# Patient Record
Sex: FEMALE | Race: WHITE | NOT HISPANIC OR LATINO | Employment: OTHER | URBAN - NONMETROPOLITAN AREA
[De-identification: names, ages, dates, MRNs, and addresses within clinical notes are randomized per-mention and may not be internally consistent; named-entity substitution may affect disease eponyms.]

---

## 2018-01-03 ENCOUNTER — OFFICE VISIT CONVERTED (OUTPATIENT)
Dept: FAMILY MEDICINE CLINIC | Age: 78
End: 2018-01-03
Attending: NURSE PRACTITIONER

## 2018-03-15 ENCOUNTER — OFFICE VISIT CONVERTED (OUTPATIENT)
Dept: FAMILY MEDICINE CLINIC | Age: 78
End: 2018-03-15
Attending: FAMILY MEDICINE

## 2018-06-04 ENCOUNTER — OFFICE VISIT CONVERTED (OUTPATIENT)
Dept: FAMILY MEDICINE CLINIC | Age: 78
End: 2018-06-04
Attending: NURSE PRACTITIONER

## 2018-06-22 ENCOUNTER — OFFICE VISIT CONVERTED (OUTPATIENT)
Dept: FAMILY MEDICINE CLINIC | Age: 78
End: 2018-06-22
Attending: FAMILY MEDICINE

## 2018-09-07 ENCOUNTER — OFFICE VISIT CONVERTED (OUTPATIENT)
Dept: FAMILY MEDICINE CLINIC | Age: 78
End: 2018-09-07
Attending: FAMILY MEDICINE

## 2018-11-05 ENCOUNTER — OFFICE VISIT CONVERTED (OUTPATIENT)
Dept: FAMILY MEDICINE CLINIC | Age: 78
End: 2018-11-05
Attending: NURSE PRACTITIONER

## 2018-12-10 ENCOUNTER — OFFICE VISIT CONVERTED (OUTPATIENT)
Dept: FAMILY MEDICINE CLINIC | Age: 78
End: 2018-12-10
Attending: FAMILY MEDICINE

## 2019-03-26 ENCOUNTER — HOSPITAL ENCOUNTER (OUTPATIENT)
Dept: OTHER | Facility: HOSPITAL | Age: 79
Discharge: HOME | End: 2019-03-26
Attending: FAMILY MEDICINE

## 2019-03-26 ENCOUNTER — OFFICE VISIT CONVERTED (OUTPATIENT)
Dept: FAMILY MEDICINE CLINIC | Age: 79
End: 2019-03-26
Attending: FAMILY MEDICINE

## 2019-03-26 LAB
ALBUMIN SERPL-MCNC: 3 G/DL (ref 3.5–5)
ALBUMIN/GLOB SERPL: 0.7 {RATIO} (ref 1.4–2.6)
ALP SERPL-CCNC: 98 U/L (ref 43–160)
ALT SERPL-CCNC: 14 U/L (ref 10–40)
ANION GAP SERPL CALC-SCNC: 17 MMOL/L (ref 8–19)
AST SERPL-CCNC: 18 U/L (ref 15–50)
BASOPHILS # BLD MANUAL: 0.03 10*3/UL (ref 0–0.2)
BASOPHILS NFR BLD MANUAL: 0.2 % (ref 0–3)
BILIRUB SERPL-MCNC: 0.41 MG/DL (ref 0.2–1.3)
BNP SERPL-MCNC: 1992 PG/ML (ref 0–1800)
BUN SERPL-MCNC: 13 MG/DL (ref 5–25)
BUN/CREAT SERPL: 14 {RATIO} (ref 6–20)
CALCIUM SERPL-MCNC: 9.4 MG/DL (ref 8.7–10.4)
CHLORIDE SERPL-SCNC: 94 MMOL/L (ref 99–111)
CHOLEST SERPL-MCNC: 147 MG/DL (ref 107–200)
CHOLEST/HDLC SERPL: 4.1 {RATIO} (ref 3–6)
CONV CO2: 29 MMOL/L (ref 22–32)
CONV TOTAL PROTEIN: 7.6 G/DL (ref 6.3–8.2)
CREAT UR-MCNC: 0.93 MG/DL (ref 0.5–0.9)
DEPRECATED RDW RBC AUTO: 46.8 FL
EOSINOPHIL # BLD MANUAL: 0.83 10*3/UL (ref 0–0.7)
EOSINOPHIL NFR BLD MANUAL: 5.8 % (ref 0–7)
ERYTHROCYTE [DISTWIDTH] IN BLOOD BY AUTOMATED COUNT: 14 % (ref 11.5–14.5)
GFR SERPLBLD BASED ON 1.73 SQ M-ARVRAT: 58 ML/MIN/{1.73_M2}
GLOBULIN UR ELPH-MCNC: 4.6 G/DL (ref 2–3.5)
GLUCOSE SERPL-MCNC: 104 MG/DL (ref 65–99)
GRANS (ABSOLUTE): 10.52 10*3/UL (ref 2–8)
GRANS: 73.6 % (ref 30–85)
HBA1C MFR BLD: 15.2 G/DL (ref 12–16)
HCT VFR BLD AUTO: 48 % (ref 37–47)
HDLC SERPL-MCNC: 36 MG/DL (ref 40–60)
IMM GRANULOCYTES # BLD: 0.12 10*3/UL (ref 0–0.54)
IMM GRANULOCYTES NFR BLD: 0.8 % (ref 0–0.43)
LDLC SERPL CALC-MCNC: 90 MG/DL (ref 70–100)
LYMPHOCYTES # BLD MANUAL: 1.85 10*3/UL (ref 1–5)
LYMPHOCYTES NFR BLD MANUAL: 6.7 % (ref 3–10)
MCH RBC QN AUTO: 29 PG (ref 27–31)
MCHC RBC AUTO-ENTMCNC: 31.7 G/DL (ref 33–37)
MCV RBC AUTO: 91.4 FL (ref 81–99)
MONOCYTES # BLD AUTO: 0.96 10*3/UL (ref 0.2–1.2)
OSMOLALITY SERPL CALC.SUM OF ELEC: 280 MOSM/KG (ref 273–304)
PLATELET # BLD AUTO: 388 10*3/UL (ref 130–400)
PMV BLD AUTO: 8.8 FL (ref 7.4–10.4)
POTASSIUM SERPL-SCNC: 4.9 MMOL/L (ref 3.5–5.3)
RBC # BLD AUTO: 5.25 10*6/UL (ref 4.2–5.4)
SODIUM SERPL-SCNC: 135 MMOL/L (ref 135–147)
TRIGL SERPL-MCNC: 106 MG/DL (ref 40–150)
VARIANT LYMPHS NFR BLD MANUAL: 12.9 % (ref 20–45)
VLDLC SERPL-MCNC: 21 MG/DL (ref 5–37)
WBC # BLD AUTO: 14.31 10*3/UL (ref 4.8–10.8)

## 2019-03-27 LAB — 25(OH)D3 SERPL-MCNC: 58 NG/ML (ref 30–100)

## 2019-04-12 ENCOUNTER — HOSPITAL ENCOUNTER (OUTPATIENT)
Dept: OTHER | Facility: HOSPITAL | Age: 79
Discharge: HOME | End: 2019-04-12
Attending: FAMILY MEDICINE

## 2019-04-30 ENCOUNTER — OFFICE VISIT CONVERTED (OUTPATIENT)
Dept: FAMILY MEDICINE CLINIC | Age: 79
End: 2019-04-30
Attending: FAMILY MEDICINE

## 2019-05-20 ENCOUNTER — OFFICE VISIT CONVERTED (OUTPATIENT)
Dept: FAMILY MEDICINE CLINIC | Age: 79
End: 2019-05-20
Attending: NURSE PRACTITIONER

## 2019-11-05 ENCOUNTER — OFFICE VISIT CONVERTED (OUTPATIENT)
Dept: FAMILY MEDICINE CLINIC | Age: 79
End: 2019-11-05
Attending: FAMILY MEDICINE

## 2019-11-05 ENCOUNTER — HOSPITAL ENCOUNTER (OUTPATIENT)
Dept: OTHER | Facility: HOSPITAL | Age: 79
Discharge: HOME | End: 2019-11-05
Attending: FAMILY MEDICINE

## 2019-11-05 LAB
ALBUMIN SERPL-MCNC: 3.8 G/DL (ref 3.5–5)
ALBUMIN/GLOB SERPL: 1.1 {RATIO} (ref 1.4–2.6)
ALP SERPL-CCNC: 87 U/L (ref 43–160)
ALT SERPL-CCNC: 8 U/L (ref 10–40)
ANION GAP SERPL CALC-SCNC: 23 MMOL/L (ref 8–19)
AST SERPL-CCNC: 17 U/L (ref 15–50)
BASOPHILS # BLD MANUAL: 0.05 10*3/UL (ref 0–0.2)
BASOPHILS NFR BLD MANUAL: 0.5 % (ref 0–3)
BILIRUB SERPL-MCNC: 0.34 MG/DL (ref 0.2–1.3)
BUN SERPL-MCNC: 13 MG/DL (ref 5–25)
BUN/CREAT SERPL: 14 {RATIO} (ref 6–20)
CALCIUM SERPL-MCNC: 9.7 MG/DL (ref 8.7–10.4)
CHLORIDE SERPL-SCNC: 96 MMOL/L (ref 99–111)
CHOLEST SERPL-MCNC: 180 MG/DL (ref 107–200)
CHOLEST/HDLC SERPL: 4.6 {RATIO} (ref 3–6)
CONV CO2: 24 MMOL/L (ref 22–32)
CONV TOTAL PROTEIN: 7.4 G/DL (ref 6.3–8.2)
CREAT UR-MCNC: 0.9 MG/DL (ref 0.5–0.9)
DEPRECATED RDW RBC AUTO: 45.2 FL
EOSINOPHIL # BLD MANUAL: 0.85 10*3/UL (ref 0–0.7)
EOSINOPHIL NFR BLD MANUAL: 8.2 % (ref 0–7)
ERYTHROCYTE [DISTWIDTH] IN BLOOD BY AUTOMATED COUNT: 13.1 % (ref 11.5–14.5)
GFR SERPLBLD BASED ON 1.73 SQ M-ARVRAT: 60 ML/MIN/{1.73_M2}
GLOBULIN UR ELPH-MCNC: 3.6 G/DL (ref 2–3.5)
GLUCOSE SERPL-MCNC: 81 MG/DL (ref 65–99)
GRANS (ABSOLUTE): 6.43 10*3/UL (ref 2–8)
GRANS: 61.6 % (ref 30–85)
HBA1C MFR BLD: 14.1 G/DL (ref 12–16)
HCT VFR BLD AUTO: 44.3 % (ref 37–47)
HDLC SERPL-MCNC: 39 MG/DL (ref 40–60)
IMM GRANULOCYTES # BLD: 0.02 10*3/UL (ref 0–0.54)
IMM GRANULOCYTES NFR BLD: 0.2 % (ref 0–0.43)
LDLC SERPL CALC-MCNC: 113 MG/DL (ref 70–100)
LYMPHOCYTES # BLD MANUAL: 2.24 10*3/UL (ref 1–5)
LYMPHOCYTES NFR BLD MANUAL: 8 % (ref 3–10)
MCH RBC QN AUTO: 29.6 PG (ref 27–31)
MCHC RBC AUTO-ENTMCNC: 31.8 G/DL (ref 33–37)
MCV RBC AUTO: 93.1 FL (ref 81–99)
MONOCYTES # BLD AUTO: 0.83 10*3/UL (ref 0.2–1.2)
OSMOLALITY SERPL CALC.SUM OF ELEC: 285 MOSM/KG (ref 273–304)
PLATELET # BLD AUTO: 427 10*3/UL (ref 130–400)
PMV BLD AUTO: 8.7 FL (ref 7.4–10.4)
POTASSIUM SERPL-SCNC: 4.6 MMOL/L (ref 3.5–5.3)
RBC # BLD AUTO: 4.76 10*6/UL (ref 4.2–5.4)
SODIUM SERPL-SCNC: 138 MMOL/L (ref 135–147)
TRIGL SERPL-MCNC: 141 MG/DL (ref 40–150)
TSH SERPL-ACNC: 1.05 M[IU]/L (ref 0.27–4.2)
VARIANT LYMPHS NFR BLD MANUAL: 21.5 % (ref 20–45)
VLDLC SERPL-MCNC: 28 MG/DL (ref 5–37)
WBC # BLD AUTO: 10.42 10*3/UL (ref 4.8–10.8)

## 2019-11-20 ENCOUNTER — HOSPITAL ENCOUNTER (OUTPATIENT)
Dept: OTHER | Facility: HOSPITAL | Age: 79
Discharge: HOME | End: 2019-11-20
Attending: FAMILY MEDICINE

## 2019-11-27 ENCOUNTER — HOSPITAL ENCOUNTER (OUTPATIENT)
Dept: OTHER | Facility: HOSPITAL | Age: 79
Discharge: HOME | End: 2019-11-27
Attending: NURSE PRACTITIONER

## 2019-11-27 ENCOUNTER — OFFICE VISIT CONVERTED (OUTPATIENT)
Dept: FAMILY MEDICINE CLINIC | Age: 79
End: 2019-11-27
Attending: NURSE PRACTITIONER

## 2020-05-05 ENCOUNTER — OFFICE VISIT CONVERTED (OUTPATIENT)
Dept: FAMILY MEDICINE CLINIC | Age: 80
End: 2020-05-05
Attending: FAMILY MEDICINE

## 2020-05-18 ENCOUNTER — OFFICE VISIT CONVERTED (OUTPATIENT)
Dept: FAMILY MEDICINE CLINIC | Age: 80
End: 2020-05-18
Attending: NURSE PRACTITIONER

## 2020-11-09 ENCOUNTER — HOSPITAL ENCOUNTER (OUTPATIENT)
Dept: OTHER | Facility: HOSPITAL | Age: 80
Discharge: HOME | End: 2020-11-09
Attending: FAMILY MEDICINE

## 2020-11-09 ENCOUNTER — OFFICE VISIT CONVERTED (OUTPATIENT)
Dept: FAMILY MEDICINE CLINIC | Age: 80
End: 2020-11-09
Attending: FAMILY MEDICINE

## 2020-11-09 LAB
25(OH)D3 SERPL-MCNC: 58.2 NG/ML (ref 30–100)
ALBUMIN SERPL-MCNC: 3.9 G/DL (ref 3.5–5)
ALBUMIN/GLOB SERPL: 1.1 {RATIO} (ref 1.4–2.6)
ALP SERPL-CCNC: 103 U/L (ref 43–160)
ALT SERPL-CCNC: 10 U/L (ref 10–40)
ANION GAP SERPL CALC-SCNC: 16 MMOL/L (ref 8–19)
AST SERPL-CCNC: 18 U/L (ref 15–50)
BILIRUB SERPL-MCNC: 0.29 MG/DL (ref 0.2–1.3)
BUN SERPL-MCNC: 14 MG/DL (ref 5–25)
BUN/CREAT SERPL: 12 {RATIO} (ref 6–20)
CALCIUM SERPL-MCNC: 9.7 MG/DL (ref 8.7–10.4)
CHLORIDE SERPL-SCNC: 100 MMOL/L (ref 99–111)
CONV CO2: 29 MMOL/L (ref 22–32)
CONV TOTAL PROTEIN: 7.5 G/DL (ref 6.3–8.2)
CREAT UR-MCNC: 1.19 MG/DL (ref 0.5–0.9)
ERYTHROCYTE [DISTWIDTH] IN BLOOD BY AUTOMATED COUNT: 13.6 % (ref 11.5–14.5)
GFR SERPLBLD BASED ON 1.73 SQ M-ARVRAT: 42 ML/MIN/{1.73_M2}
GLOBULIN UR ELPH-MCNC: 3.6 G/DL (ref 2–3.5)
GLUCOSE SERPL-MCNC: 143 MG/DL (ref 65–99)
HBA1C MFR BLD: 13.5 G/DL (ref 12–16)
HCT VFR BLD AUTO: 44 % (ref 37–47)
MCH RBC QN AUTO: 27.1 PG (ref 27–31)
MCHC RBC AUTO-ENTMCNC: 30.7 G/DL (ref 33–37)
MCV RBC AUTO: 88.4 FL (ref 81–99)
OSMOLALITY SERPL CALC.SUM OF ELEC: 295 MOSM/KG (ref 273–304)
PLATELET # BLD AUTO: 325 10*3/UL (ref 130–400)
PMV BLD AUTO: 8.3 FL (ref 7.4–10.4)
POTASSIUM SERPL-SCNC: 4.1 MMOL/L (ref 3.5–5.3)
RBC # BLD AUTO: 4.98 10*6/UL (ref 4.2–5.4)
SODIUM SERPL-SCNC: 141 MMOL/L (ref 135–147)
TSH SERPL-ACNC: 1.18 M[IU]/L (ref 0.27–4.2)
WBC # BLD AUTO: 9.5 10*3/UL (ref 4.8–10.8)

## 2020-11-11 LAB
EST. AVERAGE GLUCOSE BLD GHB EST-MCNC: 163 MG/DL
HBA1C MFR BLD: 7.3 % (ref 3.5–5.7)

## 2020-12-18 ENCOUNTER — HOSPITAL ENCOUNTER (OUTPATIENT)
Dept: DIABETES SERVICES | Facility: HOSPITAL | Age: 80
Setting detail: RECURRING SERIES
Discharge: STILL ADMITTED OR ADMINISTRATIVE | End: 2020-12-31
Attending: FAMILY MEDICINE

## 2021-02-12 ENCOUNTER — HOSPITAL ENCOUNTER (OUTPATIENT)
Dept: OTHER | Facility: HOSPITAL | Age: 81
Discharge: HOME | End: 2021-02-12
Attending: FAMILY MEDICINE

## 2021-02-12 ENCOUNTER — OFFICE VISIT CONVERTED (OUTPATIENT)
Dept: FAMILY MEDICINE CLINIC | Age: 81
End: 2021-02-12
Attending: FAMILY MEDICINE

## 2021-02-12 LAB
ALBUMIN SERPL-MCNC: 3.9 G/DL (ref 3.5–5)
ALBUMIN/GLOB SERPL: 1.1 {RATIO} (ref 1.4–2.6)
ALP SERPL-CCNC: 104 U/L (ref 43–160)
ALT SERPL-CCNC: 14 U/L (ref 10–40)
ANION GAP SERPL CALC-SCNC: 17 MMOL/L (ref 8–19)
AST SERPL-CCNC: 19 U/L (ref 15–50)
BILIRUB SERPL-MCNC: 0.25 MG/DL (ref 0.2–1.3)
BUN SERPL-MCNC: 23 MG/DL (ref 5–25)
BUN/CREAT SERPL: 25 {RATIO} (ref 6–20)
CALCIUM SERPL-MCNC: 9.3 MG/DL (ref 8.7–10.4)
CHLORIDE SERPL-SCNC: 99 MMOL/L (ref 99–111)
CHOLEST SERPL-MCNC: 175 MG/DL (ref 107–200)
CHOLEST/HDLC SERPL: 3.6 {RATIO} (ref 3–6)
CONV CO2: 25 MMOL/L (ref 22–32)
CONV CREATININE URINE, RANDOM: 41.4 MG/DL (ref 10–300)
CONV MICROALBUM.,U,RANDOM: <12 MG/L (ref 0–20)
CONV TOTAL PROTEIN: 7.6 G/DL (ref 6.3–8.2)
CREAT UR-MCNC: 0.93 MG/DL (ref 0.5–0.9)
EST. AVERAGE GLUCOSE BLD GHB EST-MCNC: 128 MG/DL
GFR SERPLBLD BASED ON 1.73 SQ M-ARVRAT: 57 ML/MIN/{1.73_M2}
GLOBULIN UR ELPH-MCNC: 3.7 G/DL (ref 2–3.5)
GLUCOSE SERPL-MCNC: 122 MG/DL (ref 65–99)
HBA1C MFR BLD: 6.1 % (ref 3.5–5.7)
HDLC SERPL-MCNC: 49 MG/DL (ref 40–60)
LDLC SERPL CALC-MCNC: 94 MG/DL (ref 70–100)
MICROALBUMIN/CREAT UR: 29 MG/G{CRE} (ref 0–35)
OSMOLALITY SERPL CALC.SUM OF ELEC: 287 MOSM/KG (ref 273–304)
POTASSIUM SERPL-SCNC: 4.7 MMOL/L (ref 3.5–5.3)
SODIUM SERPL-SCNC: 136 MMOL/L (ref 135–147)
TRIGL SERPL-MCNC: 161 MG/DL (ref 40–150)
VLDLC SERPL-MCNC: 32 MG/DL (ref 5–37)

## 2021-05-04 ENCOUNTER — OFFICE VISIT CONVERTED (OUTPATIENT)
Dept: FAMILY MEDICINE CLINIC | Age: 81
End: 2021-05-04
Attending: FAMILY MEDICINE

## 2021-05-04 ENCOUNTER — HOSPITAL ENCOUNTER (OUTPATIENT)
Dept: OTHER | Facility: HOSPITAL | Age: 81
Discharge: HOME | End: 2021-05-04
Attending: FAMILY MEDICINE

## 2021-05-04 LAB
ALBUMIN SERPL-MCNC: 3.9 G/DL (ref 3.5–5)
ALBUMIN/GLOB SERPL: 1.1 {RATIO} (ref 1.4–2.6)
ALP SERPL-CCNC: 84 U/L (ref 43–160)
ALT SERPL-CCNC: 11 U/L (ref 10–40)
ANION GAP SERPL CALC-SCNC: 16 MMOL/L (ref 8–19)
AST SERPL-CCNC: 14 U/L (ref 15–50)
BILIRUB SERPL-MCNC: 0.33 MG/DL (ref 0.2–1.3)
BUN SERPL-MCNC: 24 MG/DL (ref 5–25)
BUN/CREAT SERPL: 22 {RATIO} (ref 6–20)
CALCIUM SERPL-MCNC: 8.7 MG/DL (ref 8.7–10.4)
CHLORIDE SERPL-SCNC: 96 MMOL/L (ref 99–111)
CHOLEST SERPL-MCNC: 183 MG/DL (ref 107–200)
CHOLEST/HDLC SERPL: 3.3 {RATIO} (ref 3–6)
CONV CO2: 26 MMOL/L (ref 22–32)
CONV TOTAL PROTEIN: 7.6 G/DL (ref 6.3–8.2)
CREAT UR-MCNC: 1.08 MG/DL (ref 0.5–0.9)
EST. AVERAGE GLUCOSE BLD GHB EST-MCNC: 131 MG/DL
GFR SERPLBLD BASED ON 1.73 SQ M-ARVRAT: 47 ML/MIN/{1.73_M2}
GLOBULIN UR ELPH-MCNC: 3.7 G/DL (ref 2–3.5)
GLUCOSE SERPL-MCNC: 126 MG/DL (ref 65–99)
HBA1C MFR BLD: 6.2 % (ref 3.5–5.7)
HDLC SERPL-MCNC: 56 MG/DL (ref 40–60)
LDLC SERPL CALC-MCNC: 97 MG/DL (ref 70–100)
OSMOLALITY SERPL CALC.SUM OF ELEC: 282 MOSM/KG (ref 273–304)
POTASSIUM SERPL-SCNC: 5 MMOL/L (ref 3.5–5.3)
SODIUM SERPL-SCNC: 133 MMOL/L (ref 135–147)
TRIGL SERPL-MCNC: 152 MG/DL (ref 40–150)
VLDLC SERPL-MCNC: 30 MG/DL (ref 5–37)

## 2021-05-18 NOTE — PROGRESS NOTES
Radha Arana  1940     Office/Outpatient Visit    Visit Date: Fri, Feb 12, 2021 10:57 am    Provider: Rell Harmon MD (Assistant: Staci Spear MA)    Location: [facility]        Electronically signed by Rell Harmon MD on  02/12/2021 12:05:06 PM                             Subjective:        CC: Shayy is a 81 year old White female.  This is a follow-up visit.  The patient is accompanied into the exam room by her daughter.          HPI:       Ms. Arana is here with her daughter, Honey.  They say that she has been doing quite well lately.  Has been up and about a little bit inside the house and doing a little bit of cleaning etc.  No longer taking the Norco.  She does continue on the gabapentin and finds benefit from that.Her initial pulse taken by the nurse was a little elevated but with rest came down nicely.  Patient is not aware of any palpitation issues.  She is anticoagulated.  Has known paroxysmal atrial fibrillation.Does have a history of combined systolic and diastolic congestive heart failure.  We are reminded that about this time last year she was pretty sick with difficulty breathing.      At her last office visit we did draw some blood and showed that her blood sugars had been at a level to qualify her as diabetes.  Started her on Metformin which she says she is tolerating well.  She has a log of blood pressure readings with the maximum being in the 180s but mostly her blood sugars are running in the 1 20-1 40 range.She is doing a really good job with her blood sugar log includes even record of her diet.  She did meet with the dietitian and has a follow-up appointment sometime in March.She has lost about 10 pounds so I commended her for that.  Continues to use her positive airway pressure device at nighttime for sleep apnea and that works well for her    ROS:     CONSTITUTIONAL:  Negative for chills, fatigue, fever, and weight change.      EYES:  Positive for  blurred vision.      CARDIOVASCULAR:  Negative for chest pain, orthopnea, paroxysmal nocturnal dyspnea and pedal edema.      RESPIRATORY:  Negative for dyspnea.      GASTROINTESTINAL:  Negative for abdominal pain, constipation, diarrhea, nausea and vomiting.      GENITOURINARY:  Negative for dysuria and frequent urination.      NEUROLOGICAL:  Negative for dizziness, headaches, paresthesias, and weakness.      PSYCHIATRIC:  Negative for anxiety, depression, and sleep disturbances.          Past Medical History / Family History / Social History:         Last Reviewed on 2021 12:04 PM by Rell Harmon    Past Medical History:                 PAST MEDICAL HISTORY         Hyperlipidemia    Hypertension     Asthma: mild severity; as child, still sometimes needs albuterol;     Sleep Apnea: uses CPAP;     Gastroesophageal Reflux Disease     Renal Stones     Chronic Pain: affecting the low back;     Hypothyroidism    goiter     Epidural Steroid injection x 2 ,     bilateral sacrioiliac injection 10/2010     Depression     Obesity: morbid;     optic neuritis with depth perception dysfunction         GYNECOLOGICAL HISTORY:             CURRENT MEDICAL PROVIDERS:    Cardiologist: Dr. Sheppard    E/N/T: Shy (2016 is only visit)    Ophthalmologist: Dr. Fink    Orthopedist: Abigail    Pulmonologist: Candy (not been seen since )    Urologist: Dimitrios             ADVANCE DIRECTIVES: Living will at home         PREVENTIVE HEALTH MAINTENANCE             BONE DENSITY: was last done 2017 with normal results     COLORECTAL CANCER SCREENING: declines colorectal cancer screening, understands reason for testing; defers/declines     DENTAL CLEANING: was last done      EYE EXAM: was last done  The next one is due  Has on next month as well, goes every 6 months.      MAMMOGRAM: was last done 9/24/10 with normal results         PAST MEDICAL HISTORY         Positive for    Afib;     Hospitalizations:     Pneumonia last admit date 16         PAST MEDICAL HISTORY             ADVANCE DIRECTIVE Living will at home         Surgical History:         Cholecystectomy: at age 63;     Hysterectomy: at age 38's;  at age as child     Partial Thyroidectomy;    Carpal Tunnel ;     Positive for    bladder sling;;         Family History:     Father:  at age 68; Cause of death was MI     Mother: Cause of death was altzhiemers at 90     Brother(s): 3 brother(s) total; 1, at birth      Sister(s): 3 sister(s) total;  pacemaker;  Allergies ( one sister )         Social History: Honey Hernandez is her daughter     Occupation: Retired (Prior occupation: [employer], )     Marital Status:          Tobacco/Alcohol/Supplements:     Last Reviewed on 2021 11:02 AM by Staci Spear    Tobacco: She has never smoked.          Alcohol:  Does not drink alcohol and never has.          Substance Abuse History:     Last Reviewed on 2017 08:52 AM by Rell Hramon        Mental Health History:     Last Reviewed on 2017 08:52 AM by Rell Haromn        Communicable Diseases (eg STDs):     Last Reviewed on 2017 08:52 AM by Rell Harmon        Allergies:     Last Reviewed on 2021 11:02 AM by Staci Spear    Morphine:          Current Medications:     Last Reviewed on 2021 11:02 AM by Staci Spear    Ventolin HFA 90 mcg/actuation Inhalation HFA Aerosol Inhaler [inhale 2 puffs (180 mcg) by inhalation route every 4-6 hours as needed]    Multivitamin/Mineral Supplement     omeprazole 20 mg oral capsule,delayed release (enteric coated) [TAKE 1 CAPSULE BY MOUTH EVERY DAY]    levothyroxine 25 mcg oral tablet [TAKE 1 TABLET(S) BY MOUTH DAILY]    gabapentin 300 mg oral capsule [TAKE 1 CAPSULE BY MOUTH TWICE DAILY]    oxybutynin chloride 15 mg oral Tablet, Extended Release 24 hr [TAKE ONE TABLET BY MOUTH EVERY NIGHT AT BEDTIME]    Eliquis 5 mg oral tablet [Take 1 tablet(s) by  mouth bid]    metoprolol succinate 50 mg oral Tablet, Extended Release 24 hr [Take 1 tablet(s) by mouth daily]    Colace 100 mg oral capsule [1 QOD]    DULoxetine 60 mg oral capsule,delayed release (enteric coated) [TAKE 1 CAPSULE(S) BY MOUTH TWICE DAILY]    Probiotic one tablet po bid      furosemide 40 mg oral tablet [TAKE ONE TABLET EVERY DAY]    potassium chloride 20 mEq oral Tablet, Extended Release Particles/Crystals [TAKE ONE TABLET TWICE DAILY]    ipratropium-albuterol 0.5 mg-3 mg(2.5 mg base)/3 mL Inhalation Solution for Nebulization [Use 1 vial(s) by nebulizer qid]    Melatonin 5 mg oral tablet [take two tablets qhs]    metFORMIN 500 mg oral Tablet, Extended Release Gastric Retention 24 hr [take 1 tablet (500 mg) by oral route once daily with the evening meal]        Objective:        Vitals:         Current: 2/12/2021 11:03:32 AM    Ht:  5 ft, 1.5 in;  Wt: 197.3 lbs;  BMI: 36.7T: 96.7 F (temporal);  BP: 119/47 mm Hg (left arm, sitting);  P: 117 bpm (left arm (BP Cuff), sitting);  sCr: 1.19 mg/dL;  GFR: 39.32O2 Sat: 96 % (2 liters O2)        Repeat:     11:6:30 AM  P:   87bpm (finger clip, sitting)     Exams:     PHYSICAL EXAM:     GENERAL: morbidly obese;  well groomed;  no apparent distress;     EYES: nonicteric;     E/N/T:  normal EACs, TMs, nasal/oral mucosa, teeth, gingiva, and oropharynx;     NECK: range of motion is normal; thyroid exam reveals scar from surgery;  carotid exam is normal with good upstroke and no bruits;     RESPIRATORY: normal appearance and symmetric expansion of chest wall; normal respiratory rate and pattern with no distress; Diminished airflow with a few crackles in the bases;     CARDIOVASCULAR: rate is 80's bpm;;  rhythm is regular;  no systolic murmur;     GASTROINTESTINAL: nontender; normal bowel sounds; no organomegaly; no masses;     GENITOURINARY: no CVAT;     LYMPHATIC: no enlargement of cervical or facial nodes; no supraclavicular nodes;     MUSCULOSKELETAL: globular  knees, 1-2 + pedal edema     NEUROLOGIC: no lateralizing deficits.;     PSYCHIATRIC: Mood is kind of upbeat today.  She is proud of herself for getting off of the pain medication..  No delusions or hallucinations.;     Right foot exam    Protective sensation using Monofilament test: NORMAL sensation. Patient detects .07 grams of force which is considered normal.    Vascular status: normal peripheral vascular exam with palpable dorsal pedal and posterior tibal pulses and brisk digital capillary refill    Skin is intact without sores or ulcers         Assessment:         M15.0   Primary generalized (osteo)arthritis       M54.5   Low back pain       E89.0   Postprocedural hypothyroidism       E66.01   Morbid (severe) obesity due to excess calories       G47.33   Obstructive sleep apnea (adult) (pediatric)       I48.0   Paroxysmal atrial fibrillation       E11.9   Type 2 diabetes mellitus without complications       I50.42   Chronic combined systolic (congestive) and diastolic (congestive) heart failure           ORDERS:         Meds Prescribed:       [New Rx] lisinopriL 10 mg oral tablet [take 1 tablet (10 mg) by oral route once daily], #90 (ninety) tablets, Refills: 0 (zero)         Lab Orders:       [id number]  DIAB2 - [facility] CMP A1C LIPID AND MICRO ALBUM CR RATIO: [id number]  (Send-Out)              Other Orders:       2028F  Foot examination performed (includes examination through visual inspection, sensory exam with monofilament, and pulse exam - report when any of the three components are completed) (DM)4  (In-House)                      Plan: Discussed covid vaccine too        Primary generalized (osteo)arthritisShjanak is doing quite well ambulating with her Rollator functionally improved glad she was able to get off of the Norco.        Low back painContinue on her current regimen and commended her for the weight loss and improved activity level        Postprocedural hypothyroidismContinue on current  regimen        Morbid (severe) obesity due to excess caloriesContinue on her current regimen and commended her for the weight loss and improved activity level        Paroxysmal atrial fibrillationCurrently asymptomatic.  Is anticoagulated.  Regular medication.  Continue on current regimen.        Type 2 diabetes mellitus without complicationsShe seems motivated in regards to her diabetes and blood sugar control.  Since her blood pressure is little borderline elevated today and she has now been diagnosed with diabetes I am also going to add an ACE inhibitor.    LABORATORY:  Labs ordered to be performed today include Diabetes Panel 2;CMP, A1C, Lipid, Microalbumin:Creatinine Ratio.            Prescriptions:       [New Rx] lisinopriL 10 mg oral tablet [take 1 tablet (10 mg) by oral route once daily], #90 (ninety) tablets, Refills: 0 (zero)           Orders:       [id number]  DIAB - [facility] CMP A1C LIPID AND MICRO ALBUM CR RATIO: [id number]  (Send-Out)              Chronic combined systolic (congestive) and diastolic (congestive) heart failureACE inhibitor should be beneficial for her CHF as well hopefully.            Other Orders      2028F  Foot examination performed (includes examination through visual inspection, sensory exam with monofilament, and pulse exam - report when any of the three components are completed) (DM)4  (In-House)              Other Patient Education Handouts:     Dragon transcription disclaimer:        Much of this encounter note is an electronic transcription/translation of spoken language to printed text.  The electronic translation of spoken language may permit erroneous, or at times, nonsensical words or phrases to be inadvertently transcribed.  Although I have reviewed the note for such errors, some may still exist.        Charge Capture:         Primary Diagnosis:     M15.0  Primary generalized (osteo)arthritis           Orders:      72233  Office/outpatient visit; established  patient, level 4  (In-House)              M54.5  Low back pain     E89.0  Postprocedural hypothyroidism     E66.01  Morbid (severe) obesity due to excess calories     G47.33  Obstructive sleep apnea (adult) (pediatric)     I48.0  Paroxysmal atrial fibrillation     E11.9  Type 2 diabetes mellitus without complications     I50.42  Chronic combined systolic (congestive) and diastolic (congestive) heart failure         Other Orders:       2028F  Foot examination performed (includes examination through visual inspection, sensory exam with monofilament, and pulse exam - report when any of the three components are completed) (DM)4  (In-House)

## 2021-05-18 NOTE — PROGRESS NOTES
Radha Arana 1940     Office/Outpatient Visit    Visit Date: Mon, Nov 5, 2018 11:01 am    Provider: Susan Le N.P. (Assistant: Ekta Donnelly MA)    Location: [facility]        Electronically signed by Susan Le N.P. on  11/05/2018 11:33:22 AM                             SUBJECTIVE:        CC:     Shayy is a 78 year old White female.  presents today due to complaints of drainage, cough, wheezing X 3-4 days         HPI:         Shayy presents with upper respiratory illness.  These have been present for the past 4 days.  The symptoms include cough, nasal congestion, nasal discharge, a moderate amount of, purulent sputum production and wheezing.  She denies body aches, ear complaints or fever.  She denies exposure to ill contacts.  Medical history is significant for moderate perennial allergies.  Was previously taking antihistamines daily but has not for some time, but would like to have those refilled (allegra/zyrtec/claritin) in the past  feels that this is related to her allergies has been using the inhaler every 4 hours over the past few days     was put on Oxybutinin for incontinence - was wondering if would cause hallucinations- reports that for the past 2 nights, has woke up talking to no one, seeing things - it has been both nights that she has taken it     ROS:     CONSTITUTIONAL:  Negative for chills, fatigue, fever, and weight change.      EYES:  Negative for blurred vision.      E/N/T:  Positive for nasal congestion.   Negative for ear pain.      CARDIOVASCULAR:  Negative for chest pain, orthopnea, paroxysmal nocturnal dyspnea and pedal edema.      RESPIRATORY:  Positive for recent cough, pleuritic chest pain and frequent wheezing.   Negative for dyspnea.      GASTROINTESTINAL:  Negative for abdominal pain, constipation, diarrhea, nausea and vomiting.      NEUROLOGICAL:  Negative for dizziness, headaches, paresthesias, and weakness.      PSYCHIATRIC:  Negative for  anxiety, depression, and sleep disturbances.          PMH/FMH/SH:     Last Reviewed on 2018 09:33 AM by Rell Harmon    Past Medical History:                 PAST MEDICAL HISTORY         Hyperlipidemia    Hypertension     Asthma: mild severity; as child, still sometimes needs albuterol;     Sleep Apnea: uses CPAP;     Gastroesophageal Reflux Disease     Renal Stones     Chronic Pain: affecting the low back;     Hypothyroidism    goiter     Epidural Steroid injection x 2 ,     bilateral sacrioiliac injection 10/2010     Depression     Obesity: morbid;     optic neuritis with depth perception dysfunction         GYNECOLOGICAL HISTORY:             CURRENT MEDICAL PROVIDERS:    Cardiologist: Dr. Sheppard    E/N/T: Shy (2016 is only visit)    Ophthalmologist: Dr. Fink    Orthopedist: Abigail    Pulmonologist: Candy (not been seen since )    Urologist: Dimitrios             ADVANCE DIRECTIVES: Living will at home         PREVENTIVE HEALTH MAINTENANCE             BONE DENSITY: was last done 2017 with normal results     COLORECTAL CANCER SCREENING: declines colorectal cancer screening, understands reason for testing; defers/declines     EYE EXAM: was last done 18 The next one is due  Has on next month as well, goes every 6 months.      MAMMOGRAM: was last done 9/24/10 with normal results         PAST MEDICAL HISTORY         Positive for    Afib;     Hospitalizations:    Pneumonia last admit date 16         PAST MEDICAL HISTORY             ADVANCE DIRECTIVE Living will at home         Surgical History:         Cholecystectomy: at age 63;     Hysterectomy: at age 38's;  at age as child      Partial Thyroidectomy;    Carpal Tunnel ;     Positive for    bladder sling;;         Family History:     Father:  at age 68; Cause of death was MI     Mother: Cause of death was altzhiemers at 90     Brother(s): 3 brother(s) total; 1, at birth      Sister(s): 3 sister(s) total;   pacemaker;  Allergies ( one sister )         Social History: Honey Hernandez is her daughter     Occupation: Retired (Prior occupation: [employer], )     Marital Status:          Tobacco/Alcohol/Supplements:     Last Reviewed on 11/05/2018 11:04 AM by Ekta Donnelly    Tobacco: She has never smoked.          Alcohol:  Does not drink alcohol and never has.          Substance Abuse History:     Last Reviewed on 9/06/2017 08:52 AM by Rell Harmon        Mental Health History:     Last Reviewed on 9/06/2017 08:52 AM by Rell Harmon        Communicable Diseases (eg STDs):     Last Reviewed on 9/06/2017 08:52 AM by Rell Harmon            Current Problems:     Last Reviewed on 11/05/2018 11:30 AM by Susan Le    Use of high risk medications     Thoracolumbar spinal stenosis, Unspecified     Neoplasm of uncertain behavior: parotid gland     Nasal lesion     Osteoarthritis of knee     Paroxysmal atrial fibrillation     Tachycardia, unspecified     Hoarseness     Sleep apnea     Leukocytosis, unspecified     Cystocele, midline     Other high-risk medications     Morbid obesity     Degeneration of lumbar disc     Hypothyroidism, postsurgical     Essential hypertension     Low back pain     Hypercholesterolemia     Hypercalcemia     Patient visit for long term (current) use of other drugs     Elevated fasting glucose     GERD     Prediabetes     Generalized osteoarthritis, site unspecified     Abnormal mammogram, unspecified     Urinary incontinence     Upper respiratory illness     Screening for depression         Immunizations:     Prevnar 13 (Pneumococcal PCV 13) 3/26/2015     Fluvirin (4 + years dose) 9/22/2014     Fluzone (3 + years dose) 9/27/2011     Fluzone (3 + years dose) 11/13/2012     Fluzone High-Dose pf (>=65 yr) 10/16/2013     Fluzone High-Dose pf (>=65 yr) 9/21/2015     Fluzone High-Dose pf (>=65 yr) 9/21/2016     Fluzone High-Dose pf (>=65 yr) 10/2/2017      Fluzone High-Dose pf (>=65 yr) 10/1/2018     Pneumococcal, 23-valent IM/SC (adult and pt >=2yr) 2/23/2011     Boostrix (Tdap) 4/17/2015     Zostavax (Zoster live) 4/17/2015         Allergies:     Last Reviewed on 11/05/2018 11:04 AM by Ekta Donnelly    Morphine:        Current Medications:     Last Reviewed on 11/05/2018 11:18 AM by Susan Le    Duloxetine HCl 60mg Capsules, Delayed Release Take 1 capsule(s) by mouth bid     Neurontin 300mg Capsules Take 1 capsule(s) by mouth bid     Omeprazole 20mg Capsules, Extended Release one a day     Synthroid 0.025mg Tablet Take 1 tablet(s) by mouth daily     Eliquis 5mg Tablet Take 1 tablet(s) by mouth bid     Metoprolol Succinate 50mg Tablets, Extended Release Take 1 tablet(s) by mouth daily     magnesium 250mg  1tab daily     Vitamin B12 1,000mcg Tablet 1 bid     Tolterodine 4mg Capsules, Extended Release 1 capsule daily     Multivitamin/Mineral Supplement     Albuterol 90mcg/1actuation Oral Inhaler 1 puff  PRN     Hydrocodone/Acetaminophen 10mg/325mg Tablet Take 1 tablet(s) by mouth tid     Meloxicam 15mg Tablet 1 tab daily     Calcium Carbonate 500mg Chewable Tablet Chew 1 tablet(s) by mouth bid     Glucosamine/Chondroitin 750mg/600mg Tablet Take 2 tablet(s) by mouth daily     Ocuvite eye vitamin daily     Vitamin D3 2,000IU Capsules 2 capsules daily     Vitamin C 100mg Tablet 1 tab bid     Oxybutynin Chloride 15mg Tablets, Extended Release Take 1 tablet(s) by mouth daily         OBJECTIVE:        Vitals:         Current: 11/5/2018 11:07:29 AM    Ht:  5 ft, 1.5 in;  Wt: 237 lbs;  BMI: 44.1    T: 98.5 F (oral);  BP: 146/53 mm Hg (left arm, sitting);  P: 100 bpm (left arm (BP Cuff), sitting);  sCr: 0.86 mg/dL;  GFR: 61.78        Exams:     PHYSICAL EXAM:     GENERAL: vital signs recorded - well developed, well nourished;  no apparent distress;     E/N/T: EARS: external auditory canal edematous and erythematous;  OROPHARYNX: posterior pharynx shows cobblestone  appearance and sinus drainage noted in posterior pharynx;     NECK: range of motion is normal; thyroid is non-palpable;     RESPIRATORY: normal respiratory rate and pattern with no distress; normal breath sounds with no rales, rhonchi, wheezes or rubs;     CARDIOVASCULAR: normal rate; rhythm is regular;  no systolic murmur; no edema;     LYMPHATIC: no enlargement of cervical or facial nodes; no supraclavicular nodes;     MUSCULOSKELETAL: gait: uses a walker;     NEUROLOGICAL:  cranial nerves, motor and sensory function, reflexes, gait and coordination are all intact;     PSYCHIATRIC:  appropriate affect and demeanor; normal speech pattern; grossly normal memory;         ASSESSMENT:           465.8   J30.9  Upper respiratory illness              DDx:     788.30   R32  Urinary incontinence              DDx:         ORDERS:         Meds Prescribed:       Cetirizine HCl 10mg Tablet 1 tab daily  #30 (Thirty) tablet(s) Refills: 3                 PLAN:          Upper respiratory illness I feel that if we get the drainage under control, this may help with her symtpoms I have instructed her to call in the next 3-5 days if no improvment  - resume fluticasone           Prescriptions:       Cetirizine HCl 10mg Tablet 1 tab daily  #30 (Thirty) tablet(s) Refills: 3           Patient Education Handouts:       Allergies           Urinary incontinence recommended that she stop taking it for the next few nights to see if her symptoms go away, if so, then she needs to discuss with MD that prescribed - otherwise, will follow up regarding the hallucinations             CHARGE CAPTURE:           Primary Diagnosis:     465.8 Upper respiratory illness            J30.9    Allergic rhinitis, unspecified              Orders:          74548   Office/outpatient visit; established patient, level 3  (In-House)           788.30 Urinary incontinence            R32    Unspecified urinary incontinence

## 2021-05-18 NOTE — PROGRESS NOTES
Radha Arana 1940     Office/Outpatient Visit    Visit Date: Fri, Sep 7, 2018 08:45 am    Provider: Rell Harmon MD (Assistant: Sarah Spurling, MA)    Location: [facility]        Electronically signed by Rell Harmon MD on  09/18/2018 11:52:46 AM                             SUBJECTIVE:        CC:     Shayy is a 78 year old White female.  The patient is accompanied into the exam room by her whose name is Self.  Medicare Wellness;         HPI:         Shayy is here for a Medicare wellness visit.  Individual and family medical history was reviewed and updated A list of current providers and suppliers reviewed and updated A current height, weight, BMI, blood pressure, and pulse were recorded in the vitals section of the note and have been reviewed A review of possible cognitive impairment was performed and the following was noted: she is not having trouble driving;  memory changes are noted;  she is not having trouble with her finances A review of functional ability and level of saftely was performed and the following was noted: Bathing ( performs independently ); Dressing: ( performs independently ); Eating: ( performs independently ); Toilet use: ( performs independently ); Transferring: ( performs independently ); Urine and Bowel continence: ( Abnormal ) In regard to hearing, she reports having trouble hearing the TV/radio when others do not and having to strain to hear or understand conversations, but not wearing hearing aid(s).  Concerning home safety, she reports that at home she DOES have poor lighting, a slippery bath or shower, grab bars in the bath, handrails on stairs and functioning smoke alarms, but not throw rugs.   She has grab bars in the bath, handrails on stairs and functioning smoke alarms.      Immunization Status: Up to date; Wants to get high dose flu shot today;     Physical Activity: ** Never exercises; Has not had any falls or only one fall without injury in the  past year.  Advance Care Directive updated today in PM Tobacco: She has never smoked.    Preventative Health updated today         PHQ-9 Depression Screening: Completed form scanned and in chart; Total Score 2 Alcohol Consumption Screening: Completed form scanned and in chart; Total Score 0     She has PAF and follows with cardiology.  No palpitations. Is on NOAC         Hypothyroidism, postsurgical details; she denies any related symptoms.  She reports no symptoms suggestive of adverse medication effect.      She has generalized OA which especially affects her knees and back.  She uses pain meds and while not total relief does feel like she is more functional on the med. Her obesity doesn't help any, and she is aware that she would improve with weight loss         Concerning essential hypertension, she did not bring her blood pressure diary, but says that pressures have been okay.  She is tolerating the medication well without side effects.  Compliance with treatment has been good.      ROS:     CONSTITUTIONAL:  Negative for chills, fatigue, fever, and weight change.      EYES:  Negative for blurred vision.      CARDIOVASCULAR:  Negative for chest pain, orthopnea, paroxysmal nocturnal dyspnea and pedal edema.      RESPIRATORY:  Negative for dyspnea.      GASTROINTESTINAL:  Negative for abdominal pain, constipation, diarrhea, nausea and vomiting.      GENITOURINARY:  Negative for dysuria and frequent urination.      NEUROLOGICAL:  Negative for dizziness, headaches, paresthesias, and weakness.      PSYCHIATRIC:  Negative for anxiety, depression, and sleep disturbances.          PMH/FMH/SH:     Last Reviewed on 9/07/2018 09:33 AM by Rell Harmon    Past Medical History:                 PAST MEDICAL HISTORY         Hyperlipidemia    Hypertension     Asthma: mild severity; as child, still sometimes needs albuterol;     Sleep Apnea: uses CPAP;     Gastroesophageal Reflux Disease     Renal Stones     Chronic  Pain: affecting the low back;     Hypothyroidism    goiter     Epidural Steroid injection x 2 ,     bilateral sacrioiliac injection 10/2010     Depression     Obesity: morbid;     optic neuritis with depth perception dysfunction         GYNECOLOGICAL HISTORY:             CURRENT MEDICAL PROVIDERS:    Cardiologist: Dr. Sheppard    E/N/T: Shy (2016 is only visit)    Ophthalmologist: Dr. Fink    Orthopedist: Abigail    Pulmonologist: Candy (not been seen since )             ADVANCE DIRECTIVES: Living will at home         PREVENTIVE HEALTH MAINTENANCE             BONE DENSITY: was last done 2017 with normal results     COLORECTAL CANCER SCREENING: declines colorectal cancer screening, understands reason for testing; defers/declines     EYE EXAM: was last done 18 The next one is due  Has on next month as well, goes every 6 months.      MAMMOGRAM: was last done 9/24/10 with normal results         PAST MEDICAL HISTORY         Positive for    Afib;     Hospitalizations:    Pneumonia last admit date 16         PAST MEDICAL HISTORY             ADVANCE DIRECTIVE Living will at home         Surgical History:         Cholecystectomy: at age 63;     Hysterectomy: at age 38's;  at age as child      Partial Thyroidectomy;    Carpal Tunnel ;     Positive for    bladder sling;;         Family History:     Father:  at age 68; Cause of death was MI     Mother: Cause of death was altzhiemers at 90     Brother(s): 3 brother(s) total; 1, at birth      Sister(s): 3 sister(s) total;  pacemaker;  Allergies ( one sister )         Social History: Honey Hernandez is her daughter     Occupation: Retired (Prior occupation: [employer], )     Marital Status:          Tobacco/Alcohol/Supplements:     Last Reviewed on 2018 08:45 AM by Spurling, Sarah C    Tobacco: She has never smoked.          Alcohol:  Does not drink alcohol and never has.          Substance Abuse History:      Last Reviewed on 9/06/2017 08:52 AM by Rell Harmon        Mental Health History:     Last Reviewed on 9/06/2017 08:52 AM by Rell Harmon        Communicable Diseases (eg STDs):     Last Reviewed on 9/06/2017 08:52 AM by Rell Harmon            Allergies:     Last Reviewed on 9/07/2018 08:45 AM by Spurling, Sarah C    Morphine:        Current Medications:     Last Reviewed on 9/07/2018 08:54 AM by Spurling, Sarah C    Neurontin 300mg Capsules Take 1 capsule(s) by mouth bid     Omeprazole 20mg Capsules, Extended Release one a day     Synthroid 0.025mg Tablet Take 1 tablet(s) by mouth daily     Duloxetine HCl 60mg Capsules, Delayed Release Take 1 capsule(s) by mouth bid     Eliquis 5mg Tablet Take 1 tablet(s) by mouth bid     Metoprolol Succinate 50mg Tablets, Extended Release Take 1 tablet(s) by mouth daily     magnesium 250mg  1tab daily     Vitamin B12 1,000mcg Tablet 1 bid     Tolterodine 4mg Capsules, Extended Release 1 capsule daily     Multivitamin/Mineral Supplement     Albuterol 90mcg/1actuation Oral Inhaler 1 puff  PRN     Hydrocodone/Acetaminophen 10mg/325mg Tablet Take 1 tablet(s) by mouth tid     Meloxicam 15mg Tablet 1 tab daily     Calcium Carbonate 500mg Chewable Tablet Chew 1 tablet(s) by mouth bid     Glucosamine/Chondroitin 750mg/600mg Tablet Take 2 tablet(s) by mouth daily     Ocuvite eye vitamin daily     Vitamin D3 2,000IU Capsules 2 capsules daily     Vitamin C 100mg Tablet 1 tab bid         OBJECTIVE:        Vitals:         Current: 9/7/2018 8:56:01 AM    Ht:  5 ft, 1.5 in;  Wt: 235 lbs;  BMI: 43.7    T: 98.2 F (oral);  BP: 132/58 mm Hg (left arm, sitting);  P: 83 bpm (left arm (BP Cuff), sitting);  sCr: 0.72 mg/dL;  GFR: 73.52    VA: 20/30 OD, 20/400 OS (near, with correction)        Repeat:     9:07:40 AM     VA:    (20/30 OD,  (near, with correction, , 20/400 OS, , both eyes 20/30))         Exams:     PHYSICAL EXAM:     GENERAL: morbidly obese;  well groomed;  no  apparent distress;     EYES: nonicteric;     E/N/T:  normal EACs, TMs, nasal/oral mucosa, teeth, gingiva, and oropharynx;     NECK: range of motion is normal; thyroid exam reveals scar from surgery;  carotid exam is normal with good upstroke and no bruits;     RESPIRATORY: normal appearance and symmetric expansion of chest wall; normal respiratory rate and pattern with no distress; normal breath sounds with no rales, rhonchi, wheezes or rubs;     CARDIOVASCULAR: normal rate; rhythm is regular;  no systolic murmur;     GASTROINTESTINAL: nontender; normal bowel sounds; no organomegaly; no masses;     LYMPHATIC: no enlargement of cervical or facial nodes; no supraclavicular nodes;     MUSCULOSKELETAL: globular knees, no pedal edema, wearing support stocking Left wrist with carpal tunnel scar.     NEUROLOGIC: no lateralizing deficits.;     PSYCHIATRIC:  appropriate affect and demeanor; normal speech pattern; grossly normal memory;         ASSESSMENT           V70.0   Z00.00  Health checkup              DDx:     V79.0   Z13.89  Screening for depression              DDx:     427.31   I48.0  Paroxysmal atrial fibrillation              DDx:     278.01   E66.01  Morbid obesity              DDx:     244.0   E89.0  Hypothyroidism, postsurgical              DDx:     715.00   M15.0  Generalized osteoarthritis, site unspecified              DDx:     401.1   I10  Essential hypertension              DDx:         ORDERS:         Lab Orders:       [id number]  TSH - [facility] TSH  (Send-Out)         [id number]  HTNLP - [facility] CMP AND LIPID: [id number]  (Send-Out)         APPTO  Appointment need  (In-House)           Other Orders:         Depression screen negative  (In-House)           Calculated BMI above the upper parameter and a follow-up plan was documented in the medical record  (In-House)           Subsequent Annual Well Visit Medicare (x1)                 PLAN:          Health checkup Reviewed the preventive service  recommendations and created individualized handout.           Screening for depression     MIPS Negative Depression Screen           Orders:         Depression screen negative  (In-House)            Paroxysmal atrial fibrillation         FOLLOW-UP: Schedule a follow-up visit in 3 months..            Orders:       APPTO  Appointment need  (In-House)            Morbid obesity     MIPS     BMI Elevated - Follow-Up Plan: She was provided education on weight loss strategies           Orders:         Calculated BMI above the upper parameter and a follow-up plan was documented in the medical record  (In-House)            Hypothyroidism, postsurgical     LABORATORY:  Labs ordered to be performed today include TSH.            Orders:       [id number]  TSH [facility] TSH  (Send-Out)            Generalized osteoarthritis, site unspecified cont pain meds which at least help her stay functional          Essential hypertension     LABORATORY:  Labs ordered to be performed today include HTN/Lipid Panel: CMP, Lipid.            Orders:       [id number]  HTN - [facility] CMP AND LIPID: [id number]  (Send-Out)               Patient Recommendations:        For  Paroxysmal atrial fibrillation:     Schedule a follow-up visit in 3 months.                APPOINTMENT INFORMATION:        Monday Tuesday Wednesday Thursday Friday Saturday Sunday            Time:___________________AM  PM   Date:_____________________             CHARGE CAPTURE           **Please note: ICD descriptions below are intended for billing purposes only and may not represent clinical diagnoses**        Primary Diagnosis:         V70.0 Health checkup            Z00.00    Encounter for general adult medical examination without abnormal findings              Orders:          [id number]   Preventive medicine, established patient, age 65+ years  (In-House)                                           Subsequent Annual Well Visit Medicare (x1)         V79.0 Screening for depression             Z13.89    Encounter for screening for other disorder              Orders:          [id number] -25  Office/outpatient visit; established patient, level 3  (In-House)                Depression screen negative  (In-House)           427.31 Paroxysmal atrial fibrillation            I48.0    Paroxysmal atrial fibrillation              Orders:          APPTO   Appointment need  (In-House)           278.01 Morbid obesity            E66.01    Morbid (severe) obesity due to excess calories              Orders:             Calculated BMI above the upper parameter and a follow-up plan was documented in the medical record  (In-House)           244.0 Hypothyroidism, postsurgical            E89.0    Postprocedural hypothyroidism    715.00 Generalized osteoarthritis, site unspecified            M15.0    Primary generalized (osteo)arthritis    401.1 Essential hypertension            I10    Essential (primary) hypertension

## 2021-05-18 NOTE — PROGRESS NOTES
Radha Arana 1940     Office/Outpatient Visit    Visit Date: Tue, Mar 26, 2019 12:32 pm    Provider: Rell Harmon MD (Assistant: Naima Lara MA)    Location: [facility]        Electronically signed by Rell Harmon MD on  04/01/2019 10:46:47 AM                             SUBJECTIVE:        CC:     Shayy is a 79 year old White female.  She is here today following a transition of care from an inpatient hospital: [facility] for flu, sepsis, and pneumonia. The patient was admitted on 2/21/19 then admitted to SNU 2/27-3/14/2019. Our office called the patient within 48 hours of discharge and scheduled the follow-up appointment.. During the patient's hospital stay the patient was treated by Dr. Ulrich.          HPI:     Ms. Arana is in today follow-up on rather precarious course health events recently. She is accompanied by her daughter-in-law, with whom she lives. She had initially been admitted to [facility] for pneumonia on February 21 and then had a subsequent stay on the skilled nursing unit until March 14. Having been home for only a short period of time he returned to the hospital again on March 19th. this time for diastolic heart failure and was just discharged on March 22nd.  She is now on continuous  O2 supplementation. She also is on  Levaquin 500 mg daily  until Friday. She had lasix added to her regimen at this last hospitalization as well.     Had occasional PND, so is now using hospital bed with HOB elevated at night.     She had previously been diagnosed with sleep apnea but was not using her machine, however she is back on CPAP now and seems to be tolerating that better.     The other thing she reports recently is some difficulty swallowing at times.  We discussed possible association of pneumonia with swallowing difficulties.  Since she still is fairly weak hold off on immediate assessment to try to get her in what she builds up her strength a little bit for a  modified barium swallow.     In the meantime she is continued to have considerable back pain related to urgent degenerative disc disease.  She finds this is additional hindrance to her ability to ambulate.  She does find the use of the Norco helps with her functional status.     She does have a history of obesity.  She has lost 20 pounds since December result of her illness.  This could also be a reflection of her growing debility     ROS:     CONSTITUTIONAL:  Positive for fatigue ( severe ).      EYES:  Negative for blurred vision.      CARDIOVASCULAR:  Negative for chest pain, orthopnea, paroxysmal nocturnal dyspnea and pedal edema.      RESPIRATORY:  Negative for dyspnea.      GASTROINTESTINAL:  Positive for abdominal pain ( epigastric ) and nausea.   Negative for constipation, diarrhea or vomiting.      GENITOURINARY:  Negative for dysuria and frequent urination.      NEUROLOGICAL:  Positive for dizziness and headaches.      PSYCHIATRIC:  Positive for anxiety.          PMH/FMH/SH:     Last Reviewed on 12/10/2018 09:31 AM by Rell Harmon    Past Medical History:                 PAST MEDICAL HISTORY         Hyperlipidemia    Hypertension     Asthma: mild severity; as child, still sometimes needs albuterol;     Sleep Apnea: uses CPAP;     Gastroesophageal Reflux Disease     Renal Stones     Chronic Pain: affecting the low back;     Hypothyroidism    goiter     Epidural Steroid injection x  ,     bilateral sacrioiliac injection 10/2010     Depression     Obesity: morbid;     optic neuritis with depth perception dysfunction         GYNECOLOGICAL HISTORY:             CURRENT MEDICAL PROVIDERS:    Cardiologist: Dr. Sheppard    E/N/T: Shy (2016 is only visit)    Ophthalmologist: Dr. Fink    Orthopedist: Abigail    Pulmonologist: Candy (not been seen since )    Urologist: Dimitrios             ADVANCE DIRECTIVES: Living will at home         PREVENTIVE HEALTH MAINTENANCE             BONE DENSITY:  was last done 2017 with normal results     COLORECTAL CANCER SCREENING: declines colorectal cancer screening, understands reason for testing; defers/declines     EYE EXAM: was last done 18 The next one is due  Has on next month as well, goes every 6 months.      MAMMOGRAM: was last done 9/24/10 with normal results         PAST MEDICAL HISTORY         Positive for    Afib;     Hospitalizations:    Pneumonia last admit date 16         PAST MEDICAL HISTORY             ADVANCE DIRECTIVE Living will at home         Surgical History:         Cholecystectomy: at age 63;     Hysterectomy: at age 38's;  at age as child      Partial Thyroidectomy;    Carpal Tunnel ;     Positive for    bladder sling;;         Family History:     Father:  at age 68; Cause of death was MI     Mother: Cause of death was altzhiemers at 90     Brother(s): 3 brother(s) total; 1, at birth      Sister(s): 3 sister(s) total;  pacemaker;  Allergies ( one sister )         Social History: Honey Hernandez is her daughter     Occupation: Retired (Prior occupation: [employer], )     Marital Status:          Tobacco/Alcohol/Supplements:     Last Reviewed on 12/10/2018 08:58 AM by Naima Laar    Tobacco: She has never smoked.          Alcohol:  Does not drink alcohol and never has.          Substance Abuse History:     Last Reviewed on 2017 08:52 AM by Rell Harmon        Mental Health History:     Last Reviewed on 2017 08:52 AM by Rell Harmon        Communicable Diseases (eg STDs):     Last Reviewed on 2017 08:52 AM by Rell Harmon            Allergies:     Last Reviewed on 3/26/2019 12:48 PM by Naima Lara    Morphine:        Current Medications:     Last Reviewed on 3/26/2019 12:48 PM by Naima Lara    Duloxetine HCl 60mg Capsules, Delayed Release Take 1 capsule(s) by mouth bid     Omeprazole 20mg Capsules, Extended Release one a day     Synthroid 0.025mg  Tablet Take 1 tablet(s) by mouth daily     Neurontin 300mg Capsules Take 1 capsule(s) by mouth bid     Eliquis 5mg Tablet Take 1 tablet(s) by mouth bid     Metoprolol Succinate 50mg Tablets, Extended Release Take 1 tablet(s) by mouth daily     magnesium 250mg  1tab daily     Vitamin B12 1,000mcg Tablet 1 bid     Tolterodine 4mg Capsules, Extended Release 1 capsule daily     Multivitamin/Mineral Supplement     Albuterol 90mcg/1actuation Oral Inhaler 1 puff  PRN     Hydrocodone/Acetaminophen 10mg/325mg Tablet Take 1 tablet(s) by mouth tid     Oxybutynin Chloride 15mg Tablets, Extended Release Take 1 tablet(s) by mouth daily     Meloxicam 15mg Tablet 1 tab daily     Calcium Carbonate 500mg Chewable Tablet Chew 1 tablet(s) by mouth bid     Glucosamine/Chondroitin 750mg/600mg Tablet Take 2 tablet(s) by mouth daily     Ocuvite eye vitamin daily     Vitamin D3 2,000IU Capsules 2 capsules daily     Vitamin C 100mg Tablet 1 tab bid     Ipratropium Bromide/Albuterol 0.5mg/3mg per 3ml Nebulizer Solution every 4-6 hours as needed     Furosemide 40mg Tablets 1 by mouth  daily     Levofloxacin  500mg Tablet Take 1 tablet(s) by mouth daily  X 7 days     Lorazepam 0.5mg Tablet 1 q4h prn     Potassium Chloride 20mEq Tablets, Extended Release 1 tab daily     Probiotic one tablet po bid         OBJECTIVE:        Vitals:         Current: 3/26/2019 12:52:50 PM    Ht:  5 ft, 1.5 in;  Wt: 215.6 lbs;  BMI: 40.1    T: 98.5 F (oral);  BP: 110/35 mm Hg (right arm, sitting);  P: 106 bpm (right arm (BP Cuff), sitting);  sCr: 0.86 mg/dL;  GFR: 58.39        Repeat:     1:48:36 PM     BP:   90 (right arm, sitting, palpated- by stiles)     1:50:52 PM     BP:   108/62mm Hg (right arm, sitting, by machine)         Exams:     PHYSICAL EXAM:     GENERAL: morbidly obese;  well groomed;  no apparent distress, tired-appearing;     EYES: nonicteric;     E/N/T:  normal EACs, TMs, nasal/oral mucosa, teeth, gingiva, and oropharynx;     NECK: range of motion  is normal; thyroid exam reveals scar from surgery;  carotid exam is normal with good upstroke and no bruits;     RESPIRATORY: normal appearance and symmetric expansion of chest wall; normal respiratory rate and pattern with no distress; Diminished airflow with a few crackles in the bases;     CARDIOVASCULAR: normal rate; rhythm is regular;  no systolic murmur;     GASTROINTESTINAL: nontender; normal bowel sounds; no organomegaly; no masses;     LYMPHATIC: no enlargement of cervical or facial nodes; no supraclavicular nodes;     MUSCULOSKELETAL: globular knees, 1-2 + pedal edema     NEUROLOGIC: no lateralizing deficits.;     PSYCHIATRIC: She is conversant, pleasant, but does have a sense of concern.  No evidence of delusions or hallucinations.  She reported to have had some encephalopathic behaviors in the hospital that seems to have resolved;         ASSESSMENT           482.89   J15.8  Bacterial pneumonia, NEC              DDx:     428.42   I50.42  Combined systolic and diastolic heart failure, chronic              DDx:     327.23   G47.33  Obstructive sleep apnea (adult) (pediatric)              DDx:     787.29   R13.19  Other dysphagia              DDx:     724.2   M54.5  Low back pain              DDx:     722.52   M51.37  Degeneration of lumbar disc              DDx:     278.01   E66.01  Morbid obesity              DDx:     268.9   E55.9  Vitamin D deficiency, unspecified              DDx:         ORDERS:         Meds Prescribed:       Refill of: Furosemide 40mg Tablets 1 by mouth  daily  #30 (Thirty) tablet(s) Refills: 0       Refill of: Potassium Chloride 20mEq Tablets, Extended Release 1 tab bid  #60 (Sixty) tablet(s) Refills: 0       Zofran (Ondansetron HCl) 4mg Tablet 1 tab po Q6H PRN for nausea  #5 (Five) tablet(s) Refills: 0       Refill of: Hydrocodone/Acetaminophen 10mg/325mg Tablet Take 1 tablet(s) by mouth tid  #42 (Forty Two) tablet(s) Refills: 0         Lab Orders:       [id number]  [facility] CBC with  3 part diff  (Send-Out)         26001  NTBNP - [facility] B-Type Natriurectic peptide  (Send-Out)         61841  VITD - [facility] Vitamin D, 25 Hydroxy  (Send-Out)         11431  HTNLP - [facility] CMP AND LIPID: [id number], [id number]  (Send-Out)         APPTO  Appointment need  (In-House)                   PLAN: STOP meloxicam and Ditropan          Bacterial pneumonia, NEC Complete the antibiotics as prescribed     LABORATORY:  Labs ordered to be performed today include CBC.      FOLLOW-UP: Schedule a follow-up visit in 1 month..            Orders:       [id number]  BDCB - [facility] CBC with 3 part diff  (Send-Out)         APPTO  Appointment need  (In-House)            Combined systolic and diastolic heart failure, chronic Continue on her new medications.  She is will be following up with cardiology in the near future.     LABORATORY:  Labs ordered to be performed today include BNP and HTN/Lipid Panel: CMP, Lipid.            Prescriptions:       Refill of: Furosemide 40mg Tablets 1 by mouth  daily  #30 (Thirty) tablet(s) Refills: 0       Refill of: Potassium Chloride 20mEq Tablets, Extended Release 1 tab bid  #60 (Sixty) tablet(s) Refills: 0           Orders:       [id number]  NTBNP - [facility] B-Type Natriurectic peptide  (Send-Out)         13359  HTNLP - [facility] CMP AND LIPID: [id number], [id number]  (Send-Out)            Obstructive sleep apnea (adult) (pediatric) Emphasized the importance of the use of CPAP along with her supplemental oxygen          Other dysphagia We will plan on doing a modified barium swallow study when she gains a little strength again.           Prescriptions:       Zofran (Ondansetron HCl) 4mg Tablet 1 tab po Q6H PRN for nausea  #5 (Five) tablet(s) Refills: 0          Low back pain Continue on her current medical regimen of pain medications for now.           Prescriptions:       Refill of: Hydrocodone/Acetaminophen 10mg/325mg Tablet Take 1 tablet(s) by mouth tid  #42 (Forty Two) tablet(s) Refills: 0          Degeneration of lumbar disc Ask PT for  VNA to assess patient for appropriate walker so that we can send an order for it.          Morbid obesity She has lost some weight so hopefully should go to keep that off.  Still and is important for her to get adequate nutrition          Vitamin D deficiency, unspecified     LABORATORY:  Labs ordered to be performed today include Vitamin D.            Orders:       [id number]  VITD - [facility] Vitamin D, 25 Hydroxy  (Send-Out)               Patient Recommendations:Dragon transcription disclaimer:        Much of this encounter note is an electronic transcription/translation of spoken language to printed text.  The electronic translation of spoken language may permit erroneous, or at times, nonsensical words or phrases to be inadvertently transcribed.  Although I have reviewed the note for such errors, some may still exist.         For  Bacterial pneumonia, NEC:     Schedule a follow-up visit in 1 month.                APPOINTMENT INFORMATION:        Monday Tuesday Wednesday Thursday Friday Saturday Sunday            Time:___________________AM  PM   Date:_____________________             CHARGE CAPTURE           **Please note: ICD descriptions below are intended for billing purposes only and may not represent clinical diagnoses**        Primary Diagnosis:         482.89 Bacterial pneumonia, NEC            J15.8    Pneumonia due to other specified bacteria              Orders:          [id number]   Transitional care manage service 14 day discharge  (In-House)             APPTO   Appointment need  (In-House)           428.42 Combined systolic and diastolic heart failure, chronic            I50.42    Chronic combined systolic (congestive) and diastolic (congestive) heart failure    327.23 Obstructive sleep apnea (adult) (pediatric)            G47.33    Obstructive sleep apnea (adult) (pediatric)    787.29 Other dysphagia            R13.19    Other dysphagia    724.2 Low back pain            M54.5    Low back pain    722.52  Degeneration of lumbar disc            M51.37    Other intervertebral disc degeneration, lumbosacral region    278.01 Morbid obesity            E66.01    Morbid (severe) obesity due to excess calories    268.9 Vitamin D deficiency, unspecified            E55.9    Vitamin D deficiency, unspecified

## 2021-05-18 NOTE — PROGRESS NOTES
Radha Arana  1940     Office/Outpatient Visit    Visit Date: Mon, May 18, 2020 02:39 pm    Provider: Soo Rosado N.P. (Assistant: Ekta Donnelly MA)    Location: [facility]        Electronically signed by Soo Rosado N.P. on  2020 03:05:01 PM                             Subjective:        CC: Shayy is a 80 year old White female.  Patient has complaints of bump on left arm X 2 weeks ([facility] AUDIO CALL ); Today's encounter is being done with a telehealth visit. She has consented verbally with two witnesses for todays treatment. Todays visit is being conducted by audio only. Individuals present during the telemedicine consultation include Ekta Donnelly MA         HPI: Shayy reports reddened area to left arm. First noticed about 2 weeks ago. She has been putting compresses on it. Denies fever. She notes that it is in the same area where she puts her arm on her walker. Has been trying to keep covered.    ROS:     CONSTITUTIONAL:  Negative for chills and fever.      CARDIOVASCULAR:  Negative for chest pain and palpitations.      RESPIRATORY:  Negative for dyspnea and frequent wheezing.      INTEGUMENTARY/BREAST:  Positive for area of redness to left arm.      NEUROLOGICAL:  Negative for dizziness and headaches.          Past Medical History / Family History / Social History:         Last Reviewed on 2019 12:31 PM by Rell Harmon    Past Medical History:                 PAST MEDICAL HISTORY         Hyperlipidemia    Hypertension     Asthma: mild severity; as child, still sometimes needs albuterol;     Sleep Apnea: uses CPAP;     Gastroesophageal Reflux Disease     Renal Stones     Chronic Pain: affecting the low back;     Hypothyroidism    goiter     Epidural Steroid injection x 2007    bilateral sacrioiliac injection 10/2010     Depression     Obesity: morbid;     optic neuritis with depth perception dysfunction         GYNECOLOGICAL HISTORY:              CURRENT MEDICAL PROVIDERS:    Cardiologist: Dr. Sheppard    E/N/T: Shy (2016 is only visit)    Ophthalmologist: Dr. Fink    Orthopedist: Abigail    Pulmonologist: Candy (not been seen since )    Urologist: Dimitrios             ADVANCE DIRECTIVES: Living will at home         PREVENTIVE HEALTH MAINTENANCE             BONE DENSITY: was last done 2017 with normal results     COLORECTAL CANCER SCREENING: declines colorectal cancer screening, understands reason for testing; defers/declines     EYE EXAM: was last done 18 The next one is due  Has on next month as well, goes every 6 months.      MAMMOGRAM: was last done 9/24/10 with normal results         PAST MEDICAL HISTORY         Positive for    Afib;     Hospitalizations:    Pneumonia last admit date 16         PAST MEDICAL HISTORY             ADVANCE DIRECTIVE Living will at home         Surgical History:         Cholecystectomy: at age 63;     Hysterectomy: at age 38's;  at age as child     Partial Thyroidectomy;    Carpal Tunnel ;     Positive for    bladder sling;;         Family History:     Father:  at age 68; Cause of death was MI     Mother: Cause of death was altzhiemers at 90     Brother(s): 3 brother(s) total; 1, at birth      Sister(s): 3 sister(s) total;  pacemaker;  Allergies ( one sister )         Social History: Honey Hernandez is her daughter     Occupation: Retired (Prior occupation: [employer], )     Marital Status:          Tobacco/Alcohol/Supplements:     Last Reviewed on 2020 12:12 PM by Daya Cruz    Tobacco: She has never smoked.          Alcohol:  Does not drink alcohol and never has.          Substance Abuse History:     Last Reviewed on 2017 08:52 AM by Rell Harmon        Mental Health History:     Last Reviewed on 2017 08:52 AM by Rell Harmon        Communicable Diseases (eg STDs):     Last Reviewed on 2017 08:52 AM by Rell Harmon  Kiran        Current Problems:     Last Reviewed on 11/05/2018 11:30 AM by Susan Le    Impaired glucose tolerance (oral)    Primary generalized (osteo)arthritis    Low back pain    Long term (current) use of anticoagulants    Postprocedural hypothyroidism    Morbid (severe) obesity due to excess calories    Bilateral primary osteoarthritis of knee    Obstructive sleep apnea (adult) (pediatric)    Tachycardia, unspecified    Unspecified voice and resonance disorder    Opioid use, unspecified, uncomplicated    Tachycardia, unspecified    Paroxysmal atrial fibrillation    Nasal mucositis (ulcerative)    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    Spinal stenosis, site unspecified    Encntr for f/u exam aft trtmt for cond oth than malig neoplm    Vitamin D deficiency, unspecified    Chronic combined systolic (congestive) and diastolic (congestive) heart failure    Other specified disorders of bone density and structure, unspecified site        Immunizations:     VAQTA -adult dose (HepA) 2/14/2019    Prevnar 13 (Pneumococcal PCV 13) 3/26/2015    Fluvirin (4 + years dose) 9/22/2014    Fluzone (3 + years dose) 9/27/2011    Fluzone (3 + years dose) 11/13/2012    Fluzone High-Dose pf (>=65 yr) 10/16/2013    Fluzone High-Dose pf (>=65 yr) 9/21/2015    Fluzone High-Dose pf (>=65 yr) 9/21/2016    Fluzone High-Dose pf (>=65 yr) 10/2/2017    Fluzone High-Dose pf (>=65 yr) 10/1/2018    Fluzone High-Dose pf (>=65 yr) 10/1/2019    Pneumococcal, 23-valent IM/SC (adult and pt >=2yr) 2/23/2011    Boostrix (Tdap) 4/17/2015    Zostavax (Zoster live) 4/17/2015        Allergies:     Last Reviewed on 5/18/2020 02:39 PM by Ekta Donnelly    Morphine:          Current Medications:     Last Reviewed on 5/18/2020 02:40 PM by Ekta Donnelly    Ventolin HFA 90 mcg/actuation Inhalation HFA Aerosol Inhaler [inhale 2 puffs (180 mcg) by inhalation route every 4-6 hours as needed]    Multivitamin/Mineral Supplement      omeprazole 20 mg oral capsule,delayed release (enteric coated) [TAKE 1 CAPSULE BY MOUTH EVERY DAY]    levothyroxine 25 mcg oral tablet [TAKE 1 TABLET(S) BY MOUTH DAILY]    gabapentin 300 mg oral capsule [TAKE 1 CAPSULE BY MOUTH TWICE DAILY]    Oxybutynin Chloride 15 mg oral Tablet, Extended Release 24 hr [Take 1 tablet(s) by mouth daily]    Eliquis 5 mg oral tablet [Take 1 tablet(s) by mouth bid]    metoprolol succinate 50 mg oral Tablet, Extended Release 24 hr [Take 1 tablet(s) by mouth daily]    Colace 100 mg oral capsule [1 QOD]    DULoxetine 60 mg oral capsule,delayed release (enteric coated) [TAKE 1 CAPSULE(S) BY MOUTH TWICE DAILY]    ipratropium-albuterol 0.5 mg-3 mg(2.5 mg base)/3 mL Inhalation Solution for Nebulization [Use 1 vial(s) by nebulizer qid]    Probiotic one tablet po bid      Furosemide 40 mg oral tablet [1 by mouth  daily]    Potassium Chloride 20 mEq oral Tablet, Extended Release Particles/Crystals [1 tab bid ]    Melatonin 5 mg oral tablet [take two tablets qhs]    HYDROcodone-acetaminophen  mg oral tablet [Take 1 tablet(s) by mouth bid prn]        Objective:        Exams:     PHYSICAL EXAM:     RESPIRATORY: no sounds of respiratory distress over telephone;     NEUROLOGIC: mental status: alert and oriented x 3;     PSYCHIATRIC: appropriate affect and demeanor; normal speech pattern;         Assessment:         L08.9   Local infection of the skin and subcutaneous tissue, unspecified           ORDERS:         Meds Prescribed:       [New Rx] cefdinir 300 mg oral capsule [take 1 capsule (300 mg) by oral route every 12 hours], #20 (twenty) capsules, Refills: 0 (zero)                 Plan:         Local infection of the skin and subcutaneous tissue, unspecifiedPicture was sent to allergy room cell phone. Visualized and noted to have area of redness and some swelling to left forearm. Will treat for infection with oral antibiotics. Advised to follow up if not improving or worsening symptoms  develop such as uncontrolled fever. Try to keep area covered when using walker so as to prevent irritation.    Telehealth: Verbal consent obtained for visit to occur via phone call; Staff, other than provider, present during telephone visit include Ekta Donnelly MA; Total time spent was 7 minutes; [id number]--Inedlyody E/M 5-10 minutes     FOLLOW-UP: Chronic visit follow up:Patient already scheduled for follow-up appointment with PCP           Prescriptions:       [New Rx] cefdinir 300 mg oral capsule [take 1 capsule (300 mg) by oral route every 12 hours], #20 (twenty) capsules, Refills: 0 (zero)             Charge Capture:         Primary Diagnosis:     L08.9  Local infection of the skin and subcutaneous tissue, unspecified           Orders:      [id number]  Phys/QHP telephone evaluation 5-10 min  (In-House)

## 2021-05-18 NOTE — PROGRESS NOTES
Radha Arana 1940     Office/Outpatient Visit    Visit Date:  10:03 am    Provider: Johanne De Jesus N.P. (Assistant: Althea Ferreira MA)    Location: [facility]        Electronically signed by Johanne De Jesus N.P. on  2018 10:46:53 AM                             SUBJECTIVE:        CC:     Shayy is a 78 year old White female.  Patient complains of cough and chest congestion.;         HPI:         Acute upper respiratory infection of multiple sites noted.  These have been present for the past one week.  The symptoms include Chills, cough and nasal discharge.  She denies fever.  She has already tried to relieve the symptoms with mucinex.  patient reports she is feeling worse     ROS:     CONSTITUTIONAL:  Positive for chills.   Negative for fatigue or fever.      CARDIOVASCULAR:  Negative for chest pain, dizziness, palpitations and pedal edema.      RESPIRATORY:  Positive for recent cough.   Negative for dyspnea, hemoptysis, pleuritic chest pain or frequent wheezing.      GASTROINTESTINAL:  Negative for abdominal pain, nausea and vomiting.      NEUROLOGICAL:  Negative for dizziness and headaches.          PMH/FMH/SH:     Last Reviewed on 2017 08:52 AM by Rell Harmon    Past Medical History:                 PAST MEDICAL HISTORY         Hyperlipidemia    Hypertension     Asthma: mild severity; as child, still sometimes needs albuterol;     Sleep Apnea: uses CPAP;     Gastroesophageal Reflux Disease     Renal Stones     Chronic Pain: affecting the low back;     Hypothyroidism    goiter     Epidural Steroid injection x 2007    bilateral sacrioiliac injection 10/2010     Depression     Obesity: morbid;     optic neuritis with depth perception dysfunction         GYNECOLOGICAL HISTORY:             CURRENT MEDICAL PROVIDERS:    Cardiologist: Dr. Sheppard    E/N/T: Shy (2016 is only visit)    Ophthalmologist: Dr. Fink    Orthopedist: Abigail     Pulmonologist: Candy (not been seen since )             ADVANCE DIRECTIVES: Living will at home         PREVENTIVE HEALTH MAINTENANCE             BONE DENSITY: was last done 2017 with normal results     COLORECTAL CANCER SCREENING: declines colorectal cancer screening, understands reason for testing; defers/declines     MAMMOGRAM: was last done 9/24/10         PAST MEDICAL HISTORY         Hospitalizations:    Pneumonia last admit date 16         PAST MEDICAL HISTORY             ADVANCE DIRECTIVE Living will at home         Surgical History:         Cholecystectomy: at age 63;     Hysterectomy: at age 38's;  at age as child      Partial Thyroidectomy;     Positive for    bladder sling;;         Family History:     Father:  at age 68; Cause of death was MI     Mother: Cause of death was altzhiemers at 90     Brother(s): 3 brother(s) total; 1, at birth      Sister(s): 3 sister(s) total;  pacemaker;  Allergies ( one sister )         Social History: Honey Hernandez is her daughter     Occupation: Retired (Prior occupation: [employer], )     Marital Status:          Tobacco/Alcohol/Supplements:     Last Reviewed on 2018 10:03 AM by Althea Ferreira    Tobacco: She has never smoked.          Alcohol:  Does not drink alcohol and never has.          Substance Abuse History:     Last Reviewed on 2017 08:52 AM by Rell Harmon        Mental Health History:     Last Reviewed on 2017 08:52 AM by Rell Harmon        Communicable Diseases (eg STDs):     Last Reviewed on 2017 08:52 AM by Rell Harmon            Current Problems:     Last Reviewed on 2017 08:52 AM by Rell Harmon    Neoplasm of uncertain behavior: parotid gland     Use of high risk medications     Nasal lesion     Osteoarthritis of knee     Paroxysmal atrial fibrillation     Tachycardia, unspecified     Hoarseness     Sleep apnea     Leukocytosis, unspecified      Cystocele, midline     Other high-risk medications     Morbid obesity     Degeneration of lumbar disc     Hypothyroidism, postsurgical     Essential hypertension     Low back pain     Hypercholesterolemia     Hypercalcemia     Patient visit for long term (current) use of other drugs     Elevated fasting glucose     GERD     Prediabetes     Generalized osteoarthritis, site unspecified     Abnormal mammogram, unspecified         Allergies:     Last Reviewed on 6/04/2018 10:05 AM by Althea Ferreira    Morphine:        Current Medications:     Last Reviewed on 6/04/2018 10:05 AM by Althea Ferreira    Omeprazole 20mg Capsules, Extended Release one a day     Synthroid 0.025mg Tablet Take 1 tablet(s) by mouth daily     Neurontin 300mg Capsules Take 1 capsule(s) by mouth bid     Eliquis 5mg Tablet Take 1 tablet(s) by mouth bid     Metoprolol Succinate 50mg Tablets, Extended Release Take 1 tablet(s) by mouth daily     magnesium 250mg  1tab daily     Vitamin B12 1,000mcg Tablet 1 bid     Tolterodine 4mg Capsules, Extended Release 1 capsule daily     Multivitamin/Mineral Supplement     Albuterol 90mcg/1actuation Oral Inhaler 1 puff  PRN     Hydrocodone/Acetaminophen 10mg/325mg Tablet Take 1 tablet(s) by mouth tid     Duloxetine HCl 60mg Capsules, Delayed Release Take 1 capsule(s) by mouth bid     Calcium Carbonate 500mg Chewable Tablet Chew 1 tablet(s) by mouth bid     Glucosamine/Chondroitin 750mg/600mg Tablet Take 2 tablet(s) by mouth daily     Ocuvite eye vitamin daily     Vitamin D3 2,000IU Capsules 2 capsules daily     Vitamin C 100mg Tablet 1 tab bid         OBJECTIVE:        Vitals:         Current: 6/4/2018 10:05:45 AM    Ht:  5 ft, 1.5 in;  Wt: 223.6 lbs;  BMI: 41.6    T: 97.4 F (oral);  P: 94 bpm (finger clip, sitting);  sCr: 0.72 mg/dL;  GFR: 71.99    O2 Sat: 94 % (room air)        Exams:     PHYSICAL EXAM:     GENERAL:  well developed and nourished; appropriately groomed; in no apparent distress;     E/N/T:   normal EACs, TMs, nasal/oral mucosa, teeth, gingiva, and oropharynx;     NECK: range of motion is normal;     RESPIRATORY: normal respiratory rate and pattern with no distress; normal breath sounds with no rales, rhonchi, wheezes or rubs;     CARDIOVASCULAR: normal rate; rhythm is regular;  no systolic murmur;     LYMPHATIC: no enlargement of cervical or facial nodes;     MUSCULOSKELETAL: normal gait;     NEUROLOGIC: mental status: alert and oriented x 3;         ASSESSMENT           Acute upper respiratory infection of multiple sites    465.8   J06.9  Acute upper respiratory infection of multiple sites              DDx:         ORDERS:         Meds Prescribed:       Augmentin (Amoxicillin/Clavulanate) 875mg/125mg Tablet one tab BID times 10 days  #20 (Twenty) tablet(s) Refills: 0                 PLAN:          Acute upper respiratory infection of multiple sites         RECOMMENDATIONS given include: rest and increase oral fluid intake.      Discussed s/s of pneumonia with patient. RTC for an OV or go to ER for new/worsening sx including fever, SOA, chest pain, pain with breathing, can't get a deep breath. RTC for an OV if sx do not resolve with abx.            Prescriptions:       Augmentin (Amoxicillin/Clavulanate) 875mg/125mg Tablet one tab BID times 10 days  #20 (Twenty) tablet(s) Refills: 0             CHARGE CAPTURE           **Please note: ICD descriptions below are intended for billing purposes only and may not represent clinical diagnoses**        Primary Diagnosis:         Acute upper respiratory infection of multiple sites    465.8 Acute upper respiratory infection of multiple sites            J06.9    Acute upper respiratory infection, unspecified              Orders:          [id number]   Office/outpatient visit; established patient, level 3  (In-House)

## 2021-05-18 NOTE — PROGRESS NOTES
Radha Arana  1940     Office/Outpatient Visit    Visit Date: Tue, May 4, 2021 10:31 am    Provider: Rell Harmon MD (Assistant: Daya Cruz MA)    Location: [facility]        Electronically signed by Rell Harmon MD on  05/04/2021 11:09:59 AM                             Subjective:        CC: Shayy is a 81 year old White female.  This is a follow-up visit.  6 months;         HPI:       Here with her daughter Honey to follow-up on her chronic health conditions. They state they had to miss her dietitian visit and called for rescheduling but never heard back.  Her sugars have been doing fine.Far as her generalized arthritis and pain she says continues to have issues but she functionally is doing okay.  Uses a Rollator for ambulation.As far as her atrial fibrillation she felt palpitations for the first time just last week.  Most this time she is asymptomatic.  Blood pressure is noted to be low today but she is not having any symptoms.  She will occasionally get low readings in the morning on average says her blood pressures are in the 100s over 70s    ROS:     CONSTITUTIONAL:  Positive for fatigue.   Negative for chills or fever.      EYES:  Negative for blurred vision.      CARDIOVASCULAR:  Negative for chest pain, orthopnea, paroxysmal nocturnal dyspnea and pedal edema.      RESPIRATORY:  Negative for dyspnea.      GASTROINTESTINAL:  Negative for abdominal pain, constipation, diarrhea, nausea and vomiting.      GENITOURINARY:  Negative for dysuria and frequent urination.      NEUROLOGICAL:  Negative for dizziness, headaches, paresthesias, and weakness.      PSYCHIATRIC:  Negative for anxiety, depression, and sleep disturbances.          Past Medical History / Family History / Social History:         Last Reviewed on 5/04/2021 10:47 AM by Rell Harmon    Past Medical History:                 PAST MEDICAL HISTORY         Hyperlipidemia    Hypertension     Asthma: mild  severity; as child, still sometimes needs albuterol;     Sleep Apnea: uses CPAP;     Gastroesophageal Reflux Disease     Renal Stones     Chronic Pain: affecting the low back;     Hypothyroidism    goiter     Epidural Steroid injection x 2 ,     bilateral sacrioiliac injection 10/2010     Depression     Obesity: morbid;     optic neuritis with depth perception dysfunction         GYNECOLOGICAL HISTORY:             CURRENT MEDICAL PROVIDERS:    Cardiologist: Dr. Sheppard    E/N/T: Shy (2016 is only visit)    Ophthalmologist: Dr. Fink    Orthopedist: Abigail    Pulmonologist: Candy (not been seen since )    Urologist: Dimitrios             ADVANCE DIRECTIVES: Living will at home         PREVENTIVE HEALTH MAINTENANCE             BONE DENSITY: was last done 2017 with normal results     COLORECTAL CANCER SCREENING: declines colorectal cancer screening, understands reason for testing; defers/declines     DENTAL CLEANING: was last done      EYE EXAM: was last done  The next one is due  Has on next month as well, goes every 6 months.      MAMMOGRAM: was last done 9/24/10 with normal results         PAST MEDICAL HISTORY         Positive for    Afib;     Hospitalizations:    Pneumonia last admit date 16         PAST MEDICAL HISTORY             ADVANCE DIRECTIVE Living will at home         Surgical History:         Cholecystectomy: at age 63;     Hysterectomy: at age 38's;  at age as child     Partial Thyroidectomy;    Carpal Tunnel ;     Positive for    bladder sling;;         Family History:     Father:  at age 68; Cause of death was MI     Mother: Cause of death was altzhiemers at 90     Brother(s): 3 brother(s) total; 1, at birth      Sister(s): 3 sister(s) total;  pacemaker;  Allergies ( one sister )         Social History: Honey Hernandez is her daughter     Occupation: Retired (Prior occupation: [employer], )     Marital Status:           Tobacco/Alcohol/Supplements:     Last Reviewed on 5/04/2021 10:37 AM by Daya Cruz    Tobacco: She has never smoked.          Alcohol:  Does not drink alcohol and never has.          Substance Abuse History:     Last Reviewed on 9/06/2017 08:52 AM by Rell Harmon        Mental Health History:     Last Reviewed on 9/06/2017 08:52 AM by Rell Harmon        Communicable Diseases (eg STDs):     Last Reviewed on 9/06/2017 08:52 AM by Rell Harmon        Allergies:     Last Reviewed on 5/04/2021 10:37 AM by Daya Cruz    Morphine:          Current Medications:     Last Reviewed on 5/04/2021 10:37 AM by Daya Cruz    Ventolin HFA 90 mcg/actuation Inhalation HFA Aerosol Inhaler [inhale 2 puffs (180 mcg) by inhalation route every 4-6 hours as needed]    Multivitamin/Mineral Supplement     omeprazole 20 mg oral capsule,delayed release (enteric coated) [TAKE 1 CAPSULE BY MOUTH EVERY DAY]    levothyroxine 25 mcg oral tablet [TAKE 1 TABLET(S) BY MOUTH DAILY]    gabapentin 300 mg oral capsule [TAKE 1 CAPSULE BY MOUTH TWICE DAILY]    oxybutynin chloride 15 mg oral Tablet, Extended Release 24 hr [TAKE ONE TABLET BY MOUTH EVERY NIGHT AT BEDTIME]    Eliquis 5 mg oral tablet [Take 1 tablet(s) by mouth bid]    metoprolol succinate 50 mg oral Tablet, Extended Release 24 hr [Take 1 tablet(s) by mouth daily]    Colace 100 mg oral capsule [1 QOD]    DULoxetine 60 mg oral capsule,delayed release (enteric coated) [TAKE 1 CAPSULE(S) BY MOUTH TWICE DAILY]    Probiotic one tablet po bid      potassium chloride 20 mEq oral Tablet, Extended Release Particles/Crystals [TAKE ONE TABLET TWICE DAILY]    furosemide 40 mg oral tablet [TAKE ONE TABLET EVERY DAY]    ipratropium-albuterol 0.5 mg-3 mg(2.5 mg base)/3 mL Inhalation Solution for Nebulization [Use 1 vial(s) by nebulizer qid]    Melatonin 5 mg oral tablet [take two tablets qhs]    metFORMIN 500 mg oral Tablet, Extended Release Gastric Retention 24 hr [take 1  tablet (500 mg) by oral route once daily with the evening meal]    lisinopriL 10 mg oral tablet [take 1 tablet (10 mg) by oral route once daily]        Objective:        Vitals:         Current: 5/4/2021 10:40:01 AM    Ht:  5 ft, 1.5 in;  Wt: 195.8 lbs;  BMI: 36.4T: 97.7 F (temporal);  BP: 88/61 mm Hg (left arm, sitting);  P: 85 bpm (left arm (BP Cuff), sitting);  sCr: 0.93 mg/dL;  GFR: 50.15O2 Sat: 100 % (room air)        Repeat:     10:56:26 AM  BP:   94/60mm Hg (left arm, sitting, by stiles)     Exams:     PHYSICAL EXAM:     GENERAL: morbidly obese;  well groomed;  no apparent distress;     EYES: nonicteric;     E/N/T:  normal EACs, TMs, nasal/oral mucosa, teeth, gingiva, and oropharynx;     NECK: range of motion is normal; thyroid exam reveals scar from surgery;  carotid exam is normal with good upstroke and no bruits;     RESPIRATORY: normal appearance and symmetric expansion of chest wall; normal respiratory rate and pattern with no distress; Diminished airflow with a few crackles in the bases;     CARDIOVASCULAR: rate is 80's bpm;;  rhythm is regular;  no systolic murmur;     LYMPHATIC: no enlargement of cervical or facial nodes; no supraclavicular nodes;     MUSCULOSKELETAL: globular knees, no pedal edema, wearing compression stockings     NEUROLOGIC: no lateralizing deficits.;     PSYCHIATRIC: Mood is good.  No delusions or hallucinations.;         Assessment:         E11.9   Type 2 diabetes mellitus without complications       M15.0   Primary generalized (osteo)arthritis       I48.0   Paroxysmal atrial fibrillation       I50.42   Chronic combined systolic (congestive) and diastolic (congestive) heart failure       M48.00   Spinal stenosis, site unspecified           ORDERS:         Lab Orders:       [id number]  DIAB - [facility] LIPID,CMP, A1C: [id number], [id number], [id number]  (Send-Out)                      Plan: Did get Covid vaccine x2        Type 2 diabetes mellitus without complicationsSince her last hemoglobin A1c looked  okay I am not overly concerned about missing the dietitian appointment.  We will see what labs look like today to determine if needs to be rescheduled.    LABORATORY:  Labs ordered to be performed today include Diabetes Panel 1; CMP, Lipid, A1C.            Orders:       [id number]  [facility]AB[address] [place] [address] LIPID,CMP, A1C: 95230, [id number], [id number]  (Send-Out)              Primary generalized (osteo)arthritisContinue on her current regimen.  She remains functional        Paroxysmal atrial fibrillationTo be in sinus rhythm today.Continue on current regimen.  Has follow-up visit with cardiology in the near future.  She is anticoagulated.        Chronic combined systolic (congestive) and diastolic (congestive) heart failureContinue on current regimen.  Functionally is doing okay        Spinal stenosis, site unspecifiedContinue on current regimen.  Functionally is doing okay            Other Patient Education Handouts:     Dragon transcription disclaimer:        Much of this encounter note is an electronic transcription/translation of spoken language to printed text.  The electronic translation of spoken language may permit erroneous, or at times, nonsensical words or phrases to be inadvertently transcribed.  Although I have reviewed the note for such errors, some may still exist.        Charge Capture:         Primary Diagnosis:     E11.9  Type 2 diabetes mellitus without complications           Orders:      [id number]  Office/outpatient visit; established patient, level 4  (In-House)              M15.0  Primary generalized (osteo)arthritis     I48.0  Paroxysmal atrial fibrillation     I50.42  Chronic combined systolic (congestive) and diastolic (congestive) heart failure     M48.00  Spinal stenosis, site unspecified

## 2021-05-18 NOTE — PROGRESS NOTES
Radha Arana  1940     Office/Outpatient Visit    Visit Date: Mon, Nov 9, 2020 10:37 am    Provider: Rell Harmon MD (Assistant: Izabel Hansen RN)    Location: [facility]        Electronically signed by Rell Harmon MD on  11/09/2020 09:43:16 PM                             Subjective:        CC: Shayy is a 80 year old White female.  This is a follow-up visit.  physical exam Accompanied by her daughter Honey        HPI:           Shayy is here for a Medicare wellness visit.  A current height, weight, BMI, blood pressure, and pulse were recorded in the vitals section of the note and have been reviewed. Patient's medications, including supplements, were recorded in the chart and reviewed.          Self-Assessment of Health: She rates her health as very good. She rates her confidence of being able to control/manage most of her health problems as very confident. Her physical/emotional health has limited her social activites moderately.  A review of possible cognitive impairment was performed and the following was noted: she is not having trouble driving;  memory changes are noted;  she is not having trouble with her finances;  Memory Impairment Screen is normal.  A review of functional ability, including bathing, dressing, walking, and urine/bowel continence as well as level of safety was performed and was found to be negative.  Falls Risk: Has not had any falls or only one fall without injury in the past year.  In regard to hearing, she reports having trouble hearing the TV/radio when others do not and having to strain to hear or understand conversations, but not wearing hearing aid(s).  Concerning home safety, she reports that at home she DOES have adequate lighting, a skid resistant shower/tub, grab bars in the bath, handrails on stairs and functioning smoke alarms, but not absence of throw rugs.          Immunization Status: NOt had Hepatitis B; Physical Activity:  She never excercises.; Type of diet patient normally eats is described as poor--needs improvement.   Preventative Health updated today           PHQ-9 Depression Screening: Completed form scanned and in chart; Total Score enter total score : 0       Has a history of osteoarthritis.  Her knees are particularly affected.Will be getting injection into the knee soon from orthopedics.Was able to wean off of Norco back in July/August and doesn't think the pain is any worse.  Thinks may have  be addicted.  She does continue to take gabapentin for her neuropathy/spinal radiculopathy from spinal stenosis issues.          Dx with postprocedural hypothyroidism; she denies any related symptoms.  She reports no symptoms suggestive of adverse medication effect.        She does suffer from considerable obesity.  She knows that she would do well to lose weight.  She also has sleep apnea.  Says that she has been doing a lot better with the sleep apnea since she has gotten a new mask.      History of atrial fibrillation.  She is anticoagulated with Eliquis.  She is not aware of any palpitations.  No symptoms of worsening congestive heart failure.      We did review her last bone density study.  He does have some osteopenia.    ROS:     CONSTITUTIONAL:  Negative for chills, fatigue, fever, and weight change.      EYES:  Negative for blurred vision.      CARDIOVASCULAR:  Negative for chest pain, orthopnea, paroxysmal nocturnal dyspnea and pedal edema.      RESPIRATORY:  Negative for dyspnea.      GASTROINTESTINAL:  Negative for abdominal pain, constipation, diarrhea, nausea and vomiting.      GENITOURINARY:  Negative for dysuria and frequent urination.      NEUROLOGICAL:  Negative for dizziness, headaches, paresthesias, and weakness.      PSYCHIATRIC:  Negative for anxiety, depression, and sleep disturbances.          Past Medical History / Family History / Social History:         Last Reviewed on 11/09/2020 09:41 PM by Rell Harmon  Kiran    Past Medical History:                 PAST MEDICAL HISTORY         Hyperlipidemia    Hypertension     Asthma: mild severity; as child, still sometimes needs albuterol;     Sleep Apnea: uses CPAP;     Gastroesophageal Reflux Disease     Renal Stones     Chronic Pain: affecting the low back;     Hypothyroidism    goiter     Epidural Steroid injection x  ,     bilateral sacrioiliac injection 10/2010     Depression     Obesity: morbid;     optic neuritis with depth perception dysfunction         GYNECOLOGICAL HISTORY:             CURRENT MEDICAL PROVIDERS:    Cardiologist: Dr. Sheppard    E/N/T: Shy (2016 is only visit)    Ophthalmologist: Dr. Fink    Orthopedist: Abigail    Pulmonologist: Candy (not been seen since )    Urologist: Dimitrios             ADVANCE DIRECTIVES: Living will at home         PREVENTIVE HEALTH MAINTENANCE             BONE DENSITY: was last done 2017 with normal results     COLORECTAL CANCER SCREENING: declines colorectal cancer screening, understands reason for testing; defers/declines     DENTAL CLEANING: was last done      EYE EXAM: was last done  The next one is due  Has on next month as well, goes every 6 months.      MAMMOGRAM: was last done 9/24/10 with normal results         PAST MEDICAL HISTORY         Positive for    Afib;     Hospitalizations:    Pneumonia last admit date 16         PAST MEDICAL HISTORY             ADVANCE DIRECTIVE Living will at home         Surgical History:         Cholecystectomy: at age 63;     Hysterectomy: at age 38's;  at age as child     Partial Thyroidectomy;    Carpal Tunnel ;     Positive for    bladder sling;;         Family History:     Father:  at age 68; Cause of death was MI     Mother: Cause of death was altzhiemers at 90     Brother(s): 3 brother(s) total; 1, at birth      Sister(s): 3 sister(s) total;  pacemaker;  Allergies ( one sister )         Social History: Honey Hernandez is her daughter      Occupation: Retired (Prior occupation: [employer], )     Marital Status:          Tobacco/Alcohol/Supplements:     Last Reviewed on 5/18/2020 02:39 PM by Ekta Donnelly    Tobacco: She has never smoked.          Alcohol:  Does not drink alcohol and never has.          Substance Abuse History:     Last Reviewed on 9/06/2017 08:52 AM by Rell Harmon        Mental Health History:     Last Reviewed on 9/06/2017 08:52 AM by Rell Harmon        Communicable Diseases (eg STDs):     Last Reviewed on 9/06/2017 08:52 AM by Rell Harmon        Allergies:     Last Reviewed on 5/18/2020 02:39 PM by Ekta Donnelly    Morphine:          Current Medications:     Last Reviewed on 11/09/2020 10:56 AM by Izabel Hansen HFA 90 mcg/actuation Inhalation HFA Aerosol Inhaler [inhale 2 puffs (180 mcg) by inhalation route every 4-6 hours as needed]    Multivitamin/Mineral Supplement     omeprazole 20 mg oral capsule,delayed release (enteric coated) [TAKE 1 CAPSULE BY MOUTH EVERY DAY]    levothyroxine 25 mcg oral tablet [TAKE 1 TABLET(S) BY MOUTH DAILY]    gabapentin 300 mg oral capsule [TAKE 1 CAPSULE BY MOUTH TWICE DAILY]    oxybutynin chloride 15 mg oral Tablet, Extended Release 24 hr [TAKE ONE TABLET EVERY NIGHT AT BEDTIME]    Eliquis 5 mg oral tablet [Take 1 tablet(s) by mouth bid]    metoprolol succinate 50 mg oral Tablet, Extended Release 24 hr [Take 1 tablet(s) by mouth daily]    Colace 100 mg oral capsule [1 QOD]    DULoxetine 60 mg oral capsule,delayed release (enteric coated) [TAKE 1 CAPSULE(S) BY MOUTH TWICE DAILY]    Probiotic one tablet po bid      ipratropium-albuterol 0.5 mg-3 mg(2.5 mg base)/3 mL Inhalation Solution for Nebulization [Use 1 vial(s) by nebulizer qid]    furosemide 40 mg oral tablet [TAKE ONE TABLET EVERY DAY]    potassium chloride 20 mEq oral Tablet, Extended Release Particles/Crystals [TAKE ONE TABLET TWICE DAILY]    Melatonin 5 mg oral tablet  [take two tablets qhs]        Objective:        Vitals:         Current: 11/9/2020 10:39:16 AM    Ht:  5 ft, 1.5 in;  Wt: 207 lbs;  BMI: 38.5T: 96.9 F (oral);  BP: 131/65 mm Hg (left arm, sitting);  P: 89 bpm (left arm (BP Cuff), sitting);  sCr: 0.9 mg/dL;  GFR: 53.95O2 Sat: 98 % (room air)VA: 20/50 OD, 20/400 OS (near, with correction)        Exams:     PHYSICAL EXAM:     GENERAL: morbidly obese;  well groomed;  no apparent distress;     EYES: nonicteric;     E/N/T:  normal EACs, TMs, nasal/oral mucosa, teeth, gingiva, and oropharynx;     NECK: range of motion is normal; thyroid exam reveals scar from surgery;  carotid exam is normal with good upstroke and no bruits;     RESPIRATORY: normal appearance and symmetric expansion of chest wall; normal respiratory rate and pattern with no distress; Diminished airflow with a few crackles in the bases;     CARDIOVASCULAR: normal rate; rhythm is irregular;  no systolic murmur;     GASTROINTESTINAL: nontender; normal bowel sounds; no organomegaly; no masses;     GENITOURINARY: no CVAT;     LYMPHATIC: no enlargement of cervical or facial nodes; no supraclavicular nodes;     MUSCULOSKELETAL: globular knees, 1-2 + pedal edema Left knee slightly warm.    NEUROLOGIC: no lateralizing deficits.;     PSYCHIATRIC: Mood is kind of upbeat today.  She is proud of herself for getting off of the pain medication..  No delusions or hallucinations.;         Lab/Test Results:         Urine temperature: confirmed (11/09/2020),     All urine drug screen levels confirmed negative: yes (11/09/2020),     Date and time of last pill: gabapentin 11-9-20 at 0500/kh (11/09/2020),     Performed by: tls (11/09/2020),     Collection Time: 1120 (11/09/2020),             Assessment:         Z00.00   Encounter for general adult medical examination without abnormal findings       Z13.31   Encounter for screening for depression       M15.0   Primary generalized (osteo)arthritis       E89.0   Postprocedural  hypothyroidism       E66.01   Morbid (severe) obesity due to excess calories       G47.33   Obstructive sleep apnea (adult) (pediatric)       I48.0   Paroxysmal atrial fibrillation       M85.80   Other specified disorders of bone density and structure, unspecified site       M48.00   Spinal stenosis, site unspecified           ORDERS:         Lab Orders:       55348  TSH - [facility] TSH  (Send-Out)            90465  COMP - [facility] Comp. Metabolic Panel  (Send-Out)            15362  Drug test prsmv qual dir optical obs per day  (In-House)            11356  BDCB2 - [facility] CBC w/o diff  (Send-Out)            52106  VITD - [facility] Vitamin D, 25 Hydroxy  (Send-Out)              Other Orders:         Depression screen negative  (In-House)              Subsequent Annual Well Visit Medicare  (x1)                  Plan:         Encounter for general adult medical examination without abnormal findingsWe reviewed the preventive service recommendations and created an individualized handout        Encounter for screening for depression    MIPS Negative Depression Screen           Orders:         Depression screen negative  (In-House)              Primary generalized (osteo)arthritisContinue with Tylenol and local care along with injections by orthopedics        Postprocedural hypothyroidismCheck lab and predicate medication change based on that result          Orders:       46874  TSH - [facility] TSH  (Send-Out)              Morbid (severe) obesity due to excess caloriesContinue efforts at improved diet and weight loss        Obstructive sleep apnea (adult) (pediatric)Glad she is having more success with her CPAP    LABORATORY:  Labs ordered to be performed today include CBC W/O DIFF.            Orders:       11525  BDCB2 - [facility] CBC w/o diff  (Send-Out)              Paroxysmal atrial fibrillationContinue current medications follow-up with cardiology as directed    LABORATORY:  Labs ordered to be performed today include Comprehensive  metabolic panel.            Orders:       [id number]  COMP - [facility] Comp. Metabolic Panel  (Send-Out)              Other specified disorders of bone density and structure, unspecified site    LABORATORY:  Labs ordered to be performed today include Vitamin D.            Orders:       18078  VITD - [facility] Vitamin D, 25 Hydroxy  (Send-Out)              Spinal stenosis, site unspecifiedShe did get off of the Norco but continues on the gabapentin.    Controlled substance documentation: Antonio reviewed; drug screen performed and appropriate; consent is reviewed and signed and on the chart.  She is aware of risk of addiction on this medication, understands that she will need to follow up for a review every 3 months and her medications will be adjusted or decreased as deemed appropriate at each visit.  No history of drug or alcohol abuse.  No concerns about diversion or abuse. She denies side effects related to the medication.  She is aware that she may be called in for pill counts.  The dosing of this medication will be reviewed on a regular basis and reduced if possible..  Ongoing use of a controlled substance is necessary for this patient to have a normal quality of life     Drug screen Controlled Substance Contract has been ordered           Orders:       [id number]  Drug test prsmv qual dir optical obs per day  (In-House)                  Other Patient Education Handouts:     Dragon transcription disclaimer:        Much of this encounter note is an electronic transcription/translation of spoken language to printed text.  The electronic translation of spoken language may permit erroneous, or at times, nonsensical words or phrases to be inadvertently transcribed.  Although I have reviewed the note for such errors, some may still exist.        Charge Capture:         Primary Diagnosis:     Z00.00  Encounter for general adult medical examination without abnormal findings           Orders:      48054  Preventive medicine, established  patient, age 65+ years  (In-House)              Subsequent Annual Well Visit Medicare  (x1)          Z13.31  Encounter for screening for depression           Orders:      11876-78  Office/outpatient visit; established patient, level 4  (In-House)              Depression screen negative  (In-House)              M15.0  Primary generalized (osteo)arthritis     E89.0  Postprocedural hypothyroidism     E66.01  Morbid (severe) obesity due to excess calories     G47.33  Obstructive sleep apnea (adult) (pediatric)     I48.0  Paroxysmal atrial fibrillation     M85.80  Other specified disorders of bone density and structure, unspecified site     M48.00  Spinal stenosis, site unspecified           Orders:      [id number]  Drug test prsmv qual dir optical obs per day  (In-House)

## 2021-05-18 NOTE — PROGRESS NOTES
Radha Arana  1940     Office/Outpatient Visit    Visit Date: Wed, Nov 27, 2019 01:33 pm    Provider: Susan Le N.P. (Assistant: Spurling, Sarah C, MA)    Location: [facility]        Electronically signed by Susan Le N.P. on  11/30/2019 09:43:17 PM                             Subjective:        CC: Shayy is a 79 year old White female.  Pt has a productive cough, she has had it for a couple of weeks, and she said that she is hurting down in her chest.;         HPI:           Shayy presents with cough.  It has been present for the past 2 weeks.  Respiratory symptoms include chest congestion and cough.   She denies sinus pressure or shortness of breath.  Other symptoms include chest congestion and wheezing.  She denies ear complaints, fever, headache or heartburn.  Sputum is described as scant and white.  She denies exposure to ill contacts.  She has not tried any medications for symptomatic relief.  Medical history is significant for Oxygen dependent, COPD, prior pneumonia.      ROS:     CONSTITUTIONAL:  Negative for fever.      CARDIOVASCULAR:  Negative for chest pain, orthopnea, paroxysmal nocturnal dyspnea and pedal edema.      RESPIRATORY:  Positive for recent cough, pleuritic chest pain and frequent wheezing.   Negative for dyspnea.      NEUROLOGICAL:  Negative for dizziness, headaches, paresthesias, and weakness.          Past Medical History / Family History / Social History:         Last Reviewed on 11/05/2019 12:31 PM by Rell Harmon    Past Medical History:                 PAST MEDICAL HISTORY         Hyperlipidemia    Hypertension     Asthma: mild severity; as child, still sometimes needs albuterol;     Sleep Apnea: uses CPAP;     Gastroesophageal Reflux Disease     Renal Stones     Chronic Pain: affecting the low back;     Hypothyroidism    goiter     Epidural Steroid injection x 2 , 2007    bilateral sacrioiliac injection 10/2010     Depression      Obesity: morbid;     optic neuritis with depth perception dysfunction         GYNECOLOGICAL HISTORY:             CURRENT MEDICAL PROVIDERS:    Cardiologist: Dr. Sheppard    E/N/T: Shy (2016 is only visit)    Ophthalmologist: Dr. Fink    Orthopedist: Abigail    Pulmonologist: Candy (not been seen since )    Urologist: Dimitrios             ADVANCE DIRECTIVES: Living will at home         PREVENTIVE HEALTH MAINTENANCE             BONE DENSITY: was last done 2017 with normal results     COLORECTAL CANCER SCREENING: declines colorectal cancer screening, understands reason for testing; defers/declines     EYE EXAM: was last done 18 The next one is due  Has on next month as well, goes every 6 months.      MAMMOGRAM: was last done 9/24/10 with normal results         PAST MEDICAL HISTORY         Positive for    Afib;     Hospitalizations:    Pneumonia last admit date 16         PAST MEDICAL HISTORY             ADVANCE DIRECTIVE Living will at home         Surgical History:         Cholecystectomy: at age 63;     Hysterectomy: at age 38's;  at age as child     Partial Thyroidectomy;    Carpal Tunnel ;     Positive for    bladder sling;;         Family History:     Father:  at age 68; Cause of death was MI     Mother: Cause of death was altzhiemers at 90     Brother(s): 3 brother(s) total; 1, at birth      Sister(s): 3 sister(s) total;  pacemaker;  Allergies ( one sister )         Social History: Honey Hernandez is her daughter     Occupation: Retired (Prior occupation: [employer], )     Marital Status:          Tobacco/Alcohol/Supplements:     Last Reviewed on 2019 11:28 AM by Staci Spear    Tobacco: She has never smoked.          Alcohol:  Does not drink alcohol and never has.          Substance Abuse History:     Last Reviewed on 2017 08:52 AM by Rell Harmon        Mental Health History:     Last Reviewed on 2017 08:52 AM by Rell Harmon  Kiran        Communicable Diseases (eg STDs):     Last Reviewed on 9/06/2017 08:52 AM by Rell Harmon        Current Problems:     Last Reviewed on 11/05/2018 11:30 AM by Susan Le    Hypercholesterolemia    Essential hypertension    Mixed hyperlipidemia    Essential (primary) hypertension    Impaired glucose tolerance (oral)    Prediabetes    Low back pain    Generalized osteoarthritis, site unspecified    Abnormal mammogram, unspecified    Primary generalized (osteo)arthritis    GERD    Elevated fasting glucose    Low back pain    Patient visit for long term (current) use of other drugs    Hypercalcemia    Long term (current) use of anticoagulants    Hypothyroidism, postsurgical    Postprocedural hypothyroidism    Morbid (severe) obesity due to excess calories    Other intervertebral disc degeneration, lumbosacral region    Morbid obesity    Degeneration of lumbar disc    Use of high risk medications    Other high-risk medications    Cystocele, midline    Leukocytosis, unspecified    Tachycardia, unspecified    Bilateral primary osteoarthritis of knee    Obstructive sleep apnea (adult) (pediatric)    Sleep apnea    Osteoarthritis of knee    Hoarseness    Unspecified voice and resonance disorder    Opioid use, unspecified, uncomplicated    Tachycardia, unspecified    Paroxysmal atrial fibrillation    Paroxysmal atrial fibrillation    Nasal lesion    Nasal mucositis (ulcerative)    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    Neoplasm of uncertain behavior: parotid gland    Spinal stenosis, site unspecified    Thoracolumbar spinal stenosis, Unspecified    Follow-up examination    Encntr for f/u exam aft trtmt for cond oth than malig neoplm    Obstructive sleep apnea (adult) (pediatric)    Vitamin D deficiency, unspecified    Combined systolic and diastolic heart failure, chronic    Vitamin D deficiency, unspecified    Chronic combined systolic (congestive) and diastolic  (congestive) heart failure    Overweight    Screening for depression    Encounter for screening for other disorder    Post-menopausal state        Immunizations:     VAQTA -adult dose (HepA) 2/14/2019    Prevnar 13 (Pneumococcal PCV 13) 3/26/2015    Fluvirin (4 + years dose) 9/22/2014    Fluzone (3 + years dose) 9/27/2011    Fluzone (3 + years dose) 11/13/2012    Fluzone High-Dose pf (>=65 yr) 10/16/2013    Fluzone High-Dose pf (>=65 yr) 9/21/2015    Fluzone High-Dose pf (>=65 yr) 9/21/2016    Fluzone High-Dose pf (>=65 yr) 10/2/2017    Fluzone High-Dose pf (>=65 yr) 10/1/2018    Fluzone High-Dose pf (>=65 yr) 10/1/2019    Pneumococcal, 23-valent IM/SC (adult and pt >=2yr) 2/23/2011    Boostrix (Tdap) 4/17/2015    Zostavax (Zoster live) 4/17/2015        Allergies:     Last Reviewed on 11/05/2019 11:28 AM by Staci Spear    Morphine:          Current Medications:     Last Reviewed on 11/05/2019 11:28 AM by Staci Spear    Ventolin HFA 90 mcg/actuation Inhalation HFA Aerosol Inhaler [inhale 2 puffs (180 mcg) by inhalation route every 4-6 hours as needed]    Multivitamin/Mineral Supplement     omeprazole 20 mg oral capsule,delayed release (enteric coated) [TAKE 1 CAPSULE BY MOUTH EVERY DAY]    levothyroxine 25 mcg oral tablet [TAKE 1 TABLET(S) BY MOUTH DAILY]    gabapentin 300 mg oral capsule [TAKE 1 CAPSULE BY MOUTH TWICE DAILY]    Oxybutynin Chloride 15 mg oral Tablet, Extended Release 24 hr [Take 1 tablet(s) by mouth daily]    Eliquis 5 mg oral tablet [Take 1 tablet(s) by mouth bid]    Eliquis 5 mg oral tablet [Take 1 tablet(s) by mouth bid]    Metoprolol Succinate 50 mg oral Tablet, Extended Release 24 hr [Take 1 tablet(s) by mouth daily]    Colace 100 mg oral capsule [1 QOD]    DULoxetine 60 mg oral capsule,delayed release (enteric coated) [Take 1 capsule(s) by mouth bid]    Furosemide 40 mg oral tablet [1 by mouth  daily]    Potassium Chloride 20 mEq oral Tablet, Extended Release Particles/Crystals [1 tab bid  "]    ipratropium-albuterol 0.5 mg-3 mg(2.5 mg base)/3 mL Inhalation Solution for Nebulization [Use 1 vial(s) by nebulizer qid]    Probiotic one tablet po bid      Melatonin 5 mg oral tablet [take two tablets qhs]    HYDROcodone-acetaminophen  mg oral tablet [Take 1 tablet(s) by mouth tid prn]        Objective:        Vitals:         Current: 11/27/2019 1:43:18 PM    Ht:  5 ft, 1.5 in;  Wt: 198 lbs;  BMI: 36.8T: 99.9 F (oral);  BP: 102/63 mm Hg (right arm, sitting);  P: 96 bpm (right arm (BP Cuff), sitting);  sCr: 0.9 mg/dL;  GFR: 53.81        Exams:     PHYSICAL EXAM:     GENERAL: vital signs recorded - well developed, well nourished;  no apparent distress;     RESPIRATORY: normal respiratory rate and pattern with no distress; decreased breath sounds throughout; rales (\"crackles\") present in the bases;     CARDIOVASCULAR: normal rate; rhythm is regular;  no systolic murmur; no edema;     GASTROINTESTINAL: nontender; normal bowel sounds; no organomegaly;     NEUROLOGICAL:  cranial nerves, motor and sensory function, reflexes, gait and coordination are all intact;     PSYCHIATRIC:  appropriate affect and demeanor; normal speech pattern; grossly normal memory;         Lab/Test Results:         Influenza A and B: Negative (11/27/2019),     Performed by:: lynne (11/27/2019),             Assessment:         R05   Cough       J20   Acute bronchitis           ORDERS:         Meds Prescribed:       [New Rx] cefdinir 300 mg oral capsule [take 1 capsule (300 mg) by oral route every 12 hours], #14 (fourteen) capsules, Refills: 0 (zero)         Radiology/Test Orders:       [id number]  Radiologic exam chest 2 views  (Send-Out; Stat)              Lab Orders:       [id number]  Infectious agent antigen detection by immunoassay; Influenza  (In-House)            91250-64  Infectious agent antigen detection by immunoassay; Influenza  (In-House)                      Plan:         Cough    LABORATORY:  Labs ordered to be performed today " include Flu A&B Flu A Flu B.            Prescriptions:       [New Rx] cefdinir 300 mg oral capsule [take 1 capsule (300 mg) by oral route every 12 hours], #14 (fourteen) capsules, Refills: 0 (zero)           Orders:       [id number]  Infectious agent antigen detection by immunoassay; Influenza  (In-House)            88108-62  Infectious agent antigen detection by immunoassay; Influenza  (In-House)              Acute bronchitis        RADIOLOGY:  I have ordered a chest x-ray (PA and lateral) to be done today.            Orders:       [id number]  Radiologic exam chest 2 views  (Send-Out; Stat)                  Charge Capture:         Primary Diagnosis:     R05  Cough           Orders:      [id number]  Office/outpatient visit; established patient, level 3  (In-House)            [id number]  Infectious agent antigen detection by immunoassay; Influenza  (In-House)            08578-13  Infectious agent antigen detection by immunoassay; Influenza  (In-House)              J20  Acute bronchitis         ADDENDUMS:      ____________________________________    Addendum: 12/03/2019 12:52 PM - Susan Le        J20  Acute bronchitis = missing digit        Add: J20.9

## 2021-05-18 NOTE — PROGRESS NOTES
Radha Arana 1940     Office/Outpatient Visit    Visit Date: Thu, Mar 15, 2018 09:00 am    Provider: Rell Harmon MD (Assistant: Tarah Duarte)    Location: [facility]        Electronically signed by Rell Harmon MD on  03/18/2018 02:42:51 PM                             SUBJECTIVE:        CC:     Shayy is a 78 year old White female.  This is a follow-up visit.  6 month check up;         HPI:         Shayy presents with hypercholesterolemia.  Current treatment includes no longer on medication..  We reviewed the lab and discussed pros/cons of getting back on something for her cholesterol.  In the end decided not to get back on anything because she feels better and her advanced age diminishes the benefit potential anyway.     The one thing that she is most concerned about at the present is issues she is having with incontinence of urine.  Says that she can't get out of bed without wetting on herself.  I tell her that her best chance is probably to see someone who can do Urodynamic testing.  She has seen UroGyn in the past and is willing to return again.     As far as her hx of afib, she is not aware of any palpitations.. She is on Eliquis and tolerating well..       She continues to have issues with chronic back pain related to her degenerative spinal disease which is compounded by her obesity. We also discussed the increasing evidence that chronic opioid pain medications may not be any better than using tylenol+nsaid alone.  She is hesitant to adopt the information, but understands that I am going to move in that direction.  Suggested she might also want to check with pain management again to see if they have something to offer.         With regard to the hypothyroidism, postsurgical, she denies any related symptoms.  She reports no symptoms suggestive of adverse medication effect.      ROS:     CONSTITUTIONAL:  Negative for chills, fatigue, fever, and weight change.      EYES:  Negative  for blurred vision.      CARDIOVASCULAR:  Negative for chest pain, orthopnea, paroxysmal nocturnal dyspnea and pedal edema.      RESPIRATORY:  Negative for dyspnea.      GASTROINTESTINAL:  Negative for abdominal pain, constipation, diarrhea, nausea and vomiting.      GENITOURINARY:  Positive for frequent urination.   Negative for dysuria.      NEUROLOGICAL:  Negative for dizziness, headaches, paresthesias, and weakness.      PSYCHIATRIC:  Negative for anxiety, depression, and sleep disturbances.          PMH/FMH/SH:     Last Reviewed on 2017 08:52 AM by Rell Harmon    Past Medical History:                 PAST MEDICAL HISTORY         Hyperlipidemia    Hypertension     Asthma: mild severity; as child, still sometimes needs albuterol;     Sleep Apnea: uses CPAP;     Gastroesophageal Reflux Disease     Renal Stones     Chronic Pain: affecting the low back;     Hypothyroidism    goiter     Epidural Steroid injection x  ,     bilateral sacrioiliac injection 10/2010     Depression     Obesity: morbid;     optic neuritis with depth perception dysfunction         GYNECOLOGICAL HISTORY:             CURRENT MEDICAL PROVIDERS:    Cardiologist: Dr. Sheppard    E/N/T: Shy (2016 is only visit)    Ophthalmologist: Dr. Fink    Orthopedist: Abigail    Pulmonologist: Candy (not been seen since )             ADVANCE DIRECTIVES: Living will at home         PREVENTIVE HEALTH MAINTENANCE             BONE DENSITY: was last done 2017 with normal results     COLORECTAL CANCER SCREENING: declines colorectal cancer screening, understands reason for testing; defers/declines     MAMMOGRAM: was last done 9/24/10         PAST MEDICAL HISTORY         Hospitalizations:    Pneumonia last admit date 16         PAST MEDICAL HISTORY             ADVANCE DIRECTIVE Living will at home         Surgical History:         Cholecystectomy: at age 63;     Hysterectomy: at age 38's;  at age as child      Partial  Thyroidectomy;     Positive for    bladder sling;;         Family History:     Father:  at age 68; Cause of death was MI     Mother: Cause of death was altzhiemers at 90     Brother(s): 3 brother(s) total; 1, at birth      Sister(s): 3 sister(s) total;  pacemaker;  Allergies ( one sister )         Social History: Honey Hernandez is her daughter     Occupation: Retired (Prior occupation: [employer], )     Marital Status:          Tobacco/Alcohol/Supplements:     Last Reviewed on 2018 12:29 PM by Katrina Gutierrez    Tobacco: She has never smoked.          Alcohol:  Does not drink alcohol and never has.          Substance Abuse History:     Last Reviewed on 2017 08:52 AM by Rell Harmon        Mental Health History:     Last Reviewed on 2017 08:52 AM by Rell Harmon        Communicable Diseases (eg STDs):     Last Reviewed on 2017 08:52 AM by Rell Harmon            Allergies:     Last Reviewed on 2018 12:29 PM by Katrina Gutierrez    Morphine:        Current Medications:     Last Reviewed on 2018 12:32 PM by Katrina Gutierrez    Hydrocodone/Acetaminophen 10mg/325mg Tablet 1 QID PRN     Omeprazole 20mg Capsules, Extended Release one a day     Neurontin 300mg Capsules Take 1 capsule(s) by mouth bid     Synthroid 0.025mg Tablet Take 1 tablet(s) by mouth daily     Eliquis 5mg Tablet Take 1 tablet(s) by mouth bid     Metoprolol Succinate 50mg Tablets, Extended Release Take 1 tablet(s) by mouth daily     magnesium 250mg  1tab daily     Vitamin B12 1,000mcg Tablet 1 bid     Tolterodine 4mg Capsules, Extended Release 1 capsule daily     Multivitamin/Mineral Supplement     Albuterol 90mcg/1actuation Oral Inhaler 1 puff  PRN     Calcium Carbonate 500mg Chewable Tablet Chew 1 tablet(s) by mouth bid     Glucosamine/Chondroitin 750mg/600mg Tablet Take 2 tablet(s) by mouth daily     Ocuvite eye vitamin daily     Vitamin D3 2,000IU Capsules 2 capsules  daily     Vitamin C 100mg Tablet 1 tab bid         OBJECTIVE:        Vitals:         Current: 3/15/2018 9:05:30 AM    Ht:  5 ft, 1.5 in;  Wt: 221.5 lbs;  BMI: 41.2    T: 96.8 F (oral);  BP: 132/72 mm Hg (left arm, sitting);  P: 95 bpm (finger clip, sitting);  sCr: 0.72 mg/dL;  GFR: 71.70        Exams:     PHYSICAL EXAM:     GENERAL: morbidly obese;  well groomed;  no apparent distress;     EYES: nonicteric;     E/N/T:  normal EACs, TMs, nasal/oral mucosa, teeth, gingiva, and oropharynx;     NECK: range of motion is normal; thyroid exam reveals scar from surgery;  carotid exam is normal with good upstroke and no bruits;     RESPIRATORY: normal appearance and symmetric expansion of chest wall; normal respiratory rate and pattern with no distress; normal breath sounds with no rales, rhonchi, wheezes or rubs;     CARDIOVASCULAR: normal rate; rhythm is regular;  no systolic murmur;     GASTROINTESTINAL: nontender; normal bowel sounds; no organomegaly; no masses;     GENITOURINARY: no CVAT;     LYMPHATIC: no enlargement of cervical or facial nodes; no supraclavicular nodes;     MUSCULOSKELETAL: globular knees     NEUROLOGIC: no lateralizing deficits.;     PSYCHIATRIC:  appropriate affect and demeanor; normal speech pattern; grossly normal memory;         ASSESSMENT           272.0   E78.2  Hypercholesterolemia              DDx:     788.39   N39.498  Incontinence of urine, other              DDx:     427.31   I48.0  Paroxysmal atrial fibrillation              DDx:     278.01   E66.01  Morbid obesity              DDx:     722.52   M51.37  Degeneration of lumbar disc              DDx:     715.00   M15.0  Generalized osteoarthritis, site unspecified              DDx:     244.0   E89.0  Hypothyroidism, postsurgical              DDx:         ORDERS:         Procedures Ordered:       REFER  Referral to Specialist or Other Facility  (Send-Out)         REFER  Referral to Specialist or Other Facility  (Send-Out)                    PLAN:          Hypercholesterolemia will stay off of the statin for now.          Incontinence of urine, other Will get her back in to follow up with Dr Plunkett, the urogynecologist she saw before.         REFERRALS:  Referral initiated to a urologist ( Dr. Kailey Plunkett (Uro-Gyne) ).            Orders:       REFER  Referral to Specialist or Other Facility  (Send-Out)            Paroxysmal atrial fibrillation continue rx          Morbid obesity she is reminded that weight loss would help with her pain and improve her functional ability.          Degeneration of lumbar disc We are going to start to wean her Norco.  Will replace one daily dose with Tylenol 500mg + Advil 200 mg.  Each visit we will wean one additional dose until we get her off of the Norco.  She also wants to get back over to pain management and see if an Epidural may be of additional  benefit. Which of course means she will need to get MRI         REFERRALS:  Referral initiated to Pain Management at [facility].            Orders:       REFER  Referral to Specialist or Other Facility  (Send-Out)            Generalized osteoarthritis, site unspecified as above          Hypothyroidism, postsurgical cont rx           Patient Education Handouts:       [facility]              Patient Recommendations:        For  Degeneration of lumbar disc:     I also recommend Pain Management at [facility].              CHARGE CAPTURE           **Please note: ICD descriptions below are intended for billing purposes only and may not represent clinical diagnoses**        Primary Diagnosis:         272.0 Hypercholesterolemia            E78.2    Mixed hyperlipidemia              Orders:          [id number]   Office/outpatient visit; established patient, level 4  (In-House)           788.39 Incontinence of urine, other            N39.498    Other specified urinary incontinence    427.31 Paroxysmal atrial fibrillation            I48.0    Paroxysmal atrial fibrillation    278.01  Morbid obesity            E66.01    Morbid (severe) obesity due to excess calories    722.52 Degeneration of lumbar disc            M51.37    Other intervertebral disc degeneration, lumbosacral region    715.00 Generalized osteoarthritis, site unspecified            M15.0    Primary generalized (osteo)arthritis    244.0 Hypothyroidism, postsurgical            E89.0    Postprocedural hypothyroidism

## 2021-05-18 NOTE — PROGRESS NOTES
Radha Arana 1940     Office/Outpatient Visit    Visit Date: Mon, Dec 10, 2018 08:52 am    Provider: Rell Harmon MD (Assistant: Naima Lara MA)    Location: [facility]        Electronically signed by Rell Harmon MD on  12/15/2018 09:32:45 PM                             SUBJECTIVE:        CC:     Shayy is a 78 year old White female.  This is a follow-up visit.          HPI:         Shayy presents with essential hypertension.  She did not bring her blood pressure diary, but says that pressures have been okay.  She is tolerating the medication well without side effects.  Compliance with treatment has been good.      We reviewed her weight and she is down a couple lbs from last visit, but still up 10 lbs from a year ago. She says she is trying to pay attention to her weight.         Concerning hypothyroidism, postsurgical, she denies any related symptoms.  She reports no symptoms suggestive of adverse medication effect.      Has PAF and no palpitations.  She sees cardiology sometime later this month. She is on Eliquis and tolerating okay.  Takes meloxicam from Ortho, but I told her that with her on the Eliquis I would not recommend she stay on the medication.  Her OA is about the same.  She gets shots in the knee and that helps.  Uses rollator for ambulation.     I asked her about the hallucinations she reported to Kalia at her last visit.  She says that she isn't having those any more.  She thinks it was related to some medication she received from the urgent care place next to [facility] ([facility]).  She doesn't know what the medication was or what it was for.  She says that the hallucinations were right after that visit to [facility], and not near the time she saw Kalia.     She has hx of OA and chronic pain.  Is followed by Dr Ansari too, and he put her on Meloxicam which she says does seem to help, but I point out to her that her being on Eliquis makes it risky for her to use  NSAIDs     ROS:     CONSTITUTIONAL:  Positive for fatigue.      EYES:  Negative for blurred vision.      CARDIOVASCULAR:  Negative for chest pain, orthopnea, paroxysmal nocturnal dyspnea and pedal edema.      RESPIRATORY:  Negative for dyspnea.      GASTROINTESTINAL:  Negative for abdominal pain, constipation, diarrhea, nausea and vomiting.      GENITOURINARY:  Negative for dysuria and frequent urination.      NEUROLOGICAL:  Positive for dizziness ( if she stands up fast ).      PSYCHIATRIC:  Negative for anxiety, depression, and sleep disturbances.          PMH/FMH/SH:     Last Reviewed on 2018 09:33 AM by Rell Harmon    Past Medical History:                 PAST MEDICAL HISTORY         Hyperlipidemia    Hypertension     Asthma: mild severity; as child, still sometimes needs albuterol;     Sleep Apnea: uses CPAP;     Gastroesophageal Reflux Disease     Renal Stones     Chronic Pain: affecting the low back;     Hypothyroidism    goiter     Epidural Steroid injection x  ,     bilateral sacrioiliac injection 10/2010     Depression     Obesity: morbid;     optic neuritis with depth perception dysfunction         GYNECOLOGICAL HISTORY:             CURRENT MEDICAL PROVIDERS:    Cardiologist: Dr. Sheppard    E/N/T: Shy (2016 is only visit)    Ophthalmologist: Dr. Fink    Orthopedist: Abigail    Pulmonologist: Candy (not been seen since )    Urologist: Dimitrios             ADVANCE DIRECTIVES: Living will at home         PREVENTIVE HEALTH MAINTENANCE             BONE DENSITY: was last done 2017 with normal results     COLORECTAL CANCER SCREENING: declines colorectal cancer screening, understands reason for testing; defers/declines     EYE EXAM: was last done 18 The next one is due  Has on next month as well, goes every 6 months.      MAMMOGRAM: was last done 9/24/10 with normal results         PAST MEDICAL HISTORY         Positive for    Afib;     Hospitalizations:    Pneumonia last  admit date 16         PAST MEDICAL HISTORY             ADVANCE DIRECTIVE Living will at home         Surgical History:         Cholecystectomy: at age 63;     Hysterectomy: at age 38's;  at age as child      Partial Thyroidectomy;    Carpal Tunnel ;     Positive for    bladder sling;;         Family History:     Father:  at age 68; Cause of death was MI     Mother: Cause of death was altzhiemers at 90     Brother(s): 3 brother(s) total; 1, at birth      Sister(s): 3 sister(s) total;  pacemaker;  Allergies ( one sister )         Social History: Honey Hernandez is her daughter     Occupation: Retired (Prior occupation: [employer], )     Marital Status:          Tobacco/Alcohol/Supplements:     Last Reviewed on 2018 11:04 AM by Ekta Donnelly    Tobacco: She has never smoked.          Alcohol:  Does not drink alcohol and never has.          Substance Abuse History:     Last Reviewed on 2017 08:52 AM by Rell Harmon        Mental Health History:     Last Reviewed on 2017 08:52 AM by Rell Harmon        Communicable Diseases (eg STDs):     Last Reviewed on 2017 08:52 AM by Rell Harmon            Allergies:     Last Reviewed on 2018 11:04 AM by Ekta Donnelly    Morphine:        Current Medications:     Last Reviewed on 2018 11:18 AM by Susan Le    Neurontin 300mg Capsules Take 1 capsule(s) by mouth bid     Synthroid 0.025mg Tablet Take 1 tablet(s) by mouth daily     Omeprazole 20mg Capsules, Extended Release one a day     Duloxetine HCl 60mg Capsules, Delayed Release Take 1 capsule(s) by mouth bid     Eliquis 5mg Tablet Take 1 tablet(s) by mouth bid     Metoprolol Succinate 50mg Tablets, Extended Release Take 1 tablet(s) by mouth daily     magnesium 250mg  1tab daily     Vitamin B12 1,000mcg Tablet 1 bid     Tolterodine 4mg Capsules, Extended Release 1 capsule daily     Multivitamin/Mineral Supplement     Albuterol  90mcg/1actuation Oral Inhaler 1 puff  PRN     Hydrocodone/Acetaminophen 10mg/325mg Tablet Take 1 tablet(s) by mouth tid     Cetirizine HCl 10mg Tablet 1 tab daily     Oxybutynin Chloride 15mg Tablets, Extended Release Take 1 tablet(s) by mouth daily     Meloxicam 15mg Tablet 1 tab daily     Calcium Carbonate 500mg Chewable Tablet Chew 1 tablet(s) by mouth bid     Glucosamine/Chondroitin 750mg/600mg Tablet Take 2 tablet(s) by mouth daily     Ocuvite eye vitamin daily     Vitamin D3 2,000IU Capsules 2 capsules daily     Vitamin C 100mg Tablet 1 tab bid         OBJECTIVE:        Vitals:         Current: 12/10/2018 9:01:33 AM    Ht:  5 ft, 1.5 in;  Wt: 235.8 lbs;  BMI: 43.8    T: 97.8 F (oral);  BP: 128/78 mm Hg (right arm, sitting);  P: 81 bpm (right arm (BP Cuff), sitting);  sCr: 0.86 mg/dL;  GFR: 61.64        Exams:     PHYSICAL EXAM:     GENERAL: morbidly obese;  well groomed;  no apparent distress;     EYES: nonicteric;     E/N/T:  normal EACs, TMs, nasal/oral mucosa, teeth, gingiva, and oropharynx;     NECK: range of motion is normal; thyroid exam reveals scar from surgery;  carotid exam is normal with good upstroke and no bruits;     RESPIRATORY: normal appearance and symmetric expansion of chest wall; normal respiratory rate and pattern with no distress; normal breath sounds with no rales, rhonchi, wheezes or rubs;     CARDIOVASCULAR: normal rate; rhythm is regular;  no systolic murmur;     GASTROINTESTINAL: nontender; normal bowel sounds; no organomegaly; no masses;     LYMPHATIC: no enlargement of cervical or facial nodes; no supraclavicular nodes;     MUSCULOSKELETAL: globular knees, no pedal edema, wearing support stocking     NEUROLOGIC: no lateralizing deficits.;     PSYCHIATRIC:  appropriate affect and demeanor; normal speech pattern; grossly normal memory;         ASSESSMENT           401.1   I10  Essential hypertension              DDx:     278.01   E66.01  Morbid obesity              DDx:     244.0    E89.0  Hypothyroidism, postsurgical              DDx:     427.31   I48.0  Paroxysmal atrial fibrillation              DDx:     780.1   R44.3  Hallucinations              DDx:     715.00   M15.0  Generalized osteoarthritis, site unspecified              DDx:         ORDERS:         Lab Orders:       APPTO  Appointment need  (In-House)         FUTURE  Future order to be done at patients convenience  (Send-Out)         96721  HTNLP - [facility] CMP AND LIPID: [id number]  (Send-Out)         51216  TSH - [facility] TSH  (Send-Out)           Other Orders:         Calculated BMI above the upper parameter and a follow-up plan was documented in the medical record  (In-House)                   PLAN:          Essential hypertension cont rx         FOLLOW-UP TESTING #1: FOLLOW-UP LABORATORY:  Labs to be scheduled in the future include HTN/Lipid Panel: CMP, Lipid and TSH.   Patient to schedule in 4 months.            Orders:       FUTURE  Future order to be done at patients convenience  (Send-Out)         16532  HTN - [facility] CMP AND LIPID: [id number]  (Send-Out)         65788  TSH - [facility] TSH  (Send-Out)            Morbid obesity     MIPS     BMI Elevated - Follow-Up Plan: She was provided education on weight loss strategies     FOLLOW-UP: Schedule a follow-up visit in 4 months..            Orders:       APPTO  Appointment need  (In-House)           Calculated BMI above the upper parameter and a follow-up plan was documented in the medical record  (In-House)            Hypothyroidism, postsurgical cont rx          Paroxysmal atrial fibrillation cont Eliquis          Hallucinations will get records to see what medication may have resulted in the hallucinations          Generalized osteoarthritis, site unspecified I recommend she stop the Meloxicam due to Eliquis use. Continue on the Norco             Patient Recommendations:        For  Essential hypertension:             The following laboratory testing has been ordered: TSH  Schedule the above testing in 4 months.          For  Morbid obesity:     Schedule a follow-up visit in 4 months.                APPOINTMENT INFORMATION:        Monday Tuesday Wednesday Thursday Friday Saturday Sunday            Time:___________________AM  PM   Date:_____________________             CHARGE CAPTURE           **Please note: ICD descriptions below are intended for billing purposes only and may not represent clinical diagnoses**        Primary Diagnosis:         401.1 Essential hypertension            I10    Essential (primary) hypertension              Orders:          37590   Office/outpatient visit; established patient, level 4  (In-House)           278.01 Morbid obesity            E66.01    Morbid (severe) obesity due to excess calories              Orders:          APPTO   Appointment need  (In-House)                Calculated BMI above the upper parameter and a follow-up plan was documented in the medical record  (In-House)           244.0 Hypothyroidism, postsurgical            E89.0    Postprocedural hypothyroidism    427.31 Paroxysmal atrial fibrillation            I48.0    Paroxysmal atrial fibrillation    780.1 Hallucinations            R44.3    Hallucinations, unspecified    715.00 Generalized osteoarthritis, site unspecified            M15.0    Primary generalized (osteo)arthritis

## 2021-05-18 NOTE — PROGRESS NOTES
Radha Arana 1940     Office/Outpatient Visit    Visit Date: Mon, May 20, 2019 03:48 pm    Provider: Soo Rosado N.P. (Assistant: Ekta Donnelly MA)    Location: [facility]        Electronically signed by Soo Rosado N.P. on  2019 04:19:40 PM                             SUBJECTIVE:        CC:     Shayy is a 79 year old White female.  presents today due to complaints of dog scratch on left hand X 1 week         HPI: Shayy presents with c/o dog scratch to left wrist/arm area. Reports occurred one week ago. Has been applying Neosporin without much improvement. Redness getting worried. Thinks that it is infected. UTD Tdap.         ROS:     CONSTITUTIONAL:  Negative for chills and fever.      CARDIOVASCULAR:  Negative for chest pain and palpitations.      RESPIRATORY:  Negative for dyspnea and frequent wheezing.      INTEGUMENTARY/BREAST:  Positive for dog scratch to left wrist/arm.          PMH/FM/SH:     Last Reviewed on 12/10/2018 09:31 AM by Rell Harmon    Past Medical History:                 PAST MEDICAL HISTORY         Hyperlipidemia    Hypertension     Asthma: mild severity; as child, still sometimes needs albuterol;     Sleep Apnea: uses CPAP;     Gastroesophageal Reflux Disease     Renal Stones     Chronic Pain: affecting the low back;     Hypothyroidism    goiter     Epidural Steroid injection x 2007    bilateral sacrioiliac injection 10/2010     Depression     Obesity: morbid;     optic neuritis with depth perception dysfunction         GYNECOLOGICAL HISTORY:             CURRENT MEDICAL PROVIDERS:    Cardiologist: Dr. Sheppard    E/N/T: Shy (2016 is only visit)    Ophthalmologist: Dr. Fink    Orthopedist: Abigail    Pulmonologist: Candy (not been seen since )    Urologist: Dimitrios             ADVANCE DIRECTIVES: Living will at home         PREVENTIVE HEALTH MAINTENANCE             BONE DENSITY: was last done 2017 with normal results      COLORECTAL CANCER SCREENING: declines colorectal cancer screening, understands reason for testing; defers/declines     EYE EXAM: was last done 18 The next one is due  Has on next month as well, goes every 6 months.      MAMMOGRAM: was last done 9/24/10 with normal results         PAST MEDICAL HISTORY         Positive for    Afib;     Hospitalizations:    Pneumonia last admit date 16         PAST MEDICAL HISTORY             ADVANCE DIRECTIVE Living will at home         Surgical History:         Cholecystectomy: at age 63;     Hysterectomy: at age 38's;  at age as child      Partial Thyroidectomy;    Carpal Tunnel ;     Positive for    bladder sling;;         Family History:     Father:  at age 68; Cause of death was MI     Mother: Cause of death was altzhiemers at 90     Brother(s): 3 brother(s) total; 1, at birth      Sister(s): 3 sister(s) total;  pacemaker;  Allergies ( one sister )         Social History: Honey Hernandez is her daughter     Occupation: Retired (Prior occupation: [employer], )     Marital Status:          Tobacco/Alcohol/Supplements:     Last Reviewed on 2019 01:15 PM by Nithya Bishop    Tobacco: She has never smoked.          Alcohol:  Does not drink alcohol and never has.          Substance Abuse History:     Last Reviewed on 2017 08:52 AM by Rell Harmon        Mental Health History:     Last Reviewed on 2017 08:52 AM by Rell Harmon        Communicable Diseases (eg STDs):     Last Reviewed on 2017 08:52 AM by Rell Harmon            Current Problems:     Last Reviewed on 2018 11:30 AM by Susan Le    Vitamin D deficiency, unspecified     Obstructive sleep apnea (adult) (pediatric)     Combined systolic and diastolic heart failure, chronic     Use of high risk medications     Thoracolumbar spinal stenosis, Unspecified     Neoplasm of uncertain behavior: parotid gland     Nasal lesion      Osteoarthritis of knee     Paroxysmal atrial fibrillation     Tachycardia, unspecified     Hoarseness     Sleep apnea     Leukocytosis, unspecified     Cystocele, midline     Other high-risk medications     Morbid obesity     Degeneration of lumbar disc     Hypothyroidism, postsurgical     Essential hypertension     Low back pain     Hypercholesterolemia     Hypercalcemia     Patient visit for long term (current) use of other drugs     Elevated fasting glucose     GERD     Prediabetes     Generalized osteoarthritis, site unspecified     Abnormal mammogram, unspecified     Dyspepsia     Other dysphagia     Bacterial pneumonia, NEC     Follow-up examination         Immunizations:     VAQTA -adult dose (HepA) 2/14/2019     Prevnar 13 (Pneumococcal PCV 13) 3/26/2015     Fluvirin (4 + years dose) 9/22/2014     Fluzone (3 + years dose) 9/27/2011     Fluzone (3 + years dose) 11/13/2012     Fluzone High-Dose pf (>=65 yr) 10/16/2013     Fluzone High-Dose pf (>=65 yr) 9/21/2015     Fluzone High-Dose pf (>=65 yr) 9/21/2016     Fluzone High-Dose pf (>=65 yr) 10/2/2017     Fluzone High-Dose pf (>=65 yr) 10/1/2018     Pneumococcal, 23-valent IM/SC (adult and pt >=2yr) 2/23/2011     Boostrix (Tdap) 4/17/2015     Zostavax (Zoster live) 4/17/2015         Allergies:     Last Reviewed on 4/30/2019 01:15 PM by Nithya Bishop    Morphine:        Current Medications:     Last Reviewed on 4/30/2019 01:15 PM by Nithya Bishop    Furosemide 40mg Tablets 1 by mouth  daily     Potassium Chloride 20mEq Tablets, Extended Release 1 tab bid     Neurontin 300mg Capsules Take 1 capsule(s) by mouth bid     Omeprazole 20mg Capsules, Extended Release one a day     Synthroid 0.025mg Tablet Take 1 tablet(s) by mouth daily     Duloxetine HCl 60mg Capsules, Delayed Release Take 1 capsule(s) by mouth bid     Ipratropium Bromide/Albuterol 0.5mg/3mg per 3ml Nebulizer Solution every 4-6 hours as needed     Eliquis 5mg Tablet Take 1 tablet(s)  by mouth bid     Metoprolol Succinate 50mg Tablets, Extended Release Take 1 tablet(s) by mouth daily     Multivitamin/Mineral Supplement     Albuterol 90mcg/1actuation Oral Inhaler 1 puff  PRN     Colace 100mg Capsules 1 QOD     Melatonin 5mg Tablet take two tablets qhs     Oxybutynin Chloride 15mg Tablets, Extended Release Take 1 tablet(s) by mouth daily     Probiotic one tablet po bid     Ocuvite eye vitamin daily         OBJECTIVE:        Vitals:         Current: 5/20/2019 3:53:51 PM    Ht:  5 ft, 1.5 in;  Wt: 210.2 lbs;  BMI: 39.1    T: 97.7 F (oral);  BP: 88/55 mm Hg (right arm, sitting);  P: 111 bpm (right arm (BP Cuff), sitting);  sCr: 0.93 mg/dL;  GFR: 53.42        Repeat:     4:13:45 PM     BP:   110/60mm Hg        Exams:     PHYSICAL EXAM:     GENERAL: well developed, well nourished;  no apparent distress;     RESPIRATORY: normal respiratory rate and pattern with no distress; normal breath sounds with no rales, rhonchi, wheezes or rubs;     CARDIOVASCULAR: normal rate; rhythm is regular;     BREAST/INTEGUMENT: area to left wrist/forearm with linear scratch with darked tissues and surrounding area erythema;     NEUROLOGIC: mental status: alert and oriented x 3; GROSSLY INTACT     PSYCHIATRIC: appropriate affect and demeanor;         ASSESSMENT           686.9   L08.9  Skin infection              DDx:         ORDERS:         Meds Prescribed:       Augmentin (Amoxicillin/Clavulanate) 875mg/125mg Tablet Take 1 tablet(s) by mouth q12h for 10 days  #20 (Twenty) tablet(s) Refills: 0                 PLAN:          Skin infection         RECOMMENDATIONS given include: Keep clean and dry. Follow up for persistent or worsening symptoms..      FOLLOW-UP: Patient already scheduled for follow-up appointment with PCP           Prescriptions:       Augmentin (Amoxicillin/Clavulanate) 875mg/125mg Tablet Take 1 tablet(s) by mouth q12h for 10 days  #20 (Twenty) tablet(s) Refills: 0             CHARGE CAPTURE            **Please note: ICD descriptions below are intended for billing purposes only and may not represent clinical diagnoses**        Primary Diagnosis:         686.9 Skin infection            L08.9    Local infection of the skin and subcutaneous tissue, unspecified              Orders:          02537   Office/outpatient visit; established patient, level 3  (In-House)

## 2021-05-18 NOTE — PROGRESS NOTES
Radha Arana 1940     Office/Outpatient Visit    Visit Date: Tue, Nov 5, 2019 11:28 am    Provider: Rell Harmon MD (Assistant: Staci Spear MA)    Location: [facility]        Electronically signed by Rell Harmon MD on  11/08/2019 06:23:53 AM                             SUBJECTIVE:        CC:     Shayy is a 79 year old White female.  This is a follow-up visit.  medicare wellness; Accompanied by her daughter, Honey         HPI:         Patient to be evaluated for hypercholesterolemia.  Compliance with treatment has been good.  She specifically denies associated symptoms, including muscle pain and weakness.          Additionally, she presents with history of essential hypertension.  Shayy does not check her blood pressure other than at her clinic appointments.  She is tolerating the medication well without side effects.  Compliance with treatment has been good.          Shayy is here for a Medicare wellness visit.  ADVANCE DIRECTIVES (Please update in PMH): Living will on file         Returning to health checkup, self-Assessment of Health: She rates her health as good. She rates her confidence of being able to control/manage most of her health problems as very confident. Her physical/emotional health has limited her social activites quite a bit.  A review of possible cognitive impairment was performed and the following was noted: she is not having trouble driving;  not experiencing changes in memory;  she is not having trouble with her finances A review of functional ability, including bathing, dressing, walking, and urine/bowel continence as well as level of safety was performed and was found to be negative.  Falls Risk: Has not had any falls or only one fall without injury in the past year.  In regard to hearing, she reports having trouble hearing the TV/radio when others do not and having to strain to hear or understand conversations, but not wearing hearing aid(s).  Concerning  home safety, Physical Activity: She exercises but less than 20 minutes 3 days per week; Type of diet patient normally eats is described as well-balanced with fruits and vegetables Tobacco: She has never smoked.  Preventative Health updated today         PHQ-9 Depression Screening: Completed form scanned and in chart; Total Score 6     I did note that she has lost some weight.  Says she does not quite as hungry.  Is not having diarrhea.  Admits that she was a little bit down after the death of her daughter-in-law. Also did review note from her sleep specialist.  She did get a new facemask and is using the CPAP more successfully.  She has a follow-up appointment with the pulmonologist this afternoon.     Has a history of paroxysmal fibrillation.  Is followed by cardiology.  Currently anticoagulated with Eliquis.     Continues to have issues with chronic back pain.  She has known spinal stenosis.  Does get relief with use of Norco.  Allows her to release be ambulatory around the house. Review of the record shows he spent a couple years since she is had a bone density study.         In regard to the hypothyroidism, postsurgical, she denies any related symptoms.  She reports no symptoms suggestive of adverse medication effect.      ROS:     CONSTITUTIONAL:  Negative for chills, fatigue, fever, and weight change.      EYES:  Negative for blurred vision.      CARDIOVASCULAR:  Negative for chest pain, orthopnea, paroxysmal nocturnal dyspnea and pedal edema.      RESPIRATORY:  Positive for dyspnea.      GASTROINTESTINAL:  Negative for abdominal pain, constipation, diarrhea, nausea and vomiting.      GENITOURINARY:  Negative for dysuria and frequent urination.      NEUROLOGICAL:  Positive for headaches.      PSYCHIATRIC:  Positive for anxiety.          PMH/FMH/SH:     Last Reviewed on 11/05/2019 12:31 PM by Rell Harmon    Past Medical History:                 PAST MEDICAL HISTORY         Hyperlipidemia     Hypertension     Asthma: mild severity; as child, still sometimes needs albuterol;     Sleep Apnea: uses CPAP;     Gastroesophageal Reflux Disease     Renal Stones     Chronic Pain: affecting the low back;     Hypothyroidism    goiter     Epidural Steroid injection x  ,     bilateral sacrioiliac injection 10/2010     Depression     Obesity: morbid;     optic neuritis with depth perception dysfunction         GYNECOLOGICAL HISTORY:             CURRENT MEDICAL PROVIDERS:    Cardiologist: Dr. Sheppard    E/N/T: Shy (2016 is only visit)    Ophthalmologist: Dr. Fink    Orthopedist: Abigail    Pulmonologist: Candy (not been seen since )    Urologist: Dimitrios             ADVANCE DIRECTIVES: Living will at home         PREVENTIVE HEALTH MAINTENANCE             BONE DENSITY: was last done 2017 with normal results     COLORECTAL CANCER SCREENING: declines colorectal cancer screening, understands reason for testing; defers/declines     EYE EXAM: was last done 18 The next one is due  Has on next month as well, goes every 6 months.      MAMMOGRAM: was last done 9/24/10 with normal results         PAST MEDICAL HISTORY         Positive for    Afib;     Hospitalizations:    Pneumonia last admit date 16         PAST MEDICAL HISTORY             ADVANCE DIRECTIVE Living will at home         Surgical History:         Cholecystectomy: at age 63;     Hysterectomy: at age 38's;  at age as child      Partial Thyroidectomy;    Carpal Tunnel ;     Positive for    bladder sling;;         Family History:     Father:  at age 68; Cause of death was MI     Mother: Cause of death was altzhiemers at 90     Brother(s): 3 brother(s) total; 1, at birth      Sister(s): 3 sister(s) total;  pacemaker;  Allergies ( one sister )         Social History: Honey Hernandez is her daughter     Occupation: Retired (Prior occupation: [employer], )     Marital Status:           Tobacco/Alcohol/Supplements:     Last Reviewed on 11/05/2019 11:28 AM by Staci Spear    Tobacco: She has never smoked.          Alcohol:  Does not drink alcohol and never has.          Substance Abuse History:     Last Reviewed on 9/06/2017 08:52 AM by Rell Harmon        Mental Health History:     Last Reviewed on 9/06/2017 08:52 AM by Rell Harmon        Communicable Diseases (eg STDs):     Last Reviewed on 9/06/2017 08:52 AM by Rell Harmon            Allergies:     Last Reviewed on 11/05/2019 11:28 AM by Staci Spear    Morphine:        Current Medications:     Last Reviewed on 11/05/2019 11:28 AM by Staci Spear    Furosemide 40mg Tablets 1 by mouth  daily     Potassium Chloride 20mEq Tablets, Extended Release 1 tab bid     Duloxetine HCl 60mg Capsules, Delayed Release Take 1 capsule(s) by mouth bid     Neurontin 300mg Capsules Take 1 capsule(s) by mouth bid     Omeprazole 20mg Capsules, Extended Release one a day     Synthroid 0.025mg Tablet Take 1 tablet(s) by mouth daily     Ipratropium Bromide/Albuterol 0.5mg/3mg per 3ml Nebulizer Solution every 4-6 hours as needed     Eliquis 5mg Tablet Take 1 tablet(s) by mouth bid     Metoprolol Succinate 50mg Tablets, Extended Release Take 1 tablet(s) by mouth daily     Multivitamin/Mineral Supplement     Albuterol 90mcg/1actuation Oral Inhaler 1 puff  PRN     Hydrocodone/Acetaminophen 10mg/325mg Tablet Take 1 tablet(s) by mouth tid prn     Colace 100mg Capsules 1 QOD     Melatonin 5mg Tablet take two tablets qhs     Oxybutynin Chloride 15mg Tablets, Extended Release Take 1 tablet(s) by mouth daily     Probiotic one tablet po bid         OBJECTIVE:        Vitals:         Current: 11/5/2019 11:41:57 AM    Ht:  5 ft, 1.5 in;  Wt: 202.6 lbs;  BMI: 37.7    T: 99.1 F (oral);  BP: 121/102 mm Hg (left arm, sitting);  P: 80 bpm (left arm (BP Cuff), sitting);  sCr: 0.93 mg/dL;  GFR: 52.59    VA: 20/70 OD, counts fingers at 6 feet OS (with  correction)        Repeat:     11:44:30 AM     BP:   97/65mm Hg (left arm, sitting, P-100)         Exams:     PHYSICAL EXAM:     GENERAL: morbidly obese;  well groomed;  no apparent distress, Certainly improved and close to her usual state of health;     EYES: nonicteric;     E/N/T:  normal EACs, TMs, nasal/oral mucosa, teeth, gingiva, and oropharynx;     NECK: range of motion is normal; thyroid exam reveals scar from surgery;  carotid exam is normal with good upstroke and no bruits;     RESPIRATORY: normal appearance and symmetric expansion of chest wall; normal respiratory rate and pattern with no distress; Diminished airflow with a few crackles in the bases;     CARDIOVASCULAR: normal rate; rhythm is regular;  no systolic murmur;     GASTROINTESTINAL: nontender; normal bowel sounds; no organomegaly; no masses;     LYMPHATIC: no enlargement of cervical or facial nodes; no supraclavicular nodes;     MUSCULOSKELETAL: globular knees, 1-2 + pedal edema     NEUROLOGIC: no lateralizing deficits.;     PSYCHIATRIC: She does not appear depressed.  She expresses a pretty positive outlook today.; Still grieving some         ASSESSMENT           272.0   E78.2  Hypercholesterolemia              DDx:     401.1   I10  Essential hypertension              DDx:     V70.0   Z00.00  Health checkup              DDx:     V79.0   Z13.89  Screening for depression              DDx:     278.01   E66.01  Morbid obesity              DDx:     780.57   G47.33  Sleep apnea              DDx:     427.31   I48.0  Paroxysmal atrial fibrillation              DDx:     724.00   M48.00  Thoracolumbar spinal stenosis, Unspecified              DDx:     428.42   I50.42  Combined systolic and diastolic heart failure, chronic              DDx:     244.0   E89.0  Hypothyroidism, postsurgical              DDx:     627.2   E66.3  Post-menopausal state              DDx:         ORDERS:         Radiology/Test Orders:       85821  DXA, bone density study, 1 or  more sites; axial skeleton (eg hips, pelvis, spine)  (Send-Out)           Lab Orders:       [id number]  HTNLP - [facility] CMP AND LIPID: [id number], [id number]  (Send-Out)         34751  BDCBC - [facility] CBC with 3 part diff  (Send-Out)         93407  TSH - [facility] TSH  (Send-Out)           Other Orders:       1101F  Pt screen for fall risk; document no falls in past year or only 1 fall w/o injury in past year (MIAN)  (In-House)           Depression screen positive and follow up plan documented  (In-House)           Subsequent Annual Well Visit Medicare (x1)                 PLAN:          Hypercholesterolemia     LABORATORY:  Labs ordered to be performed today include HTN/Lipid Panel: CMP, Lipid.            Orders:       54341  HTNLP - [facility] CMP AND LIPID: [id number], [id number]  (Send-Out)            Essential hypertension     LABORATORY:  Labs ordered to be performed today include CBC.            Orders:       [id number]  [facility] - [facility] CBC with 3 part diff  (Send-Out)            Health checkup We reviewed the preventive service recommendations and created an individualized handout     MIPS Has had no falls or only one fall without injury in the past year           Orders:       1101F  Pt screen for fall risk; document no falls in past year or only 1 fall w/o injury in past year (MIAN)  (In-House)            Screening for depression     MIPS PHQ-9 Depression Screening: Completed form scanned and in chart; Total Score 6 Positive Depression Screen: Stable on medications. No suicidal ideation.            Orders:         Depression screen positive and follow up plan documented  (In-House)            Morbid obesity Pointed out her weight loss and acknowledge that it is probably good for her, however, also stated that if she is losing weight without effort that could be a sign of concern so she needs to keep an eye on that.          Sleep apnea Follow-up with sleep specialist as directed          Paroxysmal atrial fibrillation Stay on medication follow-up  with cardiology as directed          Combined systolic and diastolic heart failure, chronic Seems to have improved significantly from her hospitalization.  Fairly well compensated now. Follow-up with cardiology as directed          Hypothyroidism, postsurgical     LABORATORY:  Labs ordered to be performed today include TSH.            Orders:       23638  TSH - [facility] TSH  (Send-Out)            Post-menopausal state         RADIOLOGY:  I have ordered Dexa Scan to be done today.            Orders:       [id number]  DXA, bone density study, 1 or more sites; axial skeleton (eg hips, pelvis, spine)  (Send-Out)               Patient Recommendations:    Dragon transcription disclaimer:        Much of this encounter note is an electronic transcription/translation of spoken language to printed text.  The electronic translation of spoken language may permit erroneous, or at times, nonsensical words or phrases to be inadvertently transcribed.  Although I have reviewed the note for such errors, some may still exist.             CHARGE CAPTURE           **Please note: ICD descriptions below are intended for billing purposes only and may not represent clinical diagnoses**        Primary Diagnosis:         272.0 Hypercholesterolemia            E78.2    Mixed hyperlipidemia              Orders:          08650 -25  Office/outpatient visit; established patient, level 4  (In-House)           401.1 Essential hypertension            I10    Essential (primary) hypertension    V70.0 Health checkup            Z00.00    Encounter for general adult medical examination without abnormal findings              Orders:          19994   Preventive medicine, established patient, age 65+ years  (In-House)                                           Subsequent Annual Well Visit Medicare (x1)           1101F   Pt screen for fall risk; document no falls in past year or only 1 fall w/o injury in past year (MIAN)  (In-House)           V79.0 Screening for  depression            Z13.89    Encounter for screening for other disorder              Orders:             Depression screen positive and follow up plan documented  (In-House)           278.01 Morbid obesity            E66.01    Morbid (severe) obesity due to excess calories    780.57 Sleep apnea            G47.33    Obstructive sleep apnea (adult) (pediatric)    427.31 Paroxysmal atrial fibrillation            I48.0    Paroxysmal atrial fibrillation    724.00 Thoracolumbar spinal stenosis, Unspecified            M48.00    Spinal stenosis, site unspecified    428.42 Combined systolic and diastolic heart failure, chronic            I50.42    Chronic combined systolic (congestive) and diastolic (congestive) heart failure    244.0 Hypothyroidism, postsurgical            E89.0    Postprocedural hypothyroidism    627.2 Post-menopausal state            E66.3    Overweight

## 2021-05-18 NOTE — PROGRESS NOTES
Radha Arana 1940     Office/Outpatient Visit    Visit Date: Fri, Jun 22, 2018 12:31 pm    Provider: Rell Harmon MD (Assistant: Rubia Chapman MA)    Location: [facility]        Electronically signed by Rell Harmon MD on  06/24/2018 11:26:17 PM                             SUBJECTIVE:        CC:     Shayy is a 78 year old White female.  This is a follow-up visit.          HPI:         Patient to be evaluated for essential hypertension.  She did not bring her blood pressure diary, but says that pressures have been okay.  She is tolerating the medication well without side effects.  Compliance with treatment has been good.      She has had some elevation in cholesterol, but is not currently on any medication out of her concerns of aches and pains related to the medication.     She continues to complain of back pain related to her DDD and spinal stenosis.  She did see pain management. but elected to continue current pain meds at this point and keep Epidural as option for future.     She does say that Dr Ansari dx her with bilat carpal tunnel, and is planning on doing surgery soon.      ROS:     CONSTITUTIONAL:  Negative for chills, fatigue, fever, and weight change.      EYES:  Negative for blurred vision.      CARDIOVASCULAR:  Negative for chest pain, orthopnea, paroxysmal nocturnal dyspnea and pedal edema.      RESPIRATORY:  Positive for dyspnea ( with mild exertion ).      GASTROINTESTINAL:  Negative for abdominal pain, constipation, diarrhea, nausea and vomiting.      GENITOURINARY:  Negative for dysuria and frequent urination.      NEUROLOGICAL:  Negative for dizziness, headaches, paresthesias, and weakness.      PSYCHIATRIC:  Negative for anxiety, depression, and sleep disturbances.          PMH/FM/SH:     Last Reviewed on 9/06/2017 08:52 AM by Rell Harmon    Past Medical History:                 PAST MEDICAL HISTORY         Hyperlipidemia    Hypertension     Asthma: mild  severity; as child, still sometimes needs albuterol;     Sleep Apnea: uses CPAP;     Gastroesophageal Reflux Disease     Renal Stones     Chronic Pain: affecting the low back;     Hypothyroidism    goiter     Epidural Steroid injection x 2 ,     bilateral sacrioiliac injection 10/2010     Depression     Obesity: morbid;     optic neuritis with depth perception dysfunction         GYNECOLOGICAL HISTORY:             CURRENT MEDICAL PROVIDERS:    Cardiologist: Dr. Sheppard    E/N/T: Shy (2016 is only visit)    Ophthalmologist: Dr. Fink    Orthopedist: Abigail    Pulmonologist: Candy (not been seen since )             ADVANCE DIRECTIVES: Living will at home         PREVENTIVE HEALTH MAINTENANCE             BONE DENSITY: was last done 2017 with normal results     COLORECTAL CANCER SCREENING: declines colorectal cancer screening, understands reason for testing; defers/declines     MAMMOGRAM: was last done 9/24/10         PAST MEDICAL HISTORY         Hospitalizations:    Pneumonia last admit date 16         PAST MEDICAL HISTORY             ADVANCE DIRECTIVE Living will at home         Surgical History:         Cholecystectomy: at age 63;     Hysterectomy: at age 38's;  at age as child      Partial Thyroidectomy;     Positive for    bladder sling;;         Family History:     Father:  at age 68; Cause of death was MI     Mother: Cause of death was altzhiemers at 90     Brother(s): 3 brother(s) total; 1, at birth      Sister(s): 3 sister(s) total;  pacemaker;  Allergies ( one sister )         Social History: Honey Hernandez is her daughter     Occupation: Retired (Prior occupation: [employer], )     Marital Status:          Tobacco/Alcohol/Supplements:     Last Reviewed on 2018 10:09 AM by Althea Ferreira    Tobacco: She has never smoked.          Alcohol:  Does not drink alcohol and never has.          Substance Abuse History:     Last Reviewed on 2017 08:52  AM by Rell Harmon        Mental Health History:     Last Reviewed on 9/06/2017 08:52 AM by Rell Harmon        Communicable Diseases (eg STDs):     Last Reviewed on 9/06/2017 08:52 AM by Rell Harmon            Allergies:     Last Reviewed on 6/22/2018 12:34 PM by Rubia Chapman    Morphine:        Current Medications:     Last Reviewed on 6/22/2018 12:36 PM by Rubia Chapman    Neurontin 300mg Capsules Take 1 capsule(s) by mouth bid     Omeprazole 20mg Capsules, Extended Release one a day     Synthroid 0.025mg Tablet Take 1 tablet(s) by mouth daily     Eliquis 5mg Tablet Take 1 tablet(s) by mouth bid     Metoprolol Succinate 50mg Tablets, Extended Release Take 1 tablet(s) by mouth daily     magnesium 250mg  1tab daily     Vitamin B12 1,000mcg Tablet 1 bid     Tolterodine 4mg Capsules, Extended Release 1 capsule daily     Multivitamin/Mineral Supplement     Albuterol 90mcg/1actuation Oral Inhaler 1 puff  PRN     Hydrocodone/Acetaminophen 10mg/325mg Tablet Take 1 tablet(s) by mouth tid     Calcium Carbonate 500mg Chewable Tablet Chew 1 tablet(s) by mouth bid     Glucosamine/Chondroitin 750mg/600mg Tablet Take 2 tablet(s) by mouth daily     Ocuvite eye vitamin daily     Vitamin D3 2,000IU Capsules 2 capsules daily     Vitamin C 100mg Tablet 1 tab bid     Meloxicam 15mg Tablet 1 tab daily         OBJECTIVE:        Vitals:         Current: 6/22/2018 12:33:56 PM    Ht:  5 ft, 1.5 in;  Wt: 228.8 lbs;  BMI: 42.5    T: 97.3 F (oral);  BP: 134/78 mm Hg (left arm, sitting);  P: 94 bpm (finger clip, sitting);  sCr: 0.72 mg/dL;  GFR: 72.69        Exams:     PHYSICAL EXAM:     GENERAL: morbidly obese;  well groomed;  no apparent distress;     EYES: nonicteric;     E/N/T:  normal EACs, TMs, nasal/oral mucosa, teeth, gingiva, and oropharynx;     NECK: range of motion is normal; thyroid exam reveals scar from surgery;  carotid exam is normal with good upstroke and no bruits;     RESPIRATORY: normal  appearance and symmetric expansion of chest wall; normal respiratory rate and pattern with no distress; normal breath sounds with no rales, rhonchi, wheezes or rubs;     CARDIOVASCULAR: normal rate; rhythm is regular;  no systolic murmur;     GASTROINTESTINAL: nontender; normal bowel sounds; no organomegaly; no masses;     LYMPHATIC: no enlargement of cervical or facial nodes; no supraclavicular nodes;     MUSCULOSKELETAL: globular knees, no pedal edema, wearing support stocking     NEUROLOGIC: no lateralizing deficits.;     PSYCHIATRIC:  appropriate affect and demeanor; normal speech pattern; grossly normal memory;         Lab/Test Results:             Amphetamines Screen, Urin:  Negative (06/22/2018),     BAR-Barbiturates Screen, Urin:  Negative (06/22/2018),     Buprenorphine:  Negative (06/22/2018),     BZO-Benzodiazepines Screen,Ur:  Negative (06/22/2018),     Cocaine(Metab.)Screen, Ur:  Negative (06/22/2018),     MDMA-Ecstasy:  Negative (06/22/2018),     Met-Methamphetamine:  Negative (06/22/2018),     MTD-Methadone Screen, Urine:  Negative (06/22/2018),     Opiate Screen, Urine:  Positive (06/22/2018),     OXY-Oxycodone:  Negative (06/22/2018),     PCP-Phencyclidine Screen, Uri:  Negative (06/22/2018),     THC Cannabinoids Screen, Urin:  Negative (06/22/2018),     Urine temperature:  confirmed (06/22/2018),     Date and time of last pill:  Hydrocodone 6/22/18@10am  Gabapentin 6/22/18@10am (06/22/2018),     Performed by:  tls (06/22/2018),     Collection Time:  1330 (06/22/2018),             ASSESSMENT           401.1   I10  Essential hypertension              DDx:     272.0   E78.2  Hypercholesterolemia              DDx:     722.52   M51.37  Degeneration of lumbar disc              DDx:     724.00   M48.00  Thoracolumbar spinal stenosis, Unspecified              DDx:     354.0   G56.03  Carpal tunnel syndrome              DDx:     V58.69   F11.90  Use of high risk medications              DDx:         ORDERS:          Lab Orders:       [id number]  Drug test prsmv qual dir optical obs per day  (In-House)                   PLAN: recommend Hep A          Essential hypertension cont rx          Hypercholesterolemia consider reintroduction of meds after her surgery.          Degeneration of lumbar disc keep Ortho/pain management follow up as directed.  Will continue Norco Rx for now.          Thoracolumbar spinal stenosis, Unspecified continue Rx. Consider Epidural          Carpal tunnel syndrome She intends to see Dr Ansari next week.          Use of high risk medications     Controlled substance documentation: Antonio reviewed; drug screen performed and appropriate; consent is reviewed and signed and on the chart.  She is aware of risk of addiction on this medication, understands that she will need to follow up for a review every 3 months and her medications will be adjusted or decreased as deemed appropriate at each visit.  No history of drug or alcohol abuse.  No concerns about diversion or abuse. She denies side effects related to the medication.  She is aware that she may be called in for pill counts.  The dosing of this medication will be reviewed on a regular basis and reduced if possible..  Ongoing use of a controlled substance is necessary for this patient to have a normal quality of life Drug screen           Orders:       [id number]  Drug test [facility] qual dir optical obs per day  (In-House)               CHARGE CAPTURE           **Please note: ICD descriptions below are intended for billing purposes only and may not represent clinical diagnoses**        Primary Diagnosis:         401.1 Essential hypertension            I10    Essential (primary) hypertension              Orders:          [id number]   Office/outpatient visit; established patient, level 4  (In-House)           272.0 Hypercholesterolemia            E78.2    Mixed hyperlipidemia    722.52 Degeneration of lumbar disc            M51.37    Other intervertebral disc  degeneration, lumbosacral region    724.00 Thoracolumbar spinal stenosis, Unspecified            M48.00    Spinal stenosis, site unspecified    354.0 Carpal tunnel syndrome            G56.03    Carpal tunnel syndrome, bilateral upper limbs    V58.69 Use of high risk medications            F11.90    Opioid use, unspecified, uncomplicated              Orders:          [id number]   Drug test prsmv qual dir optical obs per day  (In-House)

## 2021-05-18 NOTE — PROGRESS NOTES
Radha Arana  1940     Office/Outpatient Visit    Visit Date: Tue, May 5, 2020 12:12 pm    Provider: Rell Harmon MD (Assistant: Daya Cruz MA)    Location: [facility]        Electronically signed by Rell Harmon MD on  05/05/2020 02:19:00 PM                             Subjective:        CC: Shayy is a 80 year old White female.  This is a follow-up visit.  6 months;         HPI: TELEMEDICINE VISIT:    - Patient consented to telemedicine visit and billing    - Persons on the call include:  myself, patient,     - The call was conducted via: phone          Patient to be evaluated for mixed hyperlipidemia.  Compliance with treatment has been good.  She specifically denies associated symptoms, including muscle pain and weakness.            Essential (primary) hypertension details; compliance with treatment has been good.  She is tolerating the medication well without side effects.  She did not bring her blood pressure diary, but says that pressures have been okay.  She checks BP daily.       She does have a history of atrial fibrillation but she says she is really unaware of any palpitations.  Never has really been able to since them when they occur.  Her heart failure is likewise been under pretty good control.  She does continue to see cardiology.  She is taking Eliquis for stroke prophylaxis.      Continues to have quite a bit of pain issues.  She has generalized osteoarthritis which affects her knees and her back significantly.  She seeRon Ansari about every 3 months and he injects her knees.We are getting her set up to see pain management but once the coronavirus concerns develop she decided to postpone that visit and is still pending.      She says that her breathing has been pretty good.  Occasionally she will have a little bit of congestion and using nebulized breathing treatment and that seems to help quite a bit.As far as her sleep apnea she says she got a new  mask and she is working so much better than what she had before.  In fact she says she wished she would have had this particular type mask all along    ROS:     CONSTITUTIONAL:  Negative for chills, fatigue, fever, and weight change.      EYES:  Negative for blurred vision.      CARDIOVASCULAR:  Negative for chest pain, orthopnea, paroxysmal nocturnal dyspnea and pedal edema.      RESPIRATORY:  Negative for dyspnea.      GASTROINTESTINAL:  Negative for abdominal pain, constipation, diarrhea, nausea and vomiting.      GENITOURINARY:  Negative for dysuria and frequent urination.      NEUROLOGICAL:  Negative for dizziness, headaches, paresthesias, and weakness.      PSYCHIATRIC:  Negative for anxiety, depression, and sleep disturbances.          Past Medical History / Family History / Social History:         Last Reviewed on 2019 12:31 PM by Rell Harmon    Past Medical History:                 PAST MEDICAL HISTORY         Hyperlipidemia    Hypertension     Asthma: mild severity; as child, still sometimes needs albuterol;     Sleep Apnea: uses CPAP;     Gastroesophageal Reflux Disease     Renal Stones     Chronic Pain: affecting the low back;     Hypothyroidism    goiter     Epidural Steroid injection x  ,     bilateral sacrioiliac injection 10/2010     Depression     Obesity: morbid;     optic neuritis with depth perception dysfunction         GYNECOLOGICAL HISTORY:             CURRENT MEDICAL PROVIDERS:    Cardiologist: Dr. Sheppard    E/N/T: Shy (2016 is only visit)    Ophthalmologist: Dr. Fink    Orthopedist: Abigail    Pulmonologist: Candy (not been seen since )    Urologist: Dimitrios             ADVANCE DIRECTIVES: Living will at home         PREVENTIVE HEALTH MAINTENANCE             BONE DENSITY: was last done 2017 with normal results     COLORECTAL CANCER SCREENING: declines colorectal cancer screening, understands reason for testing; defers/declines     EYE EXAM: was last  done 18 The next one is due  Has on next month as well, goes every 6 months.      MAMMOGRAM: was last done 9/24/10 with normal results         PAST MEDICAL HISTORY         Positive for    Afib;     Hospitalizations:    Pneumonia last admit date 16         PAST MEDICAL HISTORY             ADVANCE DIRECTIVE Living will at home         Surgical History:         Cholecystectomy: at age 63;     Hysterectomy: at age 38's;  at age as child     Partial Thyroidectomy;    Carpal Tunnel ;     Positive for    bladder sling;;         Family History:     Father:  at age 68; Cause of death was MI     Mother: Cause of death was altzhiemers at 90     Brother(s): 3 brother(s) total; 1, at birth      Sister(s): 3 sister(s) total;  pacemaker;  Allergies ( one sister )         Social History: Honey Hernandez is her daughter     Occupation: Retired (Prior occupation: [employer], )     Marital Status:          Tobacco/Alcohol/Supplements:     Last Reviewed on 2019 01:40 PM by Spurling, Sarah C    Tobacco: She has never smoked.          Alcohol:  Does not drink alcohol and never has.          Substance Abuse History:     Last Reviewed on 2017 08:52 AM by Rell Harmon        Mental Health History:     Last Reviewed on 2017 08:52 AM by Rell Harmon        Communicable Diseases (eg STDs):     Last Reviewed on 2017 08:52 AM by Rell Harmon        Allergies:     Last Reviewed on 2019 01:40 PM by Spurling, Sarah C    Morphine:          Current Medications:     Last Reviewed on 2019 01:40 PM by Spurling, Sarah C    Ventolin HFA 90 mcg/actuation Inhalation HFA Aerosol Inhaler [inhale 2 puffs (180 mcg) by inhalation route every 4-6 hours as needed]    Multivitamin/Mineral Supplement     omeprazole 20 mg oral capsule,delayed release (enteric coated) [TAKE 1 CAPSULE BY MOUTH EVERY DAY]    levothyroxine 25 mcg oral tablet [TAKE 1 TABLET(S) BY MOUTH  DAILY]    gabapentin 300 mg oral capsule [TAKE 1 CAPSULE BY MOUTH TWICE DAILY]    Oxybutynin Chloride 15 mg oral Tablet, Extended Release 24 hr [Take 1 tablet(s) by mouth daily]    Eliquis 5 mg oral tablet [Take 1 tablet(s) by mouth bid]    metoprolol succinate 50 mg oral Tablet, Extended Release 24 hr [Take 1 tablet(s) by mouth daily]    Colace 100 mg oral capsule [1 QOD]    DULoxetine 60 mg oral capsule,delayed release (enteric coated) [TAKE 1 CAPSULE(S) BY MOUTH TWICE DAILY]    Furosemide 40 mg oral tablet [1 by mouth  daily]    Potassium Chloride 20 mEq oral Tablet, Extended Release Particles/Crystals [1 tab bid ]    Probiotic one tablet po bid      ipratropium-albuterol 0.5 mg-3 mg(2.5 mg base)/3 mL Inhalation Solution for Nebulization [Use 1 vial(s) by nebulizer qid]    Melatonin 5 mg oral tablet [take two tablets qhs]    HYDROcodone-acetaminophen  mg oral tablet [Take 1 tablet(s) by mouth bid prn]    cefdinir 300 mg oral capsule [take 1 capsule (300 mg) by oral route every 12 hours]        Objective:        Vitals:         Current: 5/5/2020 1:45:29 PM    Ht:  5 ft, 1.5 inBP: 117/68 mm Hg (right arm, sitting);  sCr: 0.9 mg/dL;  GFR: 48.40        Exams:     Telehealth visit via telephone.  Patient's voice sounded strong.  There is no evidence of respiratory issues, no cough or congestion audible.  Mental health seem to be good.  Had good insight into the coronavirus issues.        Assessment:         E78.2   Mixed hyperlipidemia       I10   Essential (primary) hypertension       I48.0   Paroxysmal atrial fibrillation       M15.0   Primary generalized (osteo)arthritis       M48.00   Spinal stenosis, site unspecified       G47.33   Obstructive sleep apnea (adult) (pediatric)       F11.90   Opioid use, unspecified, uncomplicated       M85.80   Other specified disorders of bone density and structure, unspecified site       E66.01   Morbid (severe) obesity due to excess calories           Plan: We discussed  issues related to coronavirus/Corvid-19 disease including the importance of social distancing, good hygiene, behaviors for visitors/family, safe grocery shopping practices, etc.  If does have any symptoms call us to discuss so we can help decide if needs to be seen or manage over phone.  Also advised to stay ahead on medication refills and have extra on hand.             Mixed hyperlipidemiaContinue on her current medical regimen.  Labs look so good last time I do not think we need to be in a rush to get her in for blood work    Telehealth: Verbal consent obtained for visit to occur via phone call; Staff, other than provider, present during telephone visit include none; Total time spent was 26 minutes; [id number]--Igbyliaxu E/M 21-30 minutes         Essential (primary) hypertensionSounds like her blood pressures are doing great continue on the current regimen        Paroxysmal atrial fibrillationAgain continue on her current regimen and follow-up with cardiology as they direct        Primary generalized (osteo)arthritisShe will follow-up with Dr. Ansari for her injections        Spinal stenosis, site unspecifiedI will keep her on her current medical regimen for pain control.  She will be due for urine drug screen in June but I told her if the coronavirus is still a concern at that point we can delay the urine drug screen if necessary        Obstructive sleep apnea (adult) (pediatric)Continue with the use of her positive airway pressure and follow-up with sleep specialist as directed        Opioid use, unspecified, uncomplicatedAs noted above        Other specified disorders of bone density and structure, unspecified siteWe did review the results of her DEXA scan.  Discussed options of treatment versus repeat monitoring.  She opted for the latter.        Morbid (severe) obesity due to excess caloriesAnd encourage continue efforts at weight loss she knows that would be helpful to several of her comorbidities.             Other Patient Education Handouts:     Dragon transcription disclaimer:        Much of this encounter note is an electronic transcription/translation of spoken language to printed text.  The electronic translation of spoken language may permit erroneous, or at times, nonsensical words or phrases to be inadvertently transcribed.  Although I have reviewed the note for such errors, some may still exist.        Charge Capture:         Primary Diagnosis:     E78.2  Mixed hyperlipidemia           Orders:      [id number]  Phys/QHP telephone evaluation 21-30 minutes  (In-House)              I10  Essential (primary) hypertension     I48.0  Paroxysmal atrial fibrillation     M15.0  Primary generalized (osteo)arthritis     M48.00  Spinal stenosis, site unspecified     G47.33  Obstructive sleep apnea (adult) (pediatric)     F11.90  Opioid use, unspecified, uncomplicated     M85.80  Other specified disorders of bone density and structure, unspecified site     E66.01  Morbid (severe) obesity due to excess calories

## 2021-05-18 NOTE — PROGRESS NOTES
Radha Arana 1940     Office/Outpatient Visit    Visit Date: Wed, Yeyo 3, 2018 12:22 pm    Provider: Soo Rosado N.P. (Assistant: Katrina Gutierrez MA)    Location: [facility]        Electronically signed by Soo Rosado N.P. on  2018 08:31:30 AM                             SUBJECTIVE:        CC: (PT NOT TAKING CYMBALTA ANYMORE)     Shayy is a 77 year old White female.  She presents with SOA, Hoarseness.          HPI:         Patient complains of upper respiratory illness.  These have been present for the past 4 weeks.  The symptoms include cough, sinus pain/pressure and sore throat.  Seen at Clinic. Prescribed a medication that did not agree with her but does not recall the name of it. She was also seen in ER. Was given prescription for Flonase. She had chest xray that was normal on 17.      ROS:     CONSTITUTIONAL:  Negative for chills and fever.      EYES:  Negative for eye drainage and eye pain.      E/N/T:  Positive for sinus pressure and sore throat.      CARDIOVASCULAR:  Negative for chest pain and palpitations.      RESPIRATORY:  Negative for dyspnea and frequent wheezing.      GASTROINTESTINAL:  Negative for abdominal pain and vomiting.          PM/[facility]/:     Last Reviewed on 2017 08:52 AM by Rell Harmon    Past Medical History:                 PAST MEDICAL HISTORY         Hyperlipidemia    Hypertension     Asthma: mild severity; as child, still sometimes needs albuterol;     Sleep Apnea: uses CPAP;     Gastroesophageal Reflux Disease     Renal Stones     Chronic Pain: affecting the low back;     Hypothyroidism    goiter     Epidural Steroid injection x 2007    bilateral sacrioiliac injection 10/2010     Depression     Obesity: morbid;     optic neuritis with depth perception dysfunction         GYNECOLOGICAL HISTORY:             CURRENT MEDICAL PROVIDERS:    Cardiologist: Dr. Sheppard    E/N/T: Shy (2016 is only visit)    Ophthalmologist:   Aleks    Orthopedist: Abigail    Pulmonologist: Candy (not been seen since )             ADVANCE DIRECTIVES: Living will at home         PREVENTIVE HEALTH MAINTENANCE             BONE DENSITY: was last done 2017 with normal results     COLORECTAL CANCER SCREENING: declines colorectal cancer screening, understands reason for testing; defers/declines     MAMMOGRAM: was last done 9/24/10         PAST MEDICAL HISTORY         Hospitalizations:    Pneumonia last admit date 16         PAST MEDICAL HISTORY             ADVANCE DIRECTIVE Living will at home         Surgical History:         Cholecystectomy: at age 63;     Hysterectomy: at age 38's;  at age as child      Partial Thyroidectomy;     Positive for    bladder sling;;         Family History:     Father:  at age 68; Cause of death was MI     Mother: Cause of death was altzhiemers at 90     Brother(s): 3 brother(s) total; 1, at birth      Sister(s): 3 sister(s) total;  pacemaker;  Allergies ( one sister )         Social History: Honey Hernandez is her daughter     Occupation: Retired (Prior occupation: [employer], )     Marital Status:          Tobacco/Alcohol/Supplements:     Last Reviewed on 2017 01:22 PM by Daya Cruz    Tobacco: She has never smoked.          Alcohol:  Does not drink alcohol and never has.          Substance Abuse History:     Last Reviewed on 2017 08:52 AM by Rell Harmon        Mental Health History:     Last Reviewed on 2017 08:52 AM by Rell Harmon        Communicable Diseases (eg STDs):     Last Reviewed on 2017 08:52 AM by Rell Harmon            Current Problems:     Last Reviewed on 2017 08:52 AM by Rell Harmon    Neoplasm of uncertain behavior: parotid gland     Use of high risk medications     Nasal lesion     Osteoarthritis of knee     Paroxysmal atrial fibrillation     Tachycardia, unspecified     Hoarseness     Sleep apnea      Leukocytosis, unspecified     Cystocele, midline     Other high-risk medications     Morbid obesity     Degeneration of lumbar disc     Hypothyroidism, postsurgical     Essential hypertension     Low back pain     Hypercholesterolemia     Hypercalcemia     Patient visit for long term (current) use of other drugs     Elevated fasting glucose     GERD     Prediabetes     Generalized osteoarthritis, site unspecified     Abnormal mammogram, unspecified     Right Lower Flank Pain         Immunizations:     Prevnar (Pneumococcal PCV 7) 3/26/2015     Fluvirin (4 + years dose) 9/22/2014     Fluzone (3 + years dose) 9/27/2011     Fluzone (3 + years dose) 11/13/2012     Fluzone High-Dose pf (>=65 yr) 10/16/2013     Fluzone High-Dose pf (>=65 yr) 9/21/2015     Fluzone High-Dose pf (>=65 yr) 9/21/2016     Fluzone High-Dose pf (>=65 yr) 10/2/2017     Pneumococcal, 23-valent IM/SC (adult and pt >=2yr) 2/23/2011     Boostrix (Tdap) 4/17/2015     Zostavax (Zoster) 4/17/2015         Allergies:     Last Reviewed on 11/13/2017 01:22 PM by Daya Cruz    Morphine:        Current Medications:     Last Reviewed on 11/13/2017 01:24 PM by Daya Cruz    Hydrocodone/Acetaminophen 10mg/325mg Tablet 1 QID PRN     Omeprazole 20mg Capsules, Extended Release one a day     Neurontin 300mg Capsules Take 1 capsule(s) by mouth bid     Synthroid 0.025mg Tablet Take 1 tablet(s) by mouth daily     Cymbalta 60mg Capsules, Delayed Release Take 1 capsule(s) by mouth bid     Eliquis 5mg Tablet Take 1 tablet(s) by mouth bid     Metoprolol Succinate 50mg Tablets, Extended Release Take 1 tablet(s) by mouth daily     magnesium 250mg  1tab daily     Vitamin B12 1,000mcg Tablet 1 bid     Tolterodine 4mg Capsules, Extended Release 1 capsule daily     Multivitamin/Mineral Supplement     Albuterol 90mcg/1actuation Oral Inhaler 1 puff  PRN     Calcium Carbonate 500mg Chewable Tablet Chew 1 tablet(s) by mouth bid     Glucosamine/Chondroitin  750mg/600mg Tablet Take 2 tablet(s) by mouth daily     Ocuvite eye vitamin daily     Vitamin D3 2,000IU Capsules 2 capsules daily     Vitamin C 100mg Tablet 1 tab bid         OBJECTIVE:        Vitals:         Current: 1/3/2018 12:29:20 PM    Ht:  5 ft, 1.5 in;  Wt: 221.8 lbs;  BMI: 41.2    T: 97.8 F (tympanic);  BP: 136/86 mm Hg (left arm, sitting);  P: 102 bpm (finger clip, sitting);  sCr: 0.72 mg/dL;  GFR: 72.89    O2 Sat: 95 % (room air)        Exams:     PHYSICAL EXAM:     GENERAL: well developed, well nourished;  no apparent distress;     EYES: PERRL, EOMI     E/N/T: EARS: external auditory canal normal;  both TMs are dull;  NOSE: normal turbinates; bilateral maxillary sinus tenderness present; OROPHARYNX: oral mucosa is normal; posterior pharynx shows no exudate;     NECK: range of motion is normal; trachea is midline;     RESPIRATORY: normal respiratory rate and pattern with no distress; normal breath sounds with no rales, rhonchi, wheezes or rubs;     CARDIOVASCULAR: normal rate; rhythm is regular;     MUSCULOSKELETAL: normal gait;     NEUROLOGIC: mental status: alert and oriented x 3; GROSSLY INTACT     PSYCHIATRIC: appropriate affect and demeanor;         ASSESSMENT           466.0   J20.9  Acute bronchitis              DDx:     461.8   J01.90  Acute sinusitis, other              DDx:         ORDERS:         Meds Prescribed:       Bromfed-DM (Brompheniramine/Dextromethorphan/Pseudoephedrine) Syrup 1  to 2  tsp po every 4-6 hrs prn cough/congestion.  #480 ([place]) ml Refills: 0       Amoxicillin 875mg Tablet One PO BID X 10 days.  #20 (Twenty) tablet(s) Refills: 0                 PLAN:          Acute bronchitis         RECOMMENDATIONS given include: Push Fluids, Rest, Follow up if no improvement or worsening symptoms like high fevers, vomiting, weakness, or increasing shortness of air.     and Salt water gargles. Cepacol lozenges. Chloraseptic sprays..      FOLLOW-UP: Schedule follow-up  appointments on a p.r.n. basis.            Prescriptions:       Bromfed-DM (Brompheniramine/Dextromethorphan/Pseudoephedrine) Syrup 1  to 2  tsp po every 4-6 hrs prn cough/congestion.  #480 ([place] [address] [place]) ml Refills: 0          Acute sinusitis, other As above.           Prescriptions:       Amoxicillin 875mg Tablet One PO BID X 10 days.  #20 (Twenty) tablet(s) Refills: 0             Patient Recommendations:        For  Acute bronchitis:     Schedule follow-up appointments as needed.              CHARGE CAPTURE           **Please note: ICD descriptions below are intended for billing purposes only and may not represent clinical diagnoses**        Primary Diagnosis:         466.0 Acute bronchitis            J20.9    Acute bronchitis, unspecified              Orders:          18405   Office/outpatient visit; established patient, level 3  (In-House)           461.8 Acute sinusitis, other            J01.90    Acute sinusitis, unspecified

## 2021-05-18 NOTE — PROGRESS NOTES
Radha Arana 1940     Office/Outpatient Visit    Visit Date: Tue, Apr 30, 2019 01:13 pm    Provider: Rell Harmon MD (Assistant: Nithya Bishop LPN)    Location: [facility]        Electronically signed by Rell Harmon MD on  05/04/2019 07:50:43 PM                             SUBJECTIVE:        CC:     Shayy is a 79 year old White female.  This is a follow-up visit.          HPI:         Patient presents with essential hypertension.  She has not kept a blood pressure diary, but states that typical readings show systolics in the 100-120 and diastolics in the 60-70 range.  She is tolerating the medication well without side effects.  Compliance with treatment has been good.      Last time she was here she was following up on her recent hospitalization for pneumonia.  Since that visit she says she is not had any problems with fever or change in sputum production.  Her breathing seems to be better.  Last time she told me she had started using her CPAP again but now she tells me that her mask does not fit right and she is not using it.  We discussed the implications of untreated sleep apnea and she is willing to go ahead and see the sleep specialist again .     We also discussed the very sad turn of events in that her daughter-in-law who accompanied her to the last office visit and was going to be taking care of her ended up getting sick herself and has passed away     ROS:     CONSTITUTIONAL:  Negative for chills, fatigue, fever, and weight change.      EYES:  Negative for blurred vision.      CARDIOVASCULAR:  Negative for chest pain, orthopnea, paroxysmal nocturnal dyspnea ( Improved since using the hospital bed ) and pedal edema.      RESPIRATORY:  Negative for dyspnea.      GASTROINTESTINAL:  Negative for abdominal pain, constipation, diarrhea, nausea and vomiting.      GENITOURINARY:  Negative for dysuria and frequent urination.      NEUROLOGICAL:  Negative for dizziness, headaches,  paresthesias, and weakness.      PSYCHIATRIC:  Negative for anxiety, depression, and sleep disturbances.          PMH/FMH/SH:     Last Reviewed on 12/10/2018 09:31 AM by Rell Harmon    Past Medical History:                 PAST MEDICAL HISTORY         Hyperlipidemia    Hypertension     Asthma: mild severity; as child, still sometimes needs albuterol;     Sleep Apnea: uses CPAP;     Gastroesophageal Reflux Disease     Renal Stones     Chronic Pain: affecting the low back;     Hypothyroidism    goiter     Epidural Steroid injection x 2007    bilateral sacrioiliac injection 10/2010     Depression     Obesity: morbid;     optic neuritis with depth perception dysfunction         GYNECOLOGICAL HISTORY:             CURRENT MEDICAL PROVIDERS:    Cardiologist: Dr. Sheppard    E/N/T: Shy (2016 is only visit)    Ophthalmologist: Dr. Fink    Orthopedist: Abigail    Pulmonologist: Candy (not been seen since )    Urologist: Dimitrios             ADVANCE DIRECTIVES: Living will at home         PREVENTIVE HEALTH MAINTENANCE             BONE DENSITY: was last done 2017 with normal results     COLORECTAL CANCER SCREENING: declines colorectal cancer screening, understands reason for testing; defers/declines     EYE EXAM: was last done 18 The next one is due  Has on next month as well, goes every 6 months.      MAMMOGRAM: was last done 9/24/10 with normal results         PAST MEDICAL HISTORY         Positive for    Afib;     Hospitalizations:    Pneumonia last admit date 16         PAST MEDICAL HISTORY             ADVANCE DIRECTIVE Living will at home         Surgical History:         Cholecystectomy: at age 63;     Hysterectomy: at age 38's;  at age as child      Partial Thyroidectomy;    Carpal Tunnel ;     Positive for    bladder sling;;         Family History:     Father:  at age 68; Cause of death was MI     Mother: Cause of death was altzhiemers at 90     Brother(s): 3 brother(s) total;  1, at birth      Sister(s): 3 sister(s) total;  pacemaker;  Allergies ( one sister )         Social History: Honey Hernandez is her daughter     Occupation: Retired (Prior occupation: [employer], )     Marital Status:          Tobacco/Alcohol/Supplements:     Last Reviewed on 3/26/2019 12:48 PM by Naima Lara    Tobacco: She has never smoked.          Alcohol:  Does not drink alcohol and never has.          Substance Abuse History:     Last Reviewed on 2017 08:52 AM by Rell Harmon        Mental Health History:     Last Reviewed on 2017 08:52 AM by Rell Harmon        Communicable Diseases (eg STDs):     Last Reviewed on 2017 08:52 AM by Rell Harmon            Allergies:     Last Reviewed on 3/26/2019 12:48 PM by Naima Lara    Morphine:        Current Medications:     Last Reviewed on 3/26/2019 12:48 PM by Naima Lara    Neurontin 300mg Capsules Take 1 capsule(s) by mouth bid     Omeprazole 20mg Capsules, Extended Release one a day     Synthroid 0.025mg Tablet Take 1 tablet(s) by mouth daily     Duloxetine HCl 60mg Capsules, Delayed Release Take 1 capsule(s) by mouth bid     Ipratropium Bromide/Albuterol 0.5mg/3mg per 3ml Nebulizer Solution every 4-6 hours as needed     Eliquis 5mg Tablet Take 1 tablet(s) by mouth bid     Metoprolol Succinate 50mg Tablets, Extended Release Take 1 tablet(s) by mouth daily     Multivitamin/Mineral Supplement     Albuterol 90mcg/1actuation Oral Inhaler 1 puff  PRN     Furosemide 40mg Tablets 1 by mouth  daily     Potassium Chloride 20mEq Tablets, Extended Release 1 tab bid     Probiotic one tablet po bid     Ocuvite eye vitamin daily         OBJECTIVE:        Vitals:         Current: 2019 1:14:55 PM    Ht:  5 ft, 1.5 in;  Wt: 211.4 lbs;  BMI: 39.3    T: 98.5 F (oral);  BP: 138/70 mm Hg (right arm, sitting);  P: 79 bpm (right arm (BP Cuff), sitting);  sCr: 0.93 mg/dL;  GFR: 53.55    O2 Sat: 95 % (2  liters O2)        Exams:     PHYSICAL EXAM:     GENERAL: morbidly obese;  well groomed;  no apparent distress, Certainly improved and close to her usual state of health;     EYES: nonicteric;     E/N/T:  normal EACs, TMs, nasal/oral mucosa, teeth, gingiva, and oropharynx;     NECK: range of motion is normal; thyroid exam reveals scar from surgery;  carotid exam is normal with good upstroke and no bruits;     RESPIRATORY: normal appearance and symmetric expansion of chest wall; normal respiratory rate and pattern with no distress; Diminished airflow with a few crackles in the bases;     CARDIOVASCULAR: normal rate; rhythm is regular;  no systolic murmur;     GASTROINTESTINAL: nontender; normal bowel sounds; no organomegaly; no masses;     LYMPHATIC: no enlargement of cervical or facial nodes; no supraclavicular nodes;     MUSCULOSKELETAL: globular knees, 1-2 + pedal edema     NEUROLOGIC: no lateralizing deficits.;     PSYCHIATRIC: Coping and grieving appropriately;         ASSESSMENT           401.1   I10  Essential hypertension              DDx:     327.23   G47.33  Obstructive sleep apnea (adult) (pediatric)              DDx:     428.42   I50.42  Combined systolic and diastolic heart failure, chronic              DDx:     278.01   E66.01  Morbid obesity              DDx:         ORDERS:         Procedures Ordered:       REFER  Referral to Specialist or Other Facility  (Send-Out)           Other Orders:       49964  Noninvasive ear or pulse oximetry for oxygen saturation; single determination  (In-House)                   PLAN:          Essential hypertension We will continue current medication          Obstructive sleep apnea (adult) (pediatric) Get her back and see the sleep specialist again. Since she has gotten stronger she has not had any difficulty with swallowing.  We will go ahead and hold off on MBS for now         REFERRALS:  Referral initiated to a pulmonologist ( Dr. Guerra ).            Orders:        REFER  Referral to Specialist or Other Facility  (Send-Out)         89636  Noninvasive ear or pulse oximetry for oxygen saturation; single determination  (In-House)            Combined systolic and diastolic heart failure, chronic Her heart failure seems to be compensated at the present time.  She will be seeing the cardiology soon          Morbid obesity While it is not our recommended method she has lost weight and I encouraged her to do what she can to keep from gaining again             Patient Recommendations:    Dragon transcription disclaimer:        Much of this encounter note is an electronic transcription/translation of spoken language to printed text.  The electronic translation of spoken language may permit erroneous, or at times, nonsensical words or phrases to be inadvertently transcribed.  Although I have reviewed the note for such errors, some may still exist.             CHARGE CAPTURE           **Please note: ICD descriptions below are intended for billing purposes only and may not represent clinical diagnoses**        Primary Diagnosis:         401.1 Essential hypertension            I10    Essential (primary) hypertension              Orders:          47325   Office/outpatient visit; established patient, level 4  (In-House)           327.23 Obstructive sleep apnea (adult) (pediatric)            G47.33    Obstructive sleep apnea (adult) (pediatric)              Orders:          46039   Noninvasive ear or pulse oximetry for oxygen saturation; single determination  (In-House)           428.42 Combined systolic and diastolic heart failure, chronic            I50.42    Chronic combined systolic (congestive) and diastolic (congestive) heart failure    278.01 Morbid obesity            E66.01    Morbid (severe) obesity due to excess calories

## 2021-06-21 RX ORDER — DULOXETIN HYDROCHLORIDE 60 MG/1
60 CAPSULE, DELAYED RELEASE ORAL DAILY
COMMUNITY
Start: 2021-06-21 | End: 2022-02-07 | Stop reason: SDUPTHER

## 2021-06-21 RX ORDER — POTASSIUM CHLORIDE 20 MEQ/1
TABLET, EXTENDED RELEASE ORAL
COMMUNITY
Start: 2021-05-17 | End: 2021-06-21 | Stop reason: SDUPTHER

## 2021-06-21 RX ORDER — METFORMIN HYDROCHLORIDE 500 MG/1
500 TABLET, EXTENDED RELEASE ORAL
COMMUNITY
Start: 2021-06-21 | End: 2021-11-10

## 2021-06-21 RX ORDER — GABAPENTIN 300 MG/1
CAPSULE ORAL
COMMUNITY
Start: 2021-06-21 | End: 2021-07-26 | Stop reason: SDUPTHER

## 2021-06-21 RX ORDER — OXYBUTYNIN CHLORIDE 15 MG/1
15 TABLET, EXTENDED RELEASE ORAL DAILY
COMMUNITY
Start: 2021-06-21 | End: 2022-02-07 | Stop reason: SDUPTHER

## 2021-06-21 RX ORDER — FUROSEMIDE 40 MG/1
TABLET ORAL
COMMUNITY
Start: 2021-05-17 | End: 2021-06-22 | Stop reason: SDUPTHER

## 2021-06-21 RX ORDER — OMEPRAZOLE 20 MG/1
CAPSULE, DELAYED RELEASE ORAL
COMMUNITY
Start: 2021-06-21 | End: 2021-07-22 | Stop reason: SDUPTHER

## 2021-06-21 RX ORDER — METOPROLOL SUCCINATE 50 MG/1
50 TABLET, EXTENDED RELEASE ORAL DAILY
COMMUNITY
Start: 2021-06-21 | End: 2021-07-30

## 2021-06-21 RX ORDER — ALBUTEROL SULFATE 90 UG/1
2 AEROSOL, METERED RESPIRATORY (INHALATION) EVERY 4 HOURS PRN
COMMUNITY
End: 2022-05-04 | Stop reason: SDUPTHER

## 2021-06-21 RX ORDER — LISINOPRIL 10 MG/1
10 TABLET ORAL DAILY
COMMUNITY
Start: 2021-06-21 | End: 2022-02-07 | Stop reason: SDUPTHER

## 2021-06-21 RX ORDER — DOCUSATE SODIUM 100 MG/1
100 CAPSULE, LIQUID FILLED ORAL
COMMUNITY
End: 2021-07-30

## 2021-06-21 RX ORDER — MONTELUKAST SODIUM 4 MG/1
TABLET, CHEWABLE ORAL
COMMUNITY
Start: 2021-06-04 | End: 2021-06-25 | Stop reason: SDUPTHER

## 2021-06-21 RX ORDER — PHENOL 1.4 %
10 AEROSOL, SPRAY (ML) MUCOUS MEMBRANE NIGHTLY
COMMUNITY

## 2021-06-21 RX ORDER — IPRATROPIUM BROMIDE AND ALBUTEROL SULFATE 2.5; .5 MG/3ML; MG/3ML
3 SOLUTION RESPIRATORY (INHALATION) EVERY 4 HOURS PRN
COMMUNITY
End: 2023-01-31 | Stop reason: SDUPTHER

## 2021-06-21 RX ORDER — APIXABAN 5 MG/1
5 TABLET, FILM COATED ORAL EVERY 12 HOURS SCHEDULED
COMMUNITY
Start: 2021-06-21

## 2021-06-21 RX ORDER — LEVOTHYROXINE SODIUM 0.03 MG/1
25 TABLET ORAL
COMMUNITY
Start: 2021-06-21 | End: 2021-10-13 | Stop reason: SDUPTHER

## 2021-06-22 RX ORDER — POTASSIUM CHLORIDE 20 MEQ/1
20 TABLET, EXTENDED RELEASE ORAL 2 TIMES DAILY
Qty: 180 TABLET | Refills: 0 | Status: SHIPPED | OUTPATIENT
Start: 2021-06-22 | End: 2021-07-22 | Stop reason: SDUPTHER

## 2021-06-22 NOTE — TELEPHONE ENCOUNTER
[facility] [place] [address] [facility] [address]  - [phone] [facility] [phone] FX    Relationship: Pharmacy    Best call back number: [phone]  Medication needed:   Requested Prescriptions     Pending Prescriptions Disp Refills   • furosemide (LASIX) 40 MG tablet         When do you need the refill by: ASAP    What additional details did the patient provide when requesting the medication: PHARMACY STATES THEY DID NOT RECEIVE THE PRESCRIPTION  Does the patient have less than a 3 day supply:  [x] Yes  [] No    What is the patient's preferred pharmacy: [facility] [place] [address]  - [phone] [facility] [phone] FX

## 2021-06-23 RX ORDER — FUROSEMIDE 40 MG/1
40 TABLET ORAL DAILY
Qty: 90 TABLET | Refills: 0 | Status: SHIPPED | OUTPATIENT
Start: 2021-06-23 | End: 2021-09-24 | Stop reason: SDUPTHER

## 2021-06-24 ENCOUNTER — TELEPHONE (OUTPATIENT)
Dept: FAMILY MEDICINE CLINIC | Age: 81
End: 2021-06-24

## 2021-06-24 NOTE — TELEPHONE ENCOUNTER
Caller: Radha Arana    Relationship: Self    Best call back number: [phone]    What was the call regarding: PATIENT CALLED ASKING IF SHE SHOULD GO BACK ON HER METFORMIN. PLEASE CALL TO ADVISE.     Do you require a callback: YES

## 2021-06-25 DIAGNOSIS — R19.7 DIARRHEA, UNSPECIFIED TYPE: Primary | ICD-10-CM

## 2021-06-25 RX ORDER — MONTELUKAST SODIUM 4 MG/1
1 TABLET, CHEWABLE ORAL 2 TIMES DAILY PRN
Qty: 60 TABLET | Refills: 1 | Status: SHIPPED | OUTPATIENT
Start: 2021-06-25 | End: 2021-08-26

## 2021-06-25 NOTE — TELEPHONE ENCOUNTER
Pt states that she finished the meds you gave her for the diarrhea and the diarrhea is better, but not gone, but is having the colonoscopy as soon as her daughter can get it scheduled.

## 2021-06-25 NOTE — TELEPHONE ENCOUNTER
She can continue to hold the metformin until after the colonoscopy.  I will send in refill for the Colestipol.    mas

## 2021-07-01 VITALS
WEIGHT: 237 LBS | HEART RATE: 100 BPM | HEIGHT: 62 IN | SYSTOLIC BLOOD PRESSURE: 146 MMHG | BODY MASS INDEX: 43.61 KG/M2 | TEMPERATURE: 98.5 F | DIASTOLIC BLOOD PRESSURE: 53 MMHG

## 2021-07-01 VITALS
BODY MASS INDEX: 38.9 KG/M2 | HEIGHT: 62 IN | DIASTOLIC BLOOD PRESSURE: 70 MMHG | TEMPERATURE: 98.5 F | OXYGEN SATURATION: 95 % | SYSTOLIC BLOOD PRESSURE: 138 MMHG | HEART RATE: 79 BPM | WEIGHT: 211.4 LBS

## 2021-07-01 VITALS
HEART RATE: 81 BPM | BODY MASS INDEX: 43.39 KG/M2 | TEMPERATURE: 97.8 F | DIASTOLIC BLOOD PRESSURE: 78 MMHG | WEIGHT: 235.8 LBS | SYSTOLIC BLOOD PRESSURE: 128 MMHG | HEIGHT: 62 IN

## 2021-07-01 VITALS
HEIGHT: 62 IN | BODY MASS INDEX: 40.76 KG/M2 | HEART RATE: 95 BPM | SYSTOLIC BLOOD PRESSURE: 132 MMHG | DIASTOLIC BLOOD PRESSURE: 72 MMHG | TEMPERATURE: 96.8 F | WEIGHT: 221.5 LBS

## 2021-07-01 VITALS
HEIGHT: 62 IN | WEIGHT: 228.8 LBS | DIASTOLIC BLOOD PRESSURE: 78 MMHG | BODY MASS INDEX: 42.1 KG/M2 | TEMPERATURE: 97.3 F | HEART RATE: 94 BPM | SYSTOLIC BLOOD PRESSURE: 134 MMHG

## 2021-07-01 VITALS
HEART RATE: 96 BPM | WEIGHT: 198 LBS | TEMPERATURE: 99.9 F | BODY MASS INDEX: 36.44 KG/M2 | HEIGHT: 62 IN | DIASTOLIC BLOOD PRESSURE: 63 MMHG | SYSTOLIC BLOOD PRESSURE: 102 MMHG

## 2021-07-01 VITALS
BODY MASS INDEX: 41.15 KG/M2 | HEART RATE: 94 BPM | WEIGHT: 223.6 LBS | OXYGEN SATURATION: 94 % | TEMPERATURE: 97.4 F | HEIGHT: 62 IN

## 2021-07-01 VITALS
TEMPERATURE: 97.7 F | WEIGHT: 210.2 LBS | BODY MASS INDEX: 38.68 KG/M2 | DIASTOLIC BLOOD PRESSURE: 60 MMHG | HEIGHT: 62 IN | SYSTOLIC BLOOD PRESSURE: 110 MMHG | HEART RATE: 111 BPM

## 2021-07-01 VITALS
DIASTOLIC BLOOD PRESSURE: 65 MMHG | HEART RATE: 80 BPM | WEIGHT: 202.6 LBS | BODY MASS INDEX: 37.28 KG/M2 | TEMPERATURE: 99.1 F | HEIGHT: 62 IN | SYSTOLIC BLOOD PRESSURE: 97 MMHG

## 2021-07-01 VITALS
SYSTOLIC BLOOD PRESSURE: 136 MMHG | OXYGEN SATURATION: 95 % | TEMPERATURE: 97.8 F | HEIGHT: 62 IN | WEIGHT: 221.8 LBS | HEART RATE: 102 BPM | DIASTOLIC BLOOD PRESSURE: 86 MMHG | BODY MASS INDEX: 40.82 KG/M2

## 2021-07-01 VITALS
HEIGHT: 62 IN | WEIGHT: 215.6 LBS | BODY MASS INDEX: 39.67 KG/M2 | DIASTOLIC BLOOD PRESSURE: 62 MMHG | TEMPERATURE: 98.5 F | HEART RATE: 106 BPM | SYSTOLIC BLOOD PRESSURE: 108 MMHG

## 2021-07-01 VITALS
TEMPERATURE: 98.2 F | DIASTOLIC BLOOD PRESSURE: 58 MMHG | BODY MASS INDEX: 43.24 KG/M2 | HEIGHT: 62 IN | WEIGHT: 235 LBS | SYSTOLIC BLOOD PRESSURE: 132 MMHG | HEART RATE: 83 BPM

## 2021-07-02 VITALS
HEART RATE: 89 BPM | TEMPERATURE: 96.9 F | OXYGEN SATURATION: 98 % | DIASTOLIC BLOOD PRESSURE: 65 MMHG | HEIGHT: 62 IN | WEIGHT: 207 LBS | BODY MASS INDEX: 38.09 KG/M2 | SYSTOLIC BLOOD PRESSURE: 131 MMHG

## 2021-07-02 VITALS
DIASTOLIC BLOOD PRESSURE: 60 MMHG | OXYGEN SATURATION: 100 % | HEART RATE: 85 BPM | WEIGHT: 195.8 LBS | SYSTOLIC BLOOD PRESSURE: 94 MMHG | BODY MASS INDEX: 36.03 KG/M2 | HEIGHT: 62 IN | TEMPERATURE: 97.7 F

## 2021-07-02 VITALS
BODY MASS INDEX: 36.31 KG/M2 | DIASTOLIC BLOOD PRESSURE: 47 MMHG | OXYGEN SATURATION: 96 % | HEART RATE: 87 BPM | TEMPERATURE: 96.7 F | WEIGHT: 197.3 LBS | HEIGHT: 62 IN | SYSTOLIC BLOOD PRESSURE: 119 MMHG

## 2021-07-02 VITALS — HEIGHT: 62 IN | DIASTOLIC BLOOD PRESSURE: 68 MMHG | BODY MASS INDEX: 36.81 KG/M2 | SYSTOLIC BLOOD PRESSURE: 117 MMHG

## 2021-07-15 ENCOUNTER — TELEPHONE (OUTPATIENT)
Dept: FAMILY MEDICINE CLINIC | Age: 81
End: 2021-07-15

## 2021-07-15 NOTE — TELEPHONE ENCOUNTER
Caller: Radha Arana    Relationship: Self    Best call back number: [phone]    What is the best time to reach you: ANYTIME        Who are you requesting to speak with (clinical staff, provider,  specific staff member): HARJIT    Do you know the name of the person who called: PATIENT    What was the call regarding:  PATIENT IS HAVING COLONOSCOPY NEXT MONTH. PATIENT NEEDS TO KNOW IF DR CONTE THINKS IT IS OK FOR HER TO STOP HER BLOOD THINNER 2 DAYS PRIOR TO TEST. PLEASE CALL PATIENT AND ADVISE.    Do you require a callback: YES

## 2021-07-16 ENCOUNTER — TELEPHONE (OUTPATIENT)
Dept: FAMILY MEDICINE CLINIC | Age: 81
End: 2021-07-16

## 2021-07-16 NOTE — TELEPHONE ENCOUNTER
Caller: Radha Arana    Relationship to patient: Self    Best call back number: [phone]     Patient is needing: PT NEEDS HARJIT TO CALL HER BACK ASAP, THANK YOU    UNABLE TO WARM TRANSFER.

## 2021-07-21 ENCOUNTER — TELEPHONE (OUTPATIENT)
Dept: FAMILY MEDICINE CLINIC | Age: 81
End: 2021-07-21

## 2021-07-22 DIAGNOSIS — M17.0 PRIMARY OSTEOARTHRITIS OF BOTH KNEES: Primary | ICD-10-CM

## 2021-07-22 DIAGNOSIS — K21.9 GASTROESOPHAGEAL REFLUX DISEASE WITHOUT ESOPHAGITIS: ICD-10-CM

## 2021-07-22 DIAGNOSIS — E87.6 HYPOKALEMIA: ICD-10-CM

## 2021-07-24 RX ORDER — OMEPRAZOLE 20 MG/1
20 CAPSULE, DELAYED RELEASE ORAL DAILY
Qty: 60 CAPSULE | Refills: 0 | Status: SHIPPED | OUTPATIENT
Start: 2021-07-24 | End: 2021-07-30

## 2021-07-24 RX ORDER — POTASSIUM CHLORIDE 20 MEQ/1
20 TABLET, EXTENDED RELEASE ORAL 2 TIMES DAILY
Qty: 180 TABLET | Refills: 0 | Status: SHIPPED | OUTPATIENT
Start: 2021-07-24 | End: 2021-12-13 | Stop reason: SDUPTHER

## 2021-07-26 ENCOUNTER — TELEPHONE (OUTPATIENT)
Dept: FAMILY MEDICINE CLINIC | Age: 81
End: 2021-07-26

## 2021-07-26 DIAGNOSIS — M17.0 PRIMARY OSTEOARTHRITIS OF BOTH KNEES: Primary | ICD-10-CM

## 2021-07-26 RX ORDER — GABAPENTIN 300 MG/1
300 CAPSULE ORAL 2 TIMES DAILY
Qty: 60 CAPSULE | Refills: 0 | Status: SHIPPED | OUTPATIENT
Start: 2021-07-26 | End: 2021-08-13 | Stop reason: SDUPTHER

## 2021-07-26 RX ORDER — GABAPENTIN 300 MG/1
300 CAPSULE ORAL 2 TIMES DAILY
Qty: 60 CAPSULE | Refills: 0 | OUTPATIENT
Start: 2021-07-26

## 2021-07-26 NOTE — TELEPHONE ENCOUNTER
Caller: Radha Arana    Relationship to patient: Self    Best call back number: [phone]    Patient is needing: PATIENT CALLED AND REQUESTED A CALL BACK FROM HARJIT. PATIENT STATED THAT SHE IS HAVING SEVERE SHOULDER PAIN.     PLEASE CALL AND ADVISE.

## 2021-07-28 ENCOUNTER — TELEPHONE (OUTPATIENT)
Dept: FAMILY MEDICINE CLINIC | Age: 81
End: 2021-07-28

## 2021-07-28 NOTE — TELEPHONE ENCOUNTER
Caller: Radha Arana    Relationship to patient: Self    Best call back number:[phone]  Patient is needing: TO SEE IF SHE CAN GET INTO SE DR. CONTE TOMORROW, BOTH SHOULDERS ARE HURTING HER TO WHERE SHE CAN'T LEFT HER ARMS.  PATIENT STRESSED SHE REALLY NEEDS TO BE SEEN. HARJIT PLEASE CALL.

## 2021-07-30 ENCOUNTER — OFFICE VISIT (OUTPATIENT)
Dept: FAMILY MEDICINE CLINIC | Age: 81
End: 2021-07-30

## 2021-07-30 ENCOUNTER — HOSPITAL ENCOUNTER (OUTPATIENT)
Dept: GENERAL RADIOLOGY | Facility: HOSPITAL | Age: 81
Discharge: HOME | End: 2021-07-30

## 2021-07-30 ENCOUNTER — LAB (OUTPATIENT)
Dept: LAB | Facility: HOSPITAL | Age: 81
End: 2021-07-30

## 2021-07-30 VITALS
SYSTOLIC BLOOD PRESSURE: 98 MMHG | DIASTOLIC BLOOD PRESSURE: 62 MMHG | WEIGHT: 191.2 LBS | HEIGHT: 61 IN | HEART RATE: 78 BPM | OXYGEN SATURATION: 100 % | TEMPERATURE: 97.3 F | BODY MASS INDEX: 36.1 KG/M2

## 2021-07-30 DIAGNOSIS — M25.511 ACUTE PAIN OF BOTH SHOULDERS: Primary | ICD-10-CM

## 2021-07-30 DIAGNOSIS — E11.9 TYPE 2 DIABETES MELLITUS WITHOUT COMPLICATION, WITHOUT LONG-TERM CURRENT USE OF INSULIN (HCC): ICD-10-CM

## 2021-07-30 DIAGNOSIS — E03.9 ACQUIRED HYPOTHYROIDISM: Primary | ICD-10-CM

## 2021-07-30 DIAGNOSIS — M48.00 SPINAL STENOSIS, UNSPECIFIED SPINAL REGION: ICD-10-CM

## 2021-07-30 DIAGNOSIS — M15.0 PRIMARY GENERALIZED (OSTEO)ARTHRITIS: ICD-10-CM

## 2021-07-30 DIAGNOSIS — I48.0 PAROXYSMAL ATRIAL FIBRILLATION (HCC): ICD-10-CM

## 2021-07-30 DIAGNOSIS — M25.512 ACUTE PAIN OF BOTH SHOULDERS: Primary | ICD-10-CM

## 2021-07-30 DIAGNOSIS — E03.9 ACQUIRED HYPOTHYROIDISM: ICD-10-CM

## 2021-07-30 DIAGNOSIS — R19.7 DIARRHEA, UNSPECIFIED TYPE: ICD-10-CM

## 2021-07-30 PROBLEM — I50.42 CHRONIC COMBINED SYSTOLIC AND DIASTOLIC HEART FAILURE (HCC): Status: ACTIVE | Noted: 2021-07-30

## 2021-07-30 LAB
ALBUMIN SERPL-MCNC: 3.9 G/DL (ref 3.5–5.2)
ALBUMIN/GLOB SERPL: 1 G/DL
ALP SERPL-CCNC: 89 U/L (ref 39–117)
ALT SERPL W P-5'-P-CCNC: 12 U/L (ref 1–33)
ANION GAP SERPL CALCULATED.3IONS-SCNC: 9.9 MMOL/L (ref 5–15)
AST SERPL-CCNC: 18 U/L (ref 1–32)
BASOPHILS # BLD AUTO: 0.06 10*3/MM3 (ref 0–0.2)
BASOPHILS NFR BLD AUTO: 0.4 % (ref 0–1.5)
BILIRUB SERPL-MCNC: 0.3 MG/DL (ref 0–1.2)
BUN SERPL-MCNC: 24 MG/DL (ref 8–23)
BUN/CREAT SERPL: 26.7 (ref 7–25)
CALCIUM SPEC-SCNC: 9.2 MG/DL (ref 8.6–10.5)
CHLORIDE SERPL-SCNC: 98 MMOL/L (ref 98–107)
CHOLEST SERPL-MCNC: 173 MG/DL (ref 0–200)
CO2 SERPL-SCNC: 28.1 MMOL/L (ref 22–29)
CREAT SERPL-MCNC: 0.9 MG/DL (ref 0.57–1)
DEPRECATED RDW RBC AUTO: 48.6 FL (ref 37–54)
EOSINOPHIL # BLD AUTO: 0.61 10*3/MM3 (ref 0–0.4)
EOSINOPHIL NFR BLD AUTO: 4.4 % (ref 0.3–6.2)
ERYTHROCYTE [DISTWIDTH] IN BLOOD BY AUTOMATED COUNT: 14.1 % (ref 12.3–15.4)
ERYTHROCYTE [SEDIMENTATION RATE] IN BLOOD: 12 MM/HR (ref 0–30)
GFR SERPL CREATININE-BSD FRML MDRD: 60 ML/MIN/1.73
GLOBULIN UR ELPH-MCNC: 4 GM/DL
GLUCOSE SERPL-MCNC: 100 MG/DL (ref 65–99)
HCT VFR BLD AUTO: 46.8 % (ref 34–46.6)
HDLC SERPL-MCNC: 45 MG/DL (ref 40–60)
HGB BLD-MCNC: 14.7 G/DL (ref 12–15.9)
HOLD SPECIMEN: NORMAL
IMM GRANULOCYTES # BLD AUTO: 0.05 10*3/MM3 (ref 0–0.05)
IMM GRANULOCYTES NFR BLD AUTO: 0.4 % (ref 0–0.5)
LDLC SERPL CALC-MCNC: 95 MG/DL (ref 0–100)
LDLC/HDLC SERPL: 1.98 {RATIO}
LYMPHOCYTES # BLD AUTO: 2.02 10*3/MM3 (ref 0.7–3.1)
LYMPHOCYTES NFR BLD AUTO: 14.6 % (ref 19.6–45.3)
MCH RBC QN AUTO: 29.2 PG (ref 26.6–33)
MCHC RBC AUTO-ENTMCNC: 31.4 G/DL (ref 31.5–35.7)
MCV RBC AUTO: 93 FL (ref 79–97)
MONOCYTES # BLD AUTO: 0.96 10*3/MM3 (ref 0.1–0.9)
MONOCYTES NFR BLD AUTO: 7 % (ref 5–12)
NEUTROPHILS NFR BLD AUTO: 10.09 10*3/MM3 (ref 1.7–7)
NEUTROPHILS NFR BLD AUTO: 73.2 % (ref 42.7–76)
PLATELET # BLD AUTO: 411 10*3/MM3 (ref 140–450)
PMV BLD AUTO: 8.6 FL (ref 6–12)
POTASSIUM SERPL-SCNC: 5 MMOL/L (ref 3.5–5.2)
PROT SERPL-MCNC: 7.9 G/DL (ref 6–8.5)
RBC # BLD AUTO: 5.03 10*6/MM3 (ref 3.77–5.28)
SODIUM SERPL-SCNC: 136 MMOL/L (ref 136–145)
TRIGL SERPL-MCNC: 195 MG/DL (ref 0–150)
VLDLC SERPL-MCNC: 33 MG/DL (ref 5–40)
WBC # BLD AUTO: 13.79 10*3/MM3 (ref 3.4–10.8)

## 2021-07-30 PROCEDURE — 80061 LIPID PANEL: CPT | Performed by: FAMILY MEDICINE

## 2021-07-30 PROCEDURE — 85025 COMPLETE CBC W/AUTO DIFF WBC: CPT | Performed by: FAMILY MEDICINE

## 2021-07-30 PROCEDURE — 80053 COMPREHEN METABOLIC PANEL: CPT | Performed by: FAMILY MEDICINE

## 2021-07-30 PROCEDURE — 73030 X-RAY EXAM OF SHOULDER: CPT

## 2021-07-30 PROCEDURE — 84443 ASSAY THYROID STIM HORMONE: CPT | Performed by: FAMILY MEDICINE

## 2021-07-30 PROCEDURE — 85652 RBC SED RATE AUTOMATED: CPT | Performed by: FAMILY MEDICINE

## 2021-07-30 PROCEDURE — 99214 OFFICE O/P EST MOD 30 MIN: CPT | Performed by: FAMILY MEDICINE

## 2021-07-30 PROCEDURE — 83036 HEMOGLOBIN GLYCOSYLATED A1C: CPT | Performed by: FAMILY MEDICINE

## 2021-07-30 PROCEDURE — 36415 COLL VENOUS BLD VENIPUNCTURE: CPT | Performed by: FAMILY MEDICINE

## 2021-07-30 RX ORDER — SODIUM, POTASSIUM,MAG SULFATES 17.5-3.13G
354 SOLUTION, RECONSTITUTED, ORAL ORAL
COMMUNITY
Start: 2021-07-14 | End: 2021-07-30

## 2021-07-30 RX ORDER — ASCORBIC ACID 250 MG
250 TABLET ORAL DAILY
COMMUNITY
End: 2021-07-30

## 2021-07-30 RX ORDER — METHYLPREDNISOLONE 4 MG/1
TABLET ORAL
COMMUNITY
Start: 2021-06-23 | End: 2021-07-30

## 2021-07-30 RX ORDER — SACCHAROMYCES BOULARDII 250 MG
250 CAPSULE ORAL
COMMUNITY
End: 2021-07-30 | Stop reason: ALTCHOICE

## 2021-07-30 RX ORDER — HYDROCODONE BITARTRATE AND ACETAMINOPHEN 7.5; 325 MG/1; MG/1
1 TABLET ORAL EVERY 4 HOURS
COMMUNITY
End: 2021-07-30

## 2021-07-30 RX ORDER — GUAIFENESIN 600 MG/1
1200 TABLET, EXTENDED RELEASE ORAL
COMMUNITY

## 2021-07-30 RX ORDER — MAGNESIUM 30 MG
165 TABLET ORAL DAILY
COMMUNITY
End: 2021-07-30

## 2021-07-30 RX ORDER — MULTIPLE VITAMINS W/ MINERALS TAB 9MG-400MCG
1 TAB ORAL DAILY
COMMUNITY

## 2021-07-30 NOTE — PROGRESS NOTES
"Chief Complaint  Shoulder Pain    Subjective          Radha Arana presents to [facility]  History of Present Illness  Had rather recent onset bilat shoulder pain for about 2 weeks. Using Tylenol and Icy Hot. Finds that she has restricted ROM in the shoulders as a result.  Worse first thing in the morning.  Can hardly get clothes on.  Will radiate into the upper arms bilat. No associated CP.  Has chronic numbness in hands due to carpal tunnel.  Is seeing Dr Ansari for that issue. He had given her a steroid dose pack which helped, but shortly afterward is when she started having trouble with the shoulders.  She does state that she might be noticing a little bit of discomfort and weakness in her hips as well  She did stop the metformin and diarrhea improve.  Her BS have been higher.  BS was 190 this morning.   She says her BP this morning was 147/78 at her wrist.     Review of Systems     Objective   Vital Signs:   BP 98/62   Pulse 78   Temp 97.3 °F (36.3 °C) (Oral)   Ht 156.2 cm (61.5\")   Wt 86.7 kg (191 lb 3.2 oz)   SpO2 100% Comment: on 2lpm nc  BMI 35.55 kg/m²     Physical Exam     Result Review :                 Assessment and Plan    Diagnoses and all orders for this visit:    1. Acute pain of both shoulders (Primary)  Assessment & Plan:  I wonder if she might have PMR.  Will check sed rate.  Check x-ray as well rule out lesions.    Orders:  -     Sedimentation Rate  -     XR Shoulder 2+ View Bilateral (In Office)    2. Primary generalized (osteo)arthritis  Assessment & Plan:  Pain in shoulders could be from her OA.  We will see what x-ray looks like      3. Spinal stenosis, unspecified spinal region  Assessment & Plan:  If no other source and if symptoms persist consider spinal stenosis and work-up of shoulder pain      4. Type 2 diabetes mellitus without complication, without long-term current use of insulin (CMS/[facility])  Assessment & Plan:  Will check lab and see if we need " to get on something other than metformin.     Orders:  -     CBC & Differential  -     Comprehensive Metabolic Panel  -     Lipid Panel  -     Hemoglobin A1c    5. Acquired hypothyroidism  -     TSH    6. Paroxysmal atrial fibrillation (CMS/HCC)    7. Diarrhea, unspecified type  Assessment & Plan:  She will have colonoscopy Aug 20th.  Will also have her stop Mg, Colace, Omeprazole for now.         Follow Up   No follow-ups on file.  Patient was given instructions and counseling regarding her condition or for health maintenance advice. Please see specific information pulled into the AVS if appropriate.

## 2021-07-31 ENCOUNTER — TELEPHONE (OUTPATIENT)
Dept: FAMILY MEDICINE CLINIC | Age: 81
End: 2021-07-31

## 2021-07-31 LAB
HBA1C MFR BLD: 6.18 % (ref 4.8–5.6)
TSH SERPL DL<=0.05 MIU/L-ACNC: 1.24 UIU/ML (ref 0.27–4.2)

## 2021-08-02 ENCOUNTER — TELEPHONE (OUTPATIENT)
Dept: FAMILY MEDICINE CLINIC | Age: 81
End: 2021-08-02

## 2021-08-05 ENCOUNTER — TELEPHONE (OUTPATIENT)
Dept: FAMILY MEDICINE CLINIC | Age: 81
End: 2021-08-05

## 2021-08-05 NOTE — TELEPHONE ENCOUNTER
Caller: Radha Arana    Relationship to patient: Self    Best call back number: [phone]    Patient is needing: PATIENT CALLED AND REQUESTED A CALL BACK TO GO OVER HER MOST RECENT LABS.     PLEASE CALL AND ADVISE.

## 2021-08-09 NOTE — TELEPHONE ENCOUNTER
Spoke to pt, she states that she has had a cough since she got the covid vaccine and she is coughing up thick white sputum, no color to it.  She takes 2 mucinex every day and is asking if there is something else OTC she can try.  She says she just gets a tickle in her chest and has to cough really hard to get it up.  She is also asking if you want her back on her Metformin?  He A1c is 6.1.  she also states that you told her to stop her Omeprazole and she stopped it x5 days and her heartburn came back, so she restarted it.  She is asking if that is ok?

## 2021-08-13 DIAGNOSIS — M17.0 PRIMARY OSTEOARTHRITIS OF BOTH KNEES: ICD-10-CM

## 2021-08-13 NOTE — TELEPHONE ENCOUNTER
Caller: [facility] [place] - [address] [facility] [phone] [facility] [phone] FX    Relationship: Pharmacy    Best call back number:[phone]    Medication needed:   Requested Prescriptions     Pending Prescriptions Disp Refills   • gabapentin (NEURONTIN) 300 MG capsule 60 capsule 0     Sig: Take 1 capsule by mouth 2 (two) times a day.   ALSO: Metoprolol ER 50MG 1 DAILY    When do you need the refill by: ASAP    What additional details did the patient provide when requesting the medication:   REQUESTING CALL FOR CLARIFICATION DISCONTINUING OMEPRAZOLE AND METFORMIN     Does the patient have less than a 3 day supply:  [x] Yes  [] No    What is the patient's preferred pharmacy: [facility] [place] - [address] [facility] [phone] [facility] [phone] FX

## 2021-08-14 RX ORDER — GABAPENTIN 300 MG/1
300 CAPSULE ORAL 2 TIMES DAILY
Qty: 60 CAPSULE | Refills: 0 | Status: SHIPPED | OUTPATIENT
Start: 2021-08-14 | End: 2021-09-20 | Stop reason: SDUPTHER

## 2021-08-14 NOTE — TELEPHONE ENCOUNTER
Okay to get back on Omeprazole  Stay off of Metformin for now.   Mucinex is fine to use. Drink plenty of liquids.     mas

## 2021-08-17 DIAGNOSIS — I10 ESSENTIAL HYPERTENSION: Primary | ICD-10-CM

## 2021-08-17 RX ORDER — METOPROLOL SUCCINATE 50 MG/1
50 TABLET, EXTENDED RELEASE ORAL DAILY
Qty: 90 TABLET | Refills: 0 | Status: SHIPPED | OUTPATIENT
Start: 2021-08-17 | End: 2021-11-12 | Stop reason: SDUPTHER

## 2021-08-17 RX ORDER — METOPROLOL SUCCINATE 50 MG/1
50 TABLET, EXTENDED RELEASE ORAL DAILY
COMMUNITY
End: 2021-08-17 | Stop reason: SDUPTHER

## 2021-08-23 DIAGNOSIS — R19.7 DIARRHEA, UNSPECIFIED TYPE: ICD-10-CM

## 2021-08-25 ENCOUNTER — TELEPHONE (OUTPATIENT)
Dept: FAMILY MEDICINE CLINIC | Age: 81
End: 2021-08-25

## 2021-08-25 NOTE — TELEPHONE ENCOUNTER
Caller: Radha Arana    Relationship: Self    Best call back number: [phone]    What was the call regarding:   PATIENT WOULD LIKE A CALL BACK FROM THE NURSE LOMBARDI     PATIENT WOULD NOT STATE WHAT THE CALL WAS REGARDING     Do you require a callback: YES

## 2021-08-26 RX ORDER — MONTELUKAST SODIUM 4 MG/1
TABLET, CHEWABLE ORAL
Qty: 60 TABLET | Refills: 1 | Status: SHIPPED | OUTPATIENT
Start: 2021-08-26 | End: 2021-11-12 | Stop reason: SDUPTHER

## 2021-08-26 NOTE — TELEPHONE ENCOUNTER
Spoke to pt and daughter, pt had her colonoscopy yesterday and she has lymphocytic colitis.  She says that the GI put her on a medication for 2 months and see if it helps.  She will call me back to let me know the name of it

## 2021-08-27 ENCOUNTER — TELEPHONE (OUTPATIENT)
Dept: FAMILY MEDICINE CLINIC | Age: 81
End: 2021-08-27

## 2021-08-27 RX ORDER — OMEPRAZOLE 40 MG/1
40 CAPSULE, DELAYED RELEASE ORAL DAILY
Qty: 90 CAPSULE | Refills: 0 | Status: SHIPPED | OUTPATIENT
Start: 2021-08-27 | End: 2021-11-01 | Stop reason: SDUPTHER

## 2021-08-27 NOTE — TELEPHONE ENCOUNTER
Pt is asking for a refill on her omeprazole.  She tried to go without it for about 5-6 days and she started getting heartburn

## 2021-09-20 DIAGNOSIS — M17.0 PRIMARY OSTEOARTHRITIS OF BOTH KNEES: ICD-10-CM

## 2021-09-21 RX ORDER — METFORMIN HYDROCHLORIDE 500 MG/1
TABLET, EXTENDED RELEASE ORAL
OUTPATIENT
Start: 2021-09-21

## 2021-09-21 RX ORDER — GABAPENTIN 300 MG/1
300 CAPSULE ORAL 2 TIMES DAILY
Qty: 60 CAPSULE | Refills: 1 | Status: SHIPPED | OUTPATIENT
Start: 2021-09-21 | End: 2021-11-12 | Stop reason: SDUPTHER

## 2021-09-21 NOTE — TELEPHONE ENCOUNTER
Caller: [facility] - [place], [place] - [address] [facility] [address] - [phone]  - [phone] FX    Relationship: Pharmacy      Medication requested (name and dosage):   -gabapentin (NEURONTIN) 300 MG capsule   metFORMIN ER (GLUCOPHAGE-XR) 500 MG 24 hr tablet       Pharmacy where request should be sent: [facility] - [place], [place] - [address] [facility] [address] - [phone] [facility] [phone] FX  [phone]    Additional details provided by patient: ELIAZAR FROM [facility] CALLED ON BEHALF OF PATIENT TO GET SCRIPT FOR GABAPENTIN AND METFORMIN AND WAS INSISTENT TO SPEAK TO OFFICE, SAYS PATIENT NEEDS TO START MEDICATION TOMORROW.    Best call back number: [phone]    Does the patient have less than a 3 day supply:  [x] Yes  [] No    Anton Forte Rep   09/21/21 14:11 EDT

## 2021-09-21 NOTE — TELEPHONE ENCOUNTER
PATIENT STATES PHARMACY IS SENDING HER MEDICATION TO HER HOUSE IN THE MORNING AND SHE IS NEEDING SOMEONE TO SEND THE PRESCRIPTION IN SO SHE CAN GET HER GABAPENTIN     [facility] - [place], [place] - [address]  - [phone] [facility] [phone] FX     PLEASE CALL AND ADVISE PATIENT WHEN THIS HAS BEEN SENT [phone]

## 2021-09-24 RX ORDER — FUROSEMIDE 40 MG/1
40 TABLET ORAL DAILY
Qty: 90 TABLET | Refills: 0 | Status: SHIPPED | OUTPATIENT
Start: 2021-09-24 | End: 2022-01-11 | Stop reason: SDUPTHER

## 2021-10-13 RX ORDER — LEVOTHYROXINE SODIUM 0.03 MG/1
25 TABLET ORAL
Qty: 30 TABLET | Refills: 1 | Status: SHIPPED | OUTPATIENT
Start: 2021-10-13 | End: 2021-12-10 | Stop reason: SDUPTHER

## 2021-11-01 RX ORDER — OMEPRAZOLE 40 MG/1
40 CAPSULE, DELAYED RELEASE ORAL DAILY
Qty: 28 CAPSULE | Refills: 0 | Status: SHIPPED | OUTPATIENT
Start: 2021-11-01 | End: 2021-12-09 | Stop reason: SDUPTHER

## 2021-11-10 ENCOUNTER — LAB (OUTPATIENT)
Dept: LAB | Facility: HOSPITAL | Age: 81
End: 2021-11-10

## 2021-11-10 ENCOUNTER — OFFICE VISIT (OUTPATIENT)
Dept: FAMILY MEDICINE CLINIC | Age: 81
End: 2021-11-10

## 2021-11-10 VITALS
WEIGHT: 189.6 LBS | SYSTOLIC BLOOD PRESSURE: 128 MMHG | HEIGHT: 61 IN | DIASTOLIC BLOOD PRESSURE: 94 MMHG | HEART RATE: 110 BPM | OXYGEN SATURATION: 97 % | TEMPERATURE: 98 F | BODY MASS INDEX: 35.8 KG/M2

## 2021-11-10 DIAGNOSIS — E11.9 TYPE 2 DIABETES MELLITUS WITHOUT COMPLICATION, WITHOUT LONG-TERM CURRENT USE OF INSULIN (HCC): ICD-10-CM

## 2021-11-10 DIAGNOSIS — E03.9 ACQUIRED HYPOTHYROIDISM: ICD-10-CM

## 2021-11-10 DIAGNOSIS — M25.511 BILATERAL SHOULDER PAIN, UNSPECIFIED CHRONICITY: ICD-10-CM

## 2021-11-10 DIAGNOSIS — M15.0 PRIMARY GENERALIZED (OSTEO)ARTHRITIS: ICD-10-CM

## 2021-11-10 DIAGNOSIS — M48.00 SPINAL STENOSIS, UNSPECIFIED SPINAL REGION: ICD-10-CM

## 2021-11-10 DIAGNOSIS — M25.512 BILATERAL SHOULDER PAIN, UNSPECIFIED CHRONICITY: ICD-10-CM

## 2021-11-10 DIAGNOSIS — K52.832 LYMPHOCYTIC COLITIS: ICD-10-CM

## 2021-11-10 DIAGNOSIS — I48.0 PAROXYSMAL ATRIAL FIBRILLATION (HCC): ICD-10-CM

## 2021-11-10 DIAGNOSIS — Z79.899 LONG-TERM USE OF HIGH-RISK MEDICATION: ICD-10-CM

## 2021-11-10 DIAGNOSIS — Z00.00 MEDICARE ANNUAL WELLNESS VISIT, SUBSEQUENT: Primary | ICD-10-CM

## 2021-11-10 DIAGNOSIS — R42 DIZZINESS: ICD-10-CM

## 2021-11-10 LAB
ALBUMIN SERPL-MCNC: 3.6 G/DL (ref 3.5–5.2)
ALBUMIN/GLOB SERPL: 1.2 G/DL
ALP SERPL-CCNC: 78 U/L (ref 39–117)
ALT SERPL W P-5'-P-CCNC: 20 U/L (ref 1–33)
AMPHET+METHAMPHET UR QL: NEGATIVE
AMPHETAMINES UR QL: NEGATIVE
ANION GAP SERPL CALCULATED.3IONS-SCNC: 8.9 MMOL/L (ref 5–15)
AST SERPL-CCNC: 15 U/L (ref 1–32)
BARBITURATES UR QL SCN: NEGATIVE
BASOPHILS # BLD AUTO: 0.03 10*3/MM3 (ref 0–0.2)
BASOPHILS NFR BLD AUTO: 0.2 % (ref 0–1.5)
BENZODIAZ UR QL SCN: NEGATIVE
BILIRUB SERPL-MCNC: 0.2 MG/DL (ref 0–1.2)
BUN SERPL-MCNC: 21 MG/DL (ref 8–23)
BUN/CREAT SERPL: 24.4 (ref 7–25)
BUPRENORPHINE SERPL-MCNC: NEGATIVE NG/ML
CALCIUM SPEC-SCNC: 9.2 MG/DL (ref 8.6–10.5)
CANNABINOIDS SERPL QL: NEGATIVE
CHLORIDE SERPL-SCNC: 99 MMOL/L (ref 98–107)
CHOLEST SERPL-MCNC: 192 MG/DL (ref 0–200)
CO2 SERPL-SCNC: 29.1 MMOL/L (ref 22–29)
COCAINE UR QL: NEGATIVE
CREAT SERPL-MCNC: 0.86 MG/DL (ref 0.57–1)
DEPRECATED RDW RBC AUTO: 48.2 FL (ref 37–54)
EOSINOPHIL # BLD AUTO: 0.09 10*3/MM3 (ref 0–0.4)
EOSINOPHIL NFR BLD AUTO: 0.6 % (ref 0.3–6.2)
ERYTHROCYTE [DISTWIDTH] IN BLOOD BY AUTOMATED COUNT: 13.9 % (ref 12.3–15.4)
EXPIRATION DATE: NORMAL
GFR SERPL CREATININE-BSD FRML MDRD: 63 ML/MIN/1.73
GLOBULIN UR ELPH-MCNC: 3.1 GM/DL
GLUCOSE SERPL-MCNC: 103 MG/DL (ref 65–99)
HBA1C MFR BLD: 6.53 % (ref 4.8–5.6)
HCT VFR BLD AUTO: 46.6 % (ref 34–46.6)
HDLC SERPL-MCNC: 53 MG/DL (ref 40–60)
HGB BLD-MCNC: 15 G/DL (ref 12–15.9)
IMM GRANULOCYTES # BLD AUTO: 0.09 10*3/MM3 (ref 0–0.05)
IMM GRANULOCYTES NFR BLD AUTO: 0.6 % (ref 0–0.5)
LDLC SERPL CALC-MCNC: 112 MG/DL (ref 0–100)
LDLC/HDLC SERPL: 2.05 {RATIO}
LYMPHOCYTES # BLD AUTO: 1.6 10*3/MM3 (ref 0.7–3.1)
LYMPHOCYTES NFR BLD AUTO: 10.4 % (ref 19.6–45.3)
Lab: NORMAL
MCH RBC QN AUTO: 29.9 PG (ref 26.6–33)
MCHC RBC AUTO-ENTMCNC: 32.2 G/DL (ref 31.5–35.7)
MCV RBC AUTO: 93 FL (ref 79–97)
MDMA UR QL SCN: NEGATIVE
METHADONE UR QL SCN: NEGATIVE
MONOCYTES # BLD AUTO: 1.01 10*3/MM3 (ref 0.1–0.9)
MONOCYTES NFR BLD AUTO: 6.6 % (ref 5–12)
NEUTROPHILS NFR BLD AUTO: 12.53 10*3/MM3 (ref 1.7–7)
NEUTROPHILS NFR BLD AUTO: 81.6 % (ref 42.7–76)
OPIATES UR QL: NEGATIVE
OXYCODONE UR QL SCN: NEGATIVE
PCP UR QL SCN: NEGATIVE
PLATELET # BLD AUTO: 328 10*3/MM3 (ref 140–450)
PMV BLD AUTO: 8.1 FL (ref 6–12)
POTASSIUM SERPL-SCNC: 5.1 MMOL/L (ref 3.5–5.2)
PROT SERPL-MCNC: 6.7 G/DL (ref 6–8.5)
RBC # BLD AUTO: 5.01 10*6/MM3 (ref 3.77–5.28)
SODIUM SERPL-SCNC: 137 MMOL/L (ref 136–145)
TRIGL SERPL-MCNC: 153 MG/DL (ref 0–150)
TSH SERPL DL<=0.05 MIU/L-ACNC: 1.16 UIU/ML (ref 0.27–4.2)
VLDLC SERPL-MCNC: 27 MG/DL (ref 5–40)
WBC # BLD AUTO: 15.35 10*3/MM3 (ref 3.4–10.8)

## 2021-11-10 PROCEDURE — 36415 COLL VENOUS BLD VENIPUNCTURE: CPT

## 2021-11-10 PROCEDURE — 1159F MED LIST DOCD IN RCRD: CPT | Performed by: FAMILY MEDICINE

## 2021-11-10 PROCEDURE — 83036 HEMOGLOBIN GLYCOSYLATED A1C: CPT

## 2021-11-10 PROCEDURE — 80305 DRUG TEST PRSMV DIR OPT OBS: CPT | Performed by: FAMILY MEDICINE

## 2021-11-10 PROCEDURE — 99214 OFFICE O/P EST MOD 30 MIN: CPT | Performed by: FAMILY MEDICINE

## 2021-11-10 PROCEDURE — 85025 COMPLETE CBC W/AUTO DIFF WBC: CPT

## 2021-11-10 PROCEDURE — 80053 COMPREHEN METABOLIC PANEL: CPT

## 2021-11-10 PROCEDURE — 1170F FXNL STATUS ASSESSED: CPT | Performed by: FAMILY MEDICINE

## 2021-11-10 PROCEDURE — 80061 LIPID PANEL: CPT | Performed by: FAMILY MEDICINE

## 2021-11-10 PROCEDURE — G0439 PPPS, SUBSEQ VISIT: HCPCS | Performed by: FAMILY MEDICINE

## 2021-11-10 PROCEDURE — 84443 ASSAY THYROID STIM HORMONE: CPT | Performed by: FAMILY MEDICINE

## 2021-11-10 RX ORDER — BUDESONIDE 9 MG/1
1 TABLET, FILM COATED, EXTENDED RELEASE ORAL DAILY
COMMUNITY
Start: 2021-10-19 | End: 2022-05-04

## 2021-11-10 NOTE — PROGRESS NOTES
The ABCs of the Annual Wellness Visit  Subsequent Medicare Wellness Visit    Chief Complaint   Patient presents with   • Hypertension   • Hypothyroidism   • Medicare Wellness-subsequent      Subjective    History of Present Illness:  Radha Arana is a 81 y.o. female who presents for a Subsequent Medicare Wellness Visit.    The following portions of the patient's history were reviewed and   updated as appropriate: allergies, current medications, past family history, past medical history, past social history, past surgical history and problem list.    Compared to one year ago, the patient feels her physical   health is better.    Compared to one year ago, the patient feels her mental   health is better.    Recent Hospitalizations:  She was not admitted to the hospital during the last year.     Chronic/Acute Health Issues:  Here with her daughter, Honey.    Overall she is getting by pretty well.    She did have the colonoscopy which revealed microscopic colitis.  Was started on budesonide ER 9 mg daily.  She says that the bowel movements are better, but still somewhat loose.   She is off Metformin.  Her BS usually running 105 approx.   She did get injection in the right wrist for CTS, which helped and the shoulders seemed to improve as well.   She will see Cardiologist and Eye doctor tomorrow.   Reports that her head feels funny during the day, like walking around in a dream.  Seems to come on when she first stands up.    Her orthostatic blood pressure readings do show wide fluctuation 118/100 lying, 104/47 sitting, 128/94 standing.       Current Medical Providers:  Patient Care Team:  Rell Harmon MD as PCP - General (Family Medicine)    Outpatient Medications Prior to Visit   Medication Sig Dispense Refill   • albuterol sulfate  (90 Base) MCG/ACT inhaler Inhale 2 puffs Every 4 (Four) Hours As Needed.     • Budesonide ER 9 MG tablet sustained-release 24 hour Take 1 tablet by mouth Daily.     •  DULoxetine (CYMBALTA) 60 MG capsule Take 60 mg by mouth Daily.     • Eliquis 5 MG tablet tablet 5 mg Every 12 (Twelve) Hours.     • furosemide (LASIX) 40 MG tablet Take 1 tablet by mouth Daily for 90 days. 90 tablet 0   • guaiFENesin (MUCINEX) 600 MG 12 hr tablet Take 1,200 mg by mouth.     • ipratropium-albuterol (DUO-NEB) 0.5-2.5 mg/3 ml nebulizer Take 3 mL by nebulization Every 4 (Four) Hours As Needed.     • levothyroxine (SYNTHROID, LEVOTHROID) 25 MCG tablet Take 1 tablet by mouth Every Morning. 30 tablet 1   • lisinopril (PRINIVIL,ZESTRIL) 10 MG tablet Take 10 mg by mouth Daily.     • Melatonin 10 MG tablet Take 10 mg by mouth Every Night.     • multivitamin with minerals (multivitamin with minerals) tablet tablet Take 1 tablet by mouth Daily.     • omeprazole (priLOSEC) 40 MG capsule Take 1 capsule by mouth Daily. 28 capsule 0   • oxybutynin XL (DITROPAN XL) 15 MG 24 hr tablet Take 15 mg by mouth Daily.     • potassium chloride (K-DUR,KLOR-CON) 20 MEQ CR tablet Take 1 tablet by mouth 2 (Two) Times a Day. 180 tablet 0   • Probiotic Product (PROBIOTIC DAILY PO) Take  by mouth.     • colestipol (COLESTID) 1 g tablet TAKE ONE TABLET BY MOUTH TWICE DAILY AS NEEDED FOR diarrhea 60 tablet 1   • gabapentin (NEURONTIN) 300 MG capsule Take 1 capsule by mouth 2 (two) times a day. 60 capsule 1   • metoprolol succinate XL (TOPROL-XL) 50 MG 24 hr tablet Take 1 tablet by mouth Daily. 90 tablet 0   • metFORMIN ER (GLUCOPHAGE-XR) 500 MG 24 hr tablet Take 500 mg by mouth.       No facility-administered medications prior to visit.       No opioid medication identified on active medication list. I have reviewed chart for other potential  high risk medication/s and harmful drug interactions in the elderly.          Aspirin is not on active medication list.  Aspirin use is not indicated based on review of current medical condition/s. Risk of harm outweighs potential benefits.  .    Patient Active Problem List   Diagnosis   •  "Spinal stenosis   • Chronic combined systolic and diastolic heart failure (HCC)   • Paroxysmal atrial fibrillation (HCC)   • Primary generalized (osteo)arthritis   • Type 2 diabetes mellitus without complication, without long-term current use of insulin (HCC)   • Acute pain of both shoulders   • Diarrhea   • Acquired hypothyroidism   • Lymphocytic colitis   • Medicare annual wellness visit, subsequent   • Dizziness     Advance Care Planning  Advance Directive is not on file.  ACP discussion was held with the patient during this visit. Patient has an advance directive (not in EMR), copy requested.    Review of Systems   Constitutional: Negative for fatigue and fever.   Cardiovascular: Negative for chest pain, palpitations and leg swelling.   Gastrointestinal: Positive for diarrhea. Negative for constipation, nausea and vomiting.   Genitourinary: Negative for dysuria.   Psychiatric/Behavioral: Negative for behavioral problems, hallucinations and sleep disturbance.        Objective    Vitals:    11/10/21 1034 11/10/21 1131 11/10/21 1133 11/10/21 1137   BP: 111/64 118/100 104/47 128/94   BP Location:  Right arm Right arm Right arm   Patient Position:  Lying Sitting Standing   Pulse: 92 75 94 110   Temp: 98 °F (36.7 °C)      TempSrc: Oral      SpO2: 97%  Comment: 2 LPM via NC      Weight: 86 kg (189 lb 9.6 oz)      Height: 156.2 cm (61.5\")        BMI Readings from Last 1 Encounters:   11/10/21 35.25 kg/m²   BMI is above normal parameters. Recommendations include: nutrition counseling    Does the patient have evidence of cognitive impairment? No    Physical Exam  Vitals and nursing note reviewed.   Constitutional:       General: She is not in acute distress.     Appearance: Normal appearance. She is obese.   HENT:      Right Ear: Tympanic membrane and ear canal normal.      Left Ear: Tympanic membrane and ear canal normal.      Mouth/Throat:      Mouth: Mucous membranes are moist.      Pharynx: Oropharynx is clear. No " oropharyngeal exudate.   Eyes:      General: No scleral icterus.     Conjunctiva/sclera: Conjunctivae normal.   Neck:      Thyroid: No thyroid mass or thyroid tenderness.      Vascular: No carotid bruit.   Cardiovascular:      Rate and Rhythm: Normal rate and regular rhythm.      Heart sounds: No murmur heard.  No friction rub. No gallop.    Pulmonary:      Effort: No respiratory distress.      Breath sounds: No wheezing or rales.   Abdominal:      General: Bowel sounds are normal.      Palpations: Abdomen is soft. There is no mass.      Tenderness: There is no abdominal tenderness. There is no guarding or rebound.   Musculoskeletal:         General: No swelling.      Right lower leg: No edema.      Left lower leg: No edema.      Comments: Globular knee joints   Lymphadenopathy:      Cervical: No cervical adenopathy.   Skin:     Coloration: Skin is not jaundiced.      Findings: No lesion.   Neurological:      General: No focal deficit present.      Mental Status: She is alert and oriented to person, place, and time.   Psychiatric:         Mood and Affect: Mood normal.         Behavior: Behavior normal.         Thought Content: Thought content normal.         Judgment: Judgment normal.       Lab Results   Component Value Date    TRIG 153 (H) 11/10/2021    HDL 53 11/10/2021     (H) 11/10/2021    VLDL 27 11/10/2021    HGBA1C 6.53 (H) 11/10/2021            HEALTH RISK ASSESSMENT    Smoking Status:  Social History     Tobacco Use   Smoking Status Never Smoker   Smokeless Tobacco Never Used     Alcohol Consumption:  Social History     Substance and Sexual Activity   Alcohol Use Not Currently     Fall Risk Screen:    STEADI Fall Risk Assessment was completed, and patient is at MODERATE risk for falls. Assessment completed on:7/30/2021    Depression Screening:  PHQ-2/PHQ-9 Depression Screening 11/10/2021   Little interest or pleasure in doing things 1   Feeling down, depressed, or hopeless 0   Trouble falling or  staying asleep, or sleeping too much -   Feeling tired or having little energy -   Poor appetite or overeating -   Feeling bad about yourself - or that you are a failure or have let yourself or your family down -   Trouble concentrating on things, such as reading the newspaper or watching television -   Moving or speaking so slowly that other people could have noticed. Or the opposite - being so fidgety or restless that you have been moving around a lot more than usual -   Thoughts that you would be better off dead, or of hurting yourself in some way -   Total Score 1   If you checked off any problems, how difficult have these problems made it for you to do your work, take care of things at home, or get along with other people? -       Health Habits and Functional and Cognitive Screening:  Functional & Cognitive Status 11/10/2021   Do you have difficulty preparing food and eating? No   Do you have difficulty bathing yourself, getting dressed or grooming yourself? No   Do you have difficulty using the toilet? No   Do you have difficulty moving around from place to place? No   Do you have trouble with steps or getting out of a bed or a chair? No   Current Diet Well Balanced Diet   Dental Exam Other        Dental Exam Comment has dentures   Eye Exam Up to date        Eye Exam Comment eye exam tomorrow   Exercise (times per week) 7 times per week   Current Exercises Include Walking;House Cleaning   Do you need help using the phone?  No   Are you deaf or do you have serious difficulty hearing?  Yes   Do you need help with transportation? Yes   Do you need help shopping? No   Do you need help preparing meals?  No   Do you need help with housework?  No   Do you need help with laundry? No   Do you need help taking your medications? No   Do you need help managing money? No   Do you ever drive or ride in a car without wearing a seat belt? No   Have you felt unusual stress, anger or loneliness in the last month? No   Who do  you live with? Child   If you need help, do you have trouble finding someone available to you? No   Have you been bothered in the last four weeks by sexual problems? No   Do you have difficulty concentrating, remembering or making decisions? No       Age-appropriate Screening Schedule:  Refer to the list below for future screening recommendations based on patient's age, sex and/or medical conditions. Orders for these recommended tests are listed in the plan section. The patient has been provided with a written plan.    Health Maintenance   Topic Date Due   • ZOSTER VACCINE (2 of 3) 06/12/2015   • DIABETIC EYE EXAM  Never done   • DXA SCAN  11/20/2021   • URINE MICROALBUMIN  02/12/2022   • HEMOGLOBIN A1C  05/10/2022   • LIPID PANEL  11/10/2022   • TDAP/TD VACCINES (2 - Td or Tdap) 04/17/2025   • INFLUENZA VACCINE  Completed              Assessment/Plan   CMS Preventative Services Quick Reference  Risk Factors Identified During Encounter  Fall Risk-High or Moderate  Obesity/Overweight   The above risks/problems have been discussed with the patient.  Follow up actions/plans if indicated are seen below in the Assessment/Plan Section.  Pertinent information has been shared with the patient in the After Visit Summary.    Diagnoses and all orders for this visit:    1. Medicare annual wellness visit, subsequent (Primary)  Assessment & Plan:  We reviewed the preventive service recommendations and created an individualized handout      2. Type 2 diabetes mellitus without complication, without long-term current use of insulin (HCC)  Assessment & Plan:  She is no longer on the Metformin.  We will check lab and see if her sugars are staying under control.  Monitoring she does at home has looked good.  If the hemoglobin A1c has risen substantially then we would use something other than Metformin    Orders:  -     Comprehensive Metabolic Panel; Future  -     CBC Auto Differential; Future  -     Hemoglobin A1c; Future  -     Lipid  Panel    3. Acquired hypothyroidism  -     Comprehensive Metabolic Panel; Future  -     CBC Auto Differential; Future  -     TSH    4. Lymphocytic colitis  Assessment & Plan:  She was placed on the budesonide and reports some improvement in her loose stools but not total resolution.  He continues to take the Colestid as well.  They are not clear if there is scheduled follow-up with gastroenterology      Orders:  -     Comprehensive Metabolic Panel; Future  -     CBC Auto Differential; Future    5. Bilateral shoulder pain, unspecified chronicity  Comments:  Shoulder pain is reported to be improved.    6. Primary generalized (osteo)arthritis  Comments:  Continue on her current regimen.  Glad to say that she is no longer using the opioids.    7. Spinal stenosis, unspecified spinal region  Comments:  We can get her back in with pain management if necessary    8. Paroxysmal atrial fibrillation (HCC)  Comments:  Doing well on current management.  She is anticoagulated.  Has follow-up with cardiology.    9. Long-term use of high-risk medication  -     POC Urine Drug Screen [facility]  -     Comprehensive Metabolic Panel; Future  -     CBC Auto Differential; Future    10. Dizziness  Assessment & Plan:  She does have orthostatic blood pressure fluctuations which I suspect might be playing a role in her dizziness/lightheaded feeling.  She has appointment with cardiology tomorrow.  Honey made note of her orthostatic readings.  I will defer to cardiology on recommendations for medication change        Follow Up:   No follow-ups on file.     An After Visit Summary and PPPS were made available to the patient.

## 2021-11-10 NOTE — ASSESSMENT & PLAN NOTE
She was placed on the budesonide and reports some improvement in her loose stools but not total resolution.  He continues to take the Colestid as well.  They are not clear if there is scheduled follow-up with gastroenterology

## 2021-11-12 DIAGNOSIS — R19.7 DIARRHEA, UNSPECIFIED TYPE: ICD-10-CM

## 2021-11-12 DIAGNOSIS — I10 ESSENTIAL HYPERTENSION: ICD-10-CM

## 2021-11-12 DIAGNOSIS — M17.0 PRIMARY OSTEOARTHRITIS OF BOTH KNEES: ICD-10-CM

## 2021-11-12 RX ORDER — GABAPENTIN 300 MG/1
300 CAPSULE ORAL 2 TIMES DAILY
Qty: 60 CAPSULE | Refills: 1 | Status: SHIPPED | OUTPATIENT
Start: 2021-11-12 | End: 2022-01-03 | Stop reason: SDUPTHER

## 2021-11-12 RX ORDER — MONTELUKAST SODIUM 4 MG/1
1 TABLET, CHEWABLE ORAL 2 TIMES DAILY PRN
Qty: 60 TABLET | Refills: 1 | Status: SHIPPED | OUTPATIENT
Start: 2021-11-12 | End: 2022-01-03 | Stop reason: SDUPTHER

## 2021-11-12 RX ORDER — METOPROLOL SUCCINATE 50 MG/1
50 TABLET, EXTENDED RELEASE ORAL DAILY
Qty: 90 TABLET | Refills: 0 | Status: SHIPPED | OUTPATIENT
Start: 2021-11-12 | End: 2022-03-11 | Stop reason: SDUPTHER

## 2021-11-13 PROBLEM — R42 DIZZINESS: Status: ACTIVE | Noted: 2021-11-13

## 2021-11-13 NOTE — ASSESSMENT & PLAN NOTE
She does have orthostatic blood pressure fluctuations which I suspect might be playing a role in her dizziness/lightheaded feeling.  She has appointment with cardiology tomorrow.  Honey made note of her orthostatic readings.  I will defer to cardiology on recommendations for medication change

## 2021-11-13 NOTE — ASSESSMENT & PLAN NOTE
She is no longer on the Metformin.  We will check lab and see if her sugars are staying under control.  Monitoring she does at home has looked good.  If the hemoglobin A1c has risen substantially then we would use something other than Metformin

## 2021-12-09 RX ORDER — OMEPRAZOLE 40 MG/1
40 CAPSULE, DELAYED RELEASE ORAL DAILY
Qty: 28 CAPSULE | Refills: 0 | Status: SHIPPED | OUTPATIENT
Start: 2021-12-09 | End: 2022-02-07 | Stop reason: SDUPTHER

## 2021-12-10 RX ORDER — LEVOTHYROXINE SODIUM 0.03 MG/1
25 TABLET ORAL
Qty: 30 TABLET | Refills: 5 | Status: SHIPPED | OUTPATIENT
Start: 2021-12-10 | End: 2022-06-06 | Stop reason: SDUPTHER

## 2021-12-13 DIAGNOSIS — E87.6 HYPOKALEMIA: ICD-10-CM

## 2021-12-13 RX ORDER — POTASSIUM CHLORIDE 20 MEQ/1
20 TABLET, EXTENDED RELEASE ORAL 2 TIMES DAILY
Qty: 180 TABLET | Refills: 0 | Status: SHIPPED | OUTPATIENT
Start: 2021-12-13 | End: 2022-03-11 | Stop reason: SDUPTHER

## 2022-01-03 DIAGNOSIS — R19.7 DIARRHEA, UNSPECIFIED TYPE: ICD-10-CM

## 2022-01-03 DIAGNOSIS — M17.0 PRIMARY OSTEOARTHRITIS OF BOTH KNEES: ICD-10-CM

## 2022-01-04 RX ORDER — GABAPENTIN 300 MG/1
300 CAPSULE ORAL 2 TIMES DAILY
Qty: 60 CAPSULE | Refills: 1 | Status: SHIPPED | OUTPATIENT
Start: 2022-01-04 | End: 2022-03-11 | Stop reason: SDUPTHER

## 2022-01-04 RX ORDER — MONTELUKAST SODIUM 4 MG/1
1 TABLET, CHEWABLE ORAL 2 TIMES DAILY PRN
Qty: 60 TABLET | Refills: 1 | Status: SHIPPED | OUTPATIENT
Start: 2022-01-04 | End: 2022-02-21 | Stop reason: SDUPTHER

## 2022-01-12 RX ORDER — FUROSEMIDE 40 MG/1
40 TABLET ORAL DAILY
Qty: 90 TABLET | Refills: 0 | Status: SHIPPED | OUTPATIENT
Start: 2022-01-12 | End: 2022-04-12 | Stop reason: SDUPTHER

## 2022-02-07 RX ORDER — OXYBUTYNIN CHLORIDE 15 MG/1
15 TABLET, EXTENDED RELEASE ORAL DAILY
Qty: 90 TABLET | Refills: 1 | Status: SHIPPED | OUTPATIENT
Start: 2022-02-07 | End: 2022-08-12 | Stop reason: SDUPTHER

## 2022-02-07 RX ORDER — LISINOPRIL 10 MG/1
10 TABLET ORAL DAILY
Qty: 90 TABLET | Refills: 1 | Status: SHIPPED | OUTPATIENT
Start: 2022-02-07 | End: 2022-08-12 | Stop reason: SDUPTHER

## 2022-02-07 RX ORDER — OMEPRAZOLE 40 MG/1
40 CAPSULE, DELAYED RELEASE ORAL DAILY
Qty: 28 CAPSULE | Refills: 5 | Status: SHIPPED | OUTPATIENT
Start: 2022-02-07 | End: 2022-07-18 | Stop reason: SDUPTHER

## 2022-02-07 RX ORDER — DULOXETIN HYDROCHLORIDE 60 MG/1
60 CAPSULE, DELAYED RELEASE ORAL DAILY
Qty: 90 CAPSULE | Refills: 1 | Status: SHIPPED | OUTPATIENT
Start: 2022-02-07 | End: 2022-08-12 | Stop reason: SDUPTHER

## 2022-02-14 ENCOUNTER — TELEPHONE (OUTPATIENT)
Dept: FAMILY MEDICINE CLINIC | Age: 82
End: 2022-02-14

## 2022-02-14 NOTE — TELEPHONE ENCOUNTER
I looked back in the chart and dont see where it was ever changed to once daily.  I spoke to pt and she states that she has always taken it bid.  It appears that when the computer change over happened, it got put in as once daily.  Pt is asking if it can stay at bid?  She will need a new rx this week

## 2022-02-14 NOTE — TELEPHONE ENCOUNTER
Pharmacy Name: [facility] - [place], [place] - [address]  - [phone]  - [phone]      Pharmacy representative name: JAKE    Pharmacy representative phone number: [phone]    What medication are you calling in regards to: DULoxetine (CYMBALTA) 60 MG capsule    What question does the pharmacy have: JAKE FROM [facility] WOULD LIKE TO VERIFY IF THIS MEDICATION SHOULD ONLY BE ONCE DAILY NOW    Who is the provider that prescribed the medication: MARIA D CONTE

## 2022-02-15 NOTE — TELEPHONE ENCOUNTER
Lets keep it at once a day at least until she follows up to discuss further.  Less risk of adverse side effects at this dosage.

## 2022-02-16 ENCOUNTER — TELEPHONE (OUTPATIENT)
Dept: FAMILY MEDICINE CLINIC | Age: 82
End: 2022-02-16

## 2022-02-21 DIAGNOSIS — R19.7 DIARRHEA, UNSPECIFIED TYPE: ICD-10-CM

## 2022-02-21 RX ORDER — MONTELUKAST SODIUM 4 MG/1
1 TABLET, CHEWABLE ORAL 2 TIMES DAILY PRN
Qty: 60 TABLET | Refills: 0 | Status: SHIPPED | OUTPATIENT
Start: 2022-02-21 | End: 2022-07-19 | Stop reason: SDUPTHER

## 2022-03-11 DIAGNOSIS — M17.0 PRIMARY OSTEOARTHRITIS OF BOTH KNEES: ICD-10-CM

## 2022-03-11 DIAGNOSIS — E87.6 HYPOKALEMIA: ICD-10-CM

## 2022-03-11 DIAGNOSIS — I10 ESSENTIAL HYPERTENSION: ICD-10-CM

## 2022-03-11 RX ORDER — POTASSIUM CHLORIDE 20 MEQ/1
20 TABLET, EXTENDED RELEASE ORAL 2 TIMES DAILY
Qty: 180 TABLET | Refills: 0 | Status: SHIPPED | OUTPATIENT
Start: 2022-03-11 | End: 2022-06-06 | Stop reason: SDUPTHER

## 2022-03-11 RX ORDER — METOPROLOL SUCCINATE 50 MG/1
50 TABLET, EXTENDED RELEASE ORAL DAILY
Qty: 90 TABLET | Refills: 0 | Status: SHIPPED | OUTPATIENT
Start: 2022-03-11 | End: 2022-06-06 | Stop reason: SDUPTHER

## 2022-03-15 RX ORDER — GABAPENTIN 300 MG/1
300 CAPSULE ORAL 2 TIMES DAILY
Qty: 60 CAPSULE | Refills: 1 | Status: SHIPPED | OUTPATIENT
Start: 2022-03-15 | End: 2022-05-13 | Stop reason: SDUPTHER

## 2022-04-12 RX ORDER — FUROSEMIDE 40 MG/1
40 TABLET ORAL DAILY
Qty: 90 TABLET | Refills: 0 | Status: SHIPPED | OUTPATIENT
Start: 2022-04-12 | End: 2022-07-18 | Stop reason: SDUPTHER

## 2022-05-04 ENCOUNTER — OFFICE VISIT (OUTPATIENT)
Dept: FAMILY MEDICINE CLINIC | Age: 82
End: 2022-05-04

## 2022-05-04 ENCOUNTER — LAB (OUTPATIENT)
Dept: LAB | Facility: HOSPITAL | Age: 82
End: 2022-05-04

## 2022-05-04 VITALS
BODY MASS INDEX: 37.19 KG/M2 | TEMPERATURE: 98.6 F | DIASTOLIC BLOOD PRESSURE: 49 MMHG | OXYGEN SATURATION: 100 % | SYSTOLIC BLOOD PRESSURE: 101 MMHG | HEIGHT: 61 IN | WEIGHT: 197 LBS | HEART RATE: 72 BPM

## 2022-05-04 DIAGNOSIS — I48.0 PAROXYSMAL ATRIAL FIBRILLATION: ICD-10-CM

## 2022-05-04 DIAGNOSIS — R19.7 DIARRHEA, UNSPECIFIED TYPE: ICD-10-CM

## 2022-05-04 DIAGNOSIS — E11.9 TYPE 2 DIABETES MELLITUS WITHOUT COMPLICATION, WITHOUT LONG-TERM CURRENT USE OF INSULIN: ICD-10-CM

## 2022-05-04 DIAGNOSIS — M15.0 PRIMARY GENERALIZED (OSTEO)ARTHRITIS: ICD-10-CM

## 2022-05-04 DIAGNOSIS — D72.829 LEUKOCYTOSIS, UNSPECIFIED TYPE: ICD-10-CM

## 2022-05-04 DIAGNOSIS — E03.9 ACQUIRED HYPOTHYROIDISM: ICD-10-CM

## 2022-05-04 DIAGNOSIS — R06.2 WHEEZE: ICD-10-CM

## 2022-05-04 DIAGNOSIS — K52.832 LYMPHOCYTIC COLITIS: ICD-10-CM

## 2022-05-04 DIAGNOSIS — Z23 NEED FOR VACCINATION: ICD-10-CM

## 2022-05-04 DIAGNOSIS — I50.42 CHRONIC COMBINED SYSTOLIC AND DIASTOLIC HEART FAILURE: Primary | ICD-10-CM

## 2022-05-04 LAB
ALBUMIN SERPL-MCNC: 3.7 G/DL (ref 3.5–5.2)
ALBUMIN UR-MCNC: <1.2 MG/DL
ALBUMIN/GLOB SERPL: 1.1 G/DL
ALP SERPL-CCNC: 79 U/L (ref 39–117)
ALT SERPL W P-5'-P-CCNC: 19 U/L (ref 1–33)
ANION GAP SERPL CALCULATED.3IONS-SCNC: 13.5 MMOL/L (ref 5–15)
AST SERPL-CCNC: 19 U/L (ref 1–32)
BASOPHILS # BLD AUTO: 0.06 10*3/MM3 (ref 0–0.2)
BASOPHILS NFR BLD AUTO: 0.4 % (ref 0–1.5)
BILIRUB SERPL-MCNC: 0.3 MG/DL (ref 0–1.2)
BUN SERPL-MCNC: 21 MG/DL (ref 8–23)
BUN/CREAT SERPL: 22.6 (ref 7–25)
CALCIUM SPEC-SCNC: 9.5 MG/DL (ref 8.6–10.5)
CHLORIDE SERPL-SCNC: 97 MMOL/L (ref 98–107)
CHOLEST SERPL-MCNC: 211 MG/DL (ref 0–200)
CO2 SERPL-SCNC: 25.5 MMOL/L (ref 22–29)
CREAT SERPL-MCNC: 0.93 MG/DL (ref 0.57–1)
CREAT UR-MCNC: 26.6 MG/DL
DEPRECATED RDW RBC AUTO: 49.6 FL (ref 37–54)
EGFRCR SERPLBLD CKD-EPI 2021: 60.7 ML/MIN/1.73
EOSINOPHIL # BLD AUTO: 0.24 10*3/MM3 (ref 0–0.4)
EOSINOPHIL NFR BLD AUTO: 1.4 % (ref 0.3–6.2)
ERYTHROCYTE [DISTWIDTH] IN BLOOD BY AUTOMATED COUNT: 13.7 % (ref 12.3–15.4)
GLOBULIN UR ELPH-MCNC: 3.4 GM/DL
GLUCOSE SERPL-MCNC: 106 MG/DL (ref 65–99)
HBA1C MFR BLD: 6.9 % (ref 4.8–5.6)
HCT VFR BLD AUTO: 47.5 % (ref 34–46.6)
HDLC SERPL-MCNC: 51 MG/DL (ref 40–60)
HGB BLD-MCNC: 14.8 G/DL (ref 12–15.9)
IMM GRANULOCYTES # BLD AUTO: 0.14 10*3/MM3 (ref 0–0.05)
IMM GRANULOCYTES NFR BLD AUTO: 0.8 % (ref 0–0.5)
LDLC SERPL CALC-MCNC: 137 MG/DL (ref 0–100)
LDLC/HDLC SERPL: 2.63 {RATIO}
LYMPHOCYTES # BLD AUTO: 2.19 10*3/MM3 (ref 0.7–3.1)
LYMPHOCYTES NFR BLD AUTO: 13 % (ref 19.6–45.3)
MCH RBC QN AUTO: 30.4 PG (ref 26.6–33)
MCHC RBC AUTO-ENTMCNC: 31.2 G/DL (ref 31.5–35.7)
MCV RBC AUTO: 97.5 FL (ref 79–97)
MICROALBUMIN/CREAT UR: NORMAL MG/G{CREAT}
MONOCYTES # BLD AUTO: 1.17 10*3/MM3 (ref 0.1–0.9)
MONOCYTES NFR BLD AUTO: 6.9 % (ref 5–12)
NEUTROPHILS NFR BLD AUTO: 13.09 10*3/MM3 (ref 1.7–7)
NEUTROPHILS NFR BLD AUTO: 77.5 % (ref 42.7–76)
PLATELET # BLD AUTO: 362 10*3/MM3 (ref 140–450)
PMV BLD AUTO: 8.3 FL (ref 6–12)
POTASSIUM SERPL-SCNC: 4.9 MMOL/L (ref 3.5–5.2)
PROT SERPL-MCNC: 7.1 G/DL (ref 6–8.5)
RBC # BLD AUTO: 4.87 10*6/MM3 (ref 3.77–5.28)
SODIUM SERPL-SCNC: 136 MMOL/L (ref 136–145)
TRIGL SERPL-MCNC: 129 MG/DL (ref 0–150)
TSH SERPL DL<=0.05 MIU/L-ACNC: 1.27 UIU/ML (ref 0.27–4.2)
VLDLC SERPL-MCNC: 23 MG/DL (ref 5–40)
WBC NRBC COR # BLD: 16.89 10*3/MM3 (ref 3.4–10.8)

## 2022-05-04 PROCEDURE — 83615 LACTATE (LD) (LDH) ENZYME: CPT | Performed by: FAMILY MEDICINE

## 2022-05-04 PROCEDURE — 82043 UR ALBUMIN QUANTITATIVE: CPT | Performed by: FAMILY MEDICINE

## 2022-05-04 PROCEDURE — 84443 ASSAY THYROID STIM HORMONE: CPT | Performed by: FAMILY MEDICINE

## 2022-05-04 PROCEDURE — 36415 COLL VENOUS BLD VENIPUNCTURE: CPT | Performed by: FAMILY MEDICINE

## 2022-05-04 PROCEDURE — 83036 HEMOGLOBIN GLYCOSYLATED A1C: CPT | Performed by: FAMILY MEDICINE

## 2022-05-04 PROCEDURE — 80061 LIPID PANEL: CPT | Performed by: FAMILY MEDICINE

## 2022-05-04 PROCEDURE — 91305 COVID-19 (PFIZER) 12+ YRS: CPT | Performed by: FAMILY MEDICINE

## 2022-05-04 PROCEDURE — 80053 COMPREHEN METABOLIC PANEL: CPT | Performed by: FAMILY MEDICINE

## 2022-05-04 PROCEDURE — 0054A COVID-19 (PFIZER) 12+ YRS: CPT | Performed by: FAMILY MEDICINE

## 2022-05-04 PROCEDURE — 82570 ASSAY OF URINE CREATININE: CPT | Performed by: FAMILY MEDICINE

## 2022-05-04 PROCEDURE — 84550 ASSAY OF BLOOD/URIC ACID: CPT | Performed by: FAMILY MEDICINE

## 2022-05-04 PROCEDURE — 99214 OFFICE O/P EST MOD 30 MIN: CPT | Performed by: FAMILY MEDICINE

## 2022-05-04 PROCEDURE — 85025 COMPLETE CBC W/AUTO DIFF WBC: CPT | Performed by: FAMILY MEDICINE

## 2022-05-04 RX ORDER — ALBUTEROL SULFATE 90 UG/1
2 AEROSOL, METERED RESPIRATORY (INHALATION) EVERY 4 HOURS PRN
Qty: 18 G | Refills: 0 | Status: SHIPPED | OUTPATIENT
Start: 2022-05-04

## 2022-05-04 NOTE — PROGRESS NOTES
Chief Complaint  Diabetes (6 month follow up) and Hypertension    Subjective          Radha Arana presents to [facility]  History of Present Illness   JERROD  Radha Arana is an 82-year-old who presents today for follow-up. She is accompanied by her daughter, Honey Hernandez.    The patient questions why she is taking colestipol. She states that the bottle says to take as needed, but she does not know what it is for. The patient and her daughter confirm that the patient had a colonoscopy by Dr. Mat Doll, who prescribed a steroid type medication for inflammation. Miss Hernandez states that Dr. Doll told the patient she does not need to see him again unless she has problems again.    The patient states that she looks like she has been beaten up because she has so many bruises from bumping into doors.     The patient confirms that she saw her cardiologist, Dr. Bolton, on 03/15/2022. She denies having more issues with dizziness. She states that Dr. Bolton advised her to get a COVID-19 booster. Miss Hernandez asks if it is alright that she had the Moderna vaccination, and today we will give her a Pfizer booster.    Miss Hernandez confirms that the patient has an appointment with her pulmonologist, Dr. Gupta, on 05/06/2022.    The patient states that her joints have not been painful. She states her back has been bothering her a bit. She reports that she is no longer seeing pain management, and went off of her hydrocodone. The patient reports that she takes Tylenol when she remembers.     The patient reports that she had an episode of pain around her lower rib cage, bilaterally. She took some Pepto Bismol, and it went away.    The patient and her daughter report that she has an eye exam scheduled for 09/2022. They report that she goes every 6 months.    The patient states she has had trouble with environmental allergies and asks if there is something that works like Allegra. She states that Allegra  worked really well for her, but after it went over-the-counter, it became too costly.     Current Outpatient Medications   Medication Sig Dispense Refill   • albuterol sulfate  (90 Base) MCG/ACT inhaler Inhale 2 puffs Every 4 (Four) Hours As Needed for Wheezing or Shortness of Air. 18 g 0   • colestipol (COLESTID) 1 g tablet Take 1 tablet by mouth 2 (Two) Times a Day As Needed (diarrhea). 60 tablet 0   • DULoxetine (CYMBALTA) 60 MG capsule Take 1 capsule by mouth Daily. 90 capsule 1   • Eliquis 5 MG tablet tablet 5 mg Every 12 (Twelve) Hours.     • furosemide (LASIX) 40 MG tablet Take 1 tablet by mouth Daily. 90 tablet 0   • gabapentin (NEURONTIN) 300 MG capsule Take 1 capsule by mouth 2 (Two) Times a Day. 60 capsule 1   • guaiFENesin (MUCINEX) 600 MG 12 hr tablet Take 1,200 mg by mouth.     • ipratropium-albuterol (DUO-NEB) 0.5-2.5 mg/3 ml nebulizer Take 3 mL by nebulization Every 4 (Four) Hours As Needed.     • levothyroxine (SYNTHROID, LEVOTHROID) 25 MCG tablet Take 1 tablet by mouth Every Morning. 30 tablet 5   • lisinopril (PRINIVIL,ZESTRIL) 10 MG tablet Take 1 tablet by mouth Daily. 90 tablet 1   • Melatonin 10 MG tablet Take 10 mg by mouth Every Night.     • metoprolol succinate XL (TOPROL-XL) 50 MG 24 hr tablet Take 1 tablet by mouth Daily. 90 tablet 0   • multivitamin with minerals tablet tablet Take 1 tablet by mouth Daily.     • omeprazole (priLOSEC) 40 MG capsule Take 1 capsule by mouth Daily. 28 capsule 5   • oxybutynin XL (DITROPAN XL) 15 MG 24 hr tablet Take 1 tablet by mouth Daily. 90 tablet 1   • potassium chloride (K-DUR,KLOR-CON) 20 MEQ CR tablet Take 1 tablet by mouth 2 (Two) Times a Day. 180 tablet 0   • Probiotic Product (PROBIOTIC DAILY PO) Take  by mouth.       No current facility-administered medications for this visit.       Review of Systems   Constitutional: Negative for chills and fever.   Respiratory: Negative for chest tightness and shortness of breath.    Cardiovascular:  "Negative for chest pain.   Gastrointestinal: Negative for abdominal pain, diarrhea, nausea and vomiting.   Genitourinary: Negative for dysuria and hematuria.   Neurological: Negative for headache and confusion.   Psychiatric/Behavioral: Negative for agitation, hallucinations and suicidal ideas.            Objective   Vital Signs:   /49 (BP Location: Left arm, Patient Position: Sitting)   Pulse 72   Temp 98.6 °F (37 °C) (Oral)   Ht 156.2 cm (61.5\")   Wt 89.4 kg (197 lb)   SpO2 100%   BMI 36.62 kg/m²     Physical Exam  Constitutional:       Appearance: Normal appearance. She is obese.   Neck:      Vascular: No carotid bruit.   Cardiovascular:      Rate and Rhythm: Normal rate. Rhythm irregular.      Heart sounds: Normal heart sounds. No murmur heard.  Pulmonary:      Effort: No respiratory distress.      Breath sounds: No wheezing or rales.   Musculoskeletal:      Right lower leg: No edema.      Left lower leg: No edema.   Lymphadenopathy:      Cervical: No cervical adenopathy.   Neurological:      General: No focal deficit present.      Mental Status: She is alert.   Psychiatric:         Attention and Perception: Attention normal.         Mood and Affect: Mood normal.         Speech: Speech normal.            Result Review :                     Assessment and Plan    Diagnoses and all orders for this visit:    1. Chronic combined systolic and diastolic heart failure (HCC) (Primary)  Comments:  Doing really well with her heart failure.  Well compensated continue current regimen    2. Paroxysmal atrial fibrillation (HCC)  Comments:  Asymptomatic.  Anticoagulated.  Continue on current regimen  Orders:  -     CBC Auto Differential    3. Type 2 diabetes mellitus without complication, without long-term current use of insulin (HCC)  Comments:  We will check lab and predicate medication change based on those results  Orders:  -     Microalbumin / Creatinine Urine Ratio - Urine, Clean Catch  -     Hemoglobin " A1c  -     Lipid Panel  -     Comprehensive Metabolic Panel  -     CBC Auto Differential    4. Acquired hypothyroidism  Comments:  Likewise check lab predicate medication change based on results  Orders:  -     TSH    5. Primary generalized (osteo)arthritis  Comments:  Seems to be doing pretty well.  She knows she can use Tylenol for symptom relief scheduled dose might be superior than as needed use    6. Diarrhea, unspecified type    7. Lymphocytic colitis  Comments:  If she develops worsening symptomatology she can let me know we will get in touch with GI to discuss further follow-up    8. Need for vaccination  -     COVID-19 Vaccine (Pfizer) Gray Cap    9. Wheeze  -     albuterol sulfate  (90 Base) MCG/ACT inhaler; Inhale 2 puffs Every 4 (Four) Hours As Needed for Wheezing or Shortness of Air.  Dispense: 18 g; Refill: 0    1. Arthritis  Comments:  Explained to the patient that taking Tylenol on a scheduled basis may be better for her arthritic pain, rather than taking it episodically.    2. Diarrhea  Comments:  I concur with the patient's regimen for her colestipol, and described when she may want to take more or less of it.    3. Bruising to arms  Comments:  Described to the patient that cushioned sleeves are available via [employer].    4. Chronic combined systolic and diastolic heart failure  Comments:  Patient will continue following with Dr. Bolton.    5. Paroxysmal atrial fibrillation  Comments:  Patient will continue following with Dr. Bolton.    6. Need for vaccination  Comments:  Will administer COVID-19 booster today.    7. Wheezing  Comments:  Patient will continue following with Dr. Gupta.    8. Environmental allergies  Comments:  Advised the patient the look for the generic version of an over-the-counter allergy medication.            Follow Up   No follow-ups on file.  Patient was given instructions and counseling regarding her condition or for health maintenance advice. Please see specific  information pulled into the AVS if appropriate.     Transcribed from ambient dictation for Rell Harmon MD by Coleen Weems.  05/04/22   13:53 EDT    Patient verbalized consent to the visit recording.

## 2022-05-05 DIAGNOSIS — D72.829 LEUKOCYTOSIS, UNSPECIFIED TYPE: Primary | ICD-10-CM

## 2022-05-05 LAB
LDH SERPL-CCNC: 256 U/L (ref 135–214)
URATE SERPL-MCNC: 7.3 MG/DL (ref 2.4–5.7)

## 2022-05-10 DIAGNOSIS — D72.829 LEUKOCYTOSIS, UNSPECIFIED TYPE: Primary | ICD-10-CM

## 2022-05-13 DIAGNOSIS — M17.0 PRIMARY OSTEOARTHRITIS OF BOTH KNEES: ICD-10-CM

## 2022-05-13 RX ORDER — GABAPENTIN 300 MG/1
300 CAPSULE ORAL 2 TIMES DAILY
Qty: 60 CAPSULE | Refills: 1 | Status: SHIPPED | OUTPATIENT
Start: 2022-05-13 | End: 2022-07-07

## 2022-06-06 DIAGNOSIS — E87.6 HYPOKALEMIA: ICD-10-CM

## 2022-06-06 DIAGNOSIS — I10 ESSENTIAL HYPERTENSION: ICD-10-CM

## 2022-06-06 RX ORDER — LEVOTHYROXINE SODIUM 0.03 MG/1
25 TABLET ORAL
Qty: 30 TABLET | Refills: 5 | Status: SHIPPED | OUTPATIENT
Start: 2022-06-06 | End: 2022-12-06

## 2022-06-06 RX ORDER — METOPROLOL SUCCINATE 50 MG/1
50 TABLET, EXTENDED RELEASE ORAL DAILY
Qty: 90 TABLET | Refills: 1 | Status: SHIPPED | OUTPATIENT
Start: 2022-06-06 | End: 2022-12-06

## 2022-06-06 RX ORDER — POTASSIUM CHLORIDE 20 MEQ/1
20 TABLET, EXTENDED RELEASE ORAL 2 TIMES DAILY
Qty: 180 TABLET | Refills: 1 | Status: SHIPPED | OUTPATIENT
Start: 2022-06-06 | End: 2022-12-06

## 2022-06-20 ENCOUNTER — OFFICE VISIT (OUTPATIENT)
Dept: FAMILY MEDICINE CLINIC | Age: 82
End: 2022-06-20

## 2022-06-20 VITALS
WEIGHT: 199.4 LBS | TEMPERATURE: 97.9 F | DIASTOLIC BLOOD PRESSURE: 74 MMHG | BODY MASS INDEX: 37.65 KG/M2 | SYSTOLIC BLOOD PRESSURE: 116 MMHG | OXYGEN SATURATION: 89 % | HEART RATE: 161 BPM | HEIGHT: 61 IN

## 2022-06-20 DIAGNOSIS — E11.9 TYPE 2 DIABETES MELLITUS WITHOUT COMPLICATION, WITHOUT LONG-TERM CURRENT USE OF INSULIN: ICD-10-CM

## 2022-06-20 DIAGNOSIS — S81.812A SKIN TEAR OF LEFT LOWER LEG WITHOUT COMPLICATION, INITIAL ENCOUNTER: Primary | ICD-10-CM

## 2022-06-20 DIAGNOSIS — S81.811A SKIN TEAR OF RIGHT LOWER LEG WITHOUT COMPLICATION, INITIAL ENCOUNTER: ICD-10-CM

## 2022-06-20 PROCEDURE — 99214 OFFICE O/P EST MOD 30 MIN: CPT | Performed by: PHYSICIAN ASSISTANT

## 2022-06-20 PROCEDURE — 12002 RPR S/N/AX/GEN/TRNK2.6-7.5CM: CPT | Performed by: PHYSICIAN ASSISTANT

## 2022-06-20 RX ORDER — CEPHALEXIN 500 MG/1
500 CAPSULE ORAL 3 TIMES DAILY
Qty: 15 CAPSULE | Refills: 0 | Status: SHIPPED | OUTPATIENT
Start: 2022-06-20 | End: 2022-06-25

## 2022-06-20 NOTE — PROGRESS NOTES
Subjective     CHIEF COMPLAINT    Chief Complaint   Patient presents with   • Fall     Pt states she fell going up concrete steps on Sunday. Cuts and bruises on both shins.             This is an 82-year-old female presented clinic complaining of lower leg injury yesterday.  She was walking up the steps slipped and scraped her shins.  At that time, family members cleaned with over-the-counter cleanser and applied antibiotic ointment with nonstick gauze and Coban.  She does take Eliquis daily but is not sure if her legs are still bleeding.  She states she has not taken the bandage off since they were put on yesterday.  Otherwise feeling well with no fevers, chills, or body aches.            Review of Systems   Constitutional: Negative for chills and fever.   Gastrointestinal: Negative for nausea and vomiting.   Musculoskeletal: Negative for myalgias.   Skin: Positive for wound.            Past Medical History:   Diagnosis Date   • Asthma     mild severity; as child, still sometimes needs albuterol;    • Atrial fibrillation (HCC)    • Bilateral primary osteoarthritis of knee    • Chronic back pain     affecting the low back;    • Chronic combined systolic (congestive) and diastolic (congestive) heart failure (HCC)    • Condition not found 10/2010    bilateral sacrililiac injection   • Depression    • GERD (gastroesophageal reflux disease)    • Goiter    • Hyperlipidemia    • Hypertension    • Hypothyroidism    • Local infection of the skin and subcutaneous tissue, unspecified    • Long term (current) use of anticoagulants    • Low back pain    • Morbid (severe) obesity due to excess calories (HCC)    • Nasal mucositis (ulcerative)    • 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    • Obesities, morbid (HCC)    • Obstructive sleep apnea (adult) (pediatric)    • Optic neuritis     with depth perception dysfunction    • Other specified disorders of bone density and structure, unspecified site    •  Paroxysmal atrial fibrillation (HCC)    • Pneumonia     last admit date 1/5/2016   • Postprocedural hypothyroidism    • Primary generalized (osteo)arthritis    • Renal stones    • S/P epidural steroid injection 2007    x2   • Sleep apnea     uses CPAP;    • Spinal stenosis, site unspecified    • Tachycardia, unspecified    • Type 2 diabetes mellitus without complications (HCC)    • Unspecified voice and resonance disorder    • Vitamin D deficiency, unspecified             Past Surgical History:   Procedure Laterality Date   • BLADDER SUSPENSION     • CARPAL TUNNEL RELEASE  06/2018   • CHOLECYSTECTOMY      at age 63   • HYSTERECTOMY      at age 38   • THYROIDECTOMY, PARTIAL              Family History   Problem Relation Age of Onset   • Alzheimer's disease Mother         cause of death   • Heart attack Father    • Other Sister         pacemaker   • Allergies Sister         one sister            Social History     Socioeconomic History   • Marital status:    Tobacco Use   • Smoking status: Never Smoker   • Smokeless tobacco: Never Used   Vaping Use   • Vaping Use: Never used   Substance and Sexual Activity   • Alcohol use: Not Currently   • Drug use: Never   • Sexual activity: Defer            Allergies   Allergen Reactions   • Morphine Rash            Current Outpatient Medications on File Prior to Visit   Medication Sig Dispense Refill   • albuterol sulfate  (90 Base) MCG/ACT inhaler Inhale 2 puffs Every 4 (Four) Hours As Needed for Wheezing or Shortness of Air. 18 g 0   • colestipol (COLESTID) 1 g tablet Take 1 tablet by mouth 2 (Two) Times a Day As Needed (diarrhea). 60 tablet 0   • DULoxetine (CYMBALTA) 60 MG capsule Take 1 capsule by mouth Daily. 90 capsule 1   • Eliquis 5 MG tablet tablet 5 mg Every 12 (Twelve) Hours.     • furosemide (LASIX) 40 MG tablet Take 1 tablet by mouth Daily. 90 tablet 0   • gabapentin (NEURONTIN) 300 MG capsule Take 1 capsule by mouth 2 (Two) Times a Day. 60 capsule 1  "  • guaiFENesin (MUCINEX) 600 MG 12 hr tablet Take 1,200 mg by mouth.     • ipratropium-albuterol (DUO-NEB) 0.5-2.5 mg/3 ml nebulizer Take 3 mL by nebulization Every 4 (Four) Hours As Needed.     • levothyroxine (SYNTHROID, LEVOTHROID) 25 MCG tablet Take 1 tablet by mouth Every Morning. 30 tablet 5   • lisinopril (PRINIVIL,ZESTRIL) 10 MG tablet Take 1 tablet by mouth Daily. 90 tablet 1   • Melatonin 10 MG tablet Take 10 mg by mouth Every Night.     • metoprolol succinate XL (TOPROL-XL) 50 MG 24 hr tablet Take 1 tablet by mouth Daily. 90 tablet 1   • multivitamin with minerals tablet tablet Take 1 tablet by mouth Daily.     • omeprazole (priLOSEC) 40 MG capsule Take 1 capsule by mouth Daily. 28 capsule 5   • oxybutynin XL (DITROPAN XL) 15 MG 24 hr tablet Take 1 tablet by mouth Daily. 90 tablet 1   • potassium chloride (K-DUR,KLOR-CON) 20 MEQ CR tablet Take 1 tablet by mouth 2 (Two) Times a Day. 180 tablet 1   • Probiotic Product (PROBIOTIC DAILY PO) Take  by mouth.       No current facility-administered medications on file prior to visit.            /74 (BP Location: Right arm, Patient Position: Sitting, Cuff Size: Adult)   Pulse (!) 161   Temp 97.9 °F (36.6 °C) (Oral)   Ht 156.2 cm (61.5\")   Wt 90.4 kg (199 lb 6.4 oz)   SpO2 (!) 89% Comment: room air  BMI 37.07 kg/m²          Objective     Physical Exam  Vitals and nursing note reviewed.   Constitutional:       General: She is not in acute distress.     Appearance: Normal appearance.   Pulmonary:      Effort: Pulmonary effort is normal. No respiratory distress.   Skin:     General: Skin is warm and dry.      Findings: Ecchymosis and wound (Skin tears bilateral anterior lower legs) present.   Neurological:      Mental Status: She is alert and oriented to person, place, and time.   Psychiatric:         Mood and Affect: Mood normal.         Behavior: Behavior normal.              Laceration Repair    Date/Time: 6/20/2022 2:36 PM  Performed by: Shelly Price " EMILIANO Corrales  Authorized by: Shelly Price PA-C   Body area: lower extremity  Location details: right lower leg  Laceration length: 5.5 (skin tear) cm  Foreign bodies: no foreign bodies  Tendon involvement: none  Nerve involvement: none  Vascular damage: no    Anesthesia:  Anesthetic total: 0 mL    Sedation:  Patient sedated: no    Amount of cleaning: standard  Debridement: none  Skin closure: Steri-Strips  Dressing: gauze roll (non-adhesive 4x3 gauze, kerlex)  Patient tolerance: patient tolerated the procedure well with no immediate complications  Comments: Skin tear: cleaned with sterile saline; skin edges re-approximated and tacked down with steri strips.    Laceration Repair    Date/Time: 6/20/2022 2:38 PM  Performed by: Shelly Price PA-C  Authorized by: Shelly Price PA-C   Body area: lower extremity  Location details: left lower leg  Laceration length: 2 (skin tear) cm  Foreign bodies: no foreign bodies  Tendon involvement: none  Nerve involvement: none  Vascular damage: no    Anesthesia:  Anesthetic total: 0 mL    Sedation:  Patient sedated: no    Amount of cleaning: standard  Debridement: minimal (skin edges trimmed with scissors)  Skin closure: Steri-Strips  Dressing: gauze roll (non-adhesive 4x3 gauze, kerlex)  Patient tolerance: patient tolerated the procedure well with no immediate complications  Comments: Skin tear: cleaned with sterile saline. Medial side of tear able to be approximated and tacked with steri-strips. Lateral side left open to heal.                           Lab Results (last 24 hours)     ** No results found for the last 24 hours. **                No Radiology Exams Resulted Within Past 24 Hours                     Diagnoses and all orders for this visit:    1. Skin tear of left lower leg without complication, initial encounter (Primary)  -     cephalexin (KEFLEX) 500 MG capsule; Take 1 capsule by mouth 3 (Three) Times a Day for 5 days.  Dispense: 15 capsule; Refill:  "0  -     Discontinue: Gauze Pads & Dressings (Telfa Non-Adherent) 3\"X4\" pads; 1 pad Daily.  Dispense: 14 each; Refill: 0  -     Gauze Pads & Dressings (Telfa Non-Adherent) 3\"X4\" pads; 1 pad Daily.  Dispense: 14 each; Refill: 0    2. Skin tear of right lower leg without complication, initial encounter    3. Type 2 diabetes mellitus without complication, without long-term current use of insulin ([facility])    Other orders  -     Laceration Repair  -     Laceration Repair  -     Laceration Repair                           FOR FULL DISCHARGE INSTRUCTIONS/COMMENTS/HANDOUTS please see the AVS  "

## 2022-06-23 ENCOUNTER — TELEPHONE (OUTPATIENT)
Dept: FAMILY MEDICINE CLINIC | Age: 82
End: 2022-06-23

## 2022-06-23 NOTE — TELEPHONE ENCOUNTER
Daughter called re: the wound on her leg.  She is asking if you will set her up for wound care.  She is very concerned about her leg

## 2022-06-23 NOTE — TELEPHONE ENCOUNTER
Called pt who stated that area is no bigger than a nickel. She still has the bandage on since seeing the provider 6/22/22. She stated that it bled but she knows she is on blood thinners. She is taking the abx that was ordered yesterday. Inf pt that after daughter comes over this evening and takes yesterday's bandage off and looks at the skin tear and feels like she still needs wound care to please call the office and let us know.

## 2022-06-24 DIAGNOSIS — S81.811A SKIN TEAR OF RIGHT LOWER LEG WITHOUT COMPLICATION, INITIAL ENCOUNTER: ICD-10-CM

## 2022-06-24 DIAGNOSIS — S81.812A SKIN TEAR OF LEFT LOWER LEG WITHOUT COMPLICATION, INITIAL ENCOUNTER: Primary | ICD-10-CM

## 2022-06-24 NOTE — TELEPHONE ENCOUNTER
Pt's daughter called stating she would like to proceed with a wound care consult, she works at a hospital and showed some doctors her mom's legs and they are the ones the recommended wound care. She would also like home health to come in. Inf daughter that medicare requires a 3 day hospital stay for home health, however daughter stated her daughter used to work in home health and that it could be done. Will ask pcp about referral to home health, please adv on wound care referral. Daughter stated she did see skin tear last night and the skin is shiny on top, it does look better and she is still on abx.

## 2022-06-25 NOTE — TELEPHONE ENCOUNTER
Call patient or daughter (Honey Hernandez) and see how she is doing.  Ask Honey to send a photo via [employer] so I can see what it looks like.   We can certainly get home health to follow.    Not sure which, if any, agency has a certified wound care nurse.   Delaney use to be with [facility] I believe, but not sure if she is still there.     mas

## 2022-06-27 NOTE — TELEPHONE ENCOUNTER
Inf pts daughter, she says they are looking a little better.  Sent a link for [employer] and she will try to send us pictures

## 2022-06-28 ENCOUNTER — TELEPHONE (OUTPATIENT)
Dept: FAMILY MEDICINE CLINIC | Age: 82
End: 2022-06-28

## 2022-06-28 NOTE — TELEPHONE ENCOUNTER
Caller: KENYA     Relationship: [facility]     Best call back number: [phone]    What is the best time to reach you: ANYTIME    Who are you requesting to speak with (clinical staff, provider,  specific staff member): FLOR    What was the call regarding: PAGE IS CALLING STATING THAT THE PATIENT'S FAMILY HAS CONTACTED THEM, WANTING TO GET WOUND CARE TO COME TO THE HOME.  IF THIS IS SOMETHING THAT NEEDS TO BE DONE, PLEASE ISSUE AN ORDER FOR THIS PATIENT AND LET [facility] KNOW.      Do you require a callback: YES

## 2022-06-29 DIAGNOSIS — S81.812A SKIN TEAR OF LEFT LOWER LEG WITHOUT COMPLICATION, INITIAL ENCOUNTER: Primary | ICD-10-CM

## 2022-06-29 DIAGNOSIS — S81.811A SKIN TEAR OF RIGHT LOWER LEG WITHOUT COMPLICATION, INITIAL ENCOUNTER: ICD-10-CM

## 2022-07-07 ENCOUNTER — TELEPHONE (OUTPATIENT)
Dept: WOUND CARE | Facility: HOSPITAL | Age: 82
End: 2022-07-07

## 2022-07-07 ENCOUNTER — OFFICE VISIT (OUTPATIENT)
Dept: WOUND CARE | Facility: HOSPITAL | Age: 82
End: 2022-07-07

## 2022-07-07 VITALS
RESPIRATION RATE: 18 BRPM | HEART RATE: 73 BPM | SYSTOLIC BLOOD PRESSURE: 130 MMHG | TEMPERATURE: 96.8 F | DIASTOLIC BLOOD PRESSURE: 72 MMHG

## 2022-07-07 DIAGNOSIS — S80.12XA TRAUMATIC HEMATOMA OF LEFT LOWER LEG, INITIAL ENCOUNTER: Primary | ICD-10-CM

## 2022-07-07 DIAGNOSIS — E11.628 TYPE 2 DIABETES MELLITUS WITH OTHER SKIN COMPLICATION, WITHOUT LONG-TERM CURRENT USE OF INSULIN: ICD-10-CM

## 2022-07-07 DIAGNOSIS — Z79.01 ANTICOAGULANT LONG-TERM USE: ICD-10-CM

## 2022-07-07 DIAGNOSIS — S81.812A SKIN TEAR OF LEFT LOWER LEG WITHOUT COMPLICATION, INITIAL ENCOUNTER: ICD-10-CM

## 2022-07-07 DIAGNOSIS — M17.0 PRIMARY OSTEOARTHRITIS OF BOTH KNEES: ICD-10-CM

## 2022-07-07 PROCEDURE — 99204 OFFICE O/P NEW MOD 45 MIN: CPT | Performed by: EMERGENCY MEDICINE

## 2022-07-07 PROCEDURE — G0463 HOSPITAL OUTPT CLINIC VISIT: HCPCS | Performed by: EMERGENCY MEDICINE

## 2022-07-07 RX ORDER — GABAPENTIN 300 MG/1
CAPSULE ORAL
Qty: 60 CAPSULE | Refills: 1 | Status: SHIPPED | OUTPATIENT
Start: 2022-07-07 | End: 2022-09-13 | Stop reason: SDUPTHER

## 2022-07-07 NOTE — PROGRESS NOTES
"Chief Complaint  Wound Check (LLE wound s/p fall 3 weeks ago (BS:130))    Subjective      History of Present Illness    Radha Arana  is a 82 y.o. female who fell going up some steps on 06/19 and sustained skin tears on the front of both lower extremities.  She was seen at her primary care office the next day and the wounds were cleaned and Steri-Stripped.  She was referred here for further evaluation.  The patient is a diabetic and is on Eliquis for atrial fibrillation.    She is not applying anything specific to the wound on her left leg.  There is still a single Steri-Strip in place.  She has been applying Neosporin to the healed wound on the right leg.  She also has a large \"black spot\" below the wound on the LLE that they are not sure what it is or what to do about it.    Allergies:  Morphine      Current Outpatient Medications:   •  albuterol sulfate  (90 Base) MCG/ACT inhaler, Inhale 2 puffs Every 4 (Four) Hours As Needed for Wheezing or Shortness of Air., Disp: 18 g, Rfl: 0  •  colestipol (COLESTID) 1 g tablet, Take 1 tablet by mouth 2 (Two) Times a Day As Needed (diarrhea)., Disp: 60 tablet, Rfl: 0  •  DULoxetine (CYMBALTA) 60 MG capsule, Take 1 capsule by mouth Daily., Disp: 90 capsule, Rfl: 1  •  Eliquis 5 MG tablet tablet, 5 mg Every 12 (Twelve) Hours., Disp: , Rfl:   •  furosemide (LASIX) 40 MG tablet, Take 1 tablet by mouth Daily., Disp: 90 tablet, Rfl: 0  •  gabapentin (NEURONTIN) 300 MG capsule, Take 1 capsule by mouth 2 (Two) Times a Day., Disp: 60 capsule, Rfl: 1  •  Gauze Pads & Dressings (Telfa Non-Adherent) 3\"X4\" pads, 1 pad Daily., Disp: 14 each, Rfl: 0  •  guaiFENesin (MUCINEX) 600 MG 12 hr tablet, Take 1,200 mg by mouth., Disp: , Rfl:   •  ipratropium-albuterol (DUO-NEB) 0.5-2.5 mg/3 ml nebulizer, Take 3 mL by nebulization Every 4 (Four) Hours As Needed., Disp: , Rfl:   •  levothyroxine (SYNTHROID, LEVOTHROID) 25 MCG tablet, Take 1 tablet by mouth Every Morning., Disp: 30 tablet, Rfl: " 5  •  lisinopril (PRINIVIL,ZESTRIL) 10 MG tablet, Take 1 tablet by mouth Daily., Disp: 90 tablet, Rfl: 1  •  Melatonin 10 MG tablet, Take 10 mg by mouth Every Night., Disp: , Rfl:   •  metoprolol succinate XL (TOPROL-XL) 50 MG 24 hr tablet, Take 1 tablet by mouth Daily., Disp: 90 tablet, Rfl: 1  •  multivitamin with minerals tablet tablet, Take 1 tablet by mouth Daily., Disp: , Rfl:   •  omeprazole (priLOSEC) 40 MG capsule, Take 1 capsule by mouth Daily., Disp: 28 capsule, Rfl: 5  •  oxybutynin XL (DITROPAN XL) 15 MG 24 hr tablet, Take 1 tablet by mouth Daily., Disp: 90 tablet, Rfl: 1  •  potassium chloride (K-DUR,KLOR-CON) 20 MEQ CR tablet, Take 1 tablet by mouth 2 (Two) Times a Day., Disp: 180 tablet, Rfl: 1  •  Probiotic Product (PROBIOTIC DAILY PO), Take  by mouth., Disp: , Rfl:   •  silver sulfadiazine (Silvadene) 1 % cream, Apply 1 application topically to the appropriate area as directed Daily., Disp: 50 g, Rfl: 1    Past Medical History:   Diagnosis Date   • Asthma     mild severity; as child, still sometimes needs albuterol;    • Atrial fibrillation (HCC)    • Bilateral primary osteoarthritis of knee    • Chronic back pain     affecting the low back;    • Chronic combined systolic (congestive) and diastolic (congestive) heart failure (HCC)    • Condition not found 10/2010    bilateral sacrililiac injection   • Depression    • GERD (gastroesophageal reflux disease)    • Goiter    • Hyperlipidemia    • Hypertension    • Hypothyroidism    • Local infection of the skin and subcutaneous tissue, unspecified    • Long term (current) use of anticoagulants    • Low back pain    • Morbid (severe) obesity due to excess calories (HCC)    • Nasal mucositis (ulcerative)    • 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    • Obesities, morbid (HCC)    • Obstructive sleep apnea (adult) (pediatric)    • Optic neuritis     with depth perception dysfunction    • Other specified disorders of bone density and  structure, unspecified site    • Paroxysmal atrial fibrillation (HCC)    • Pneumonia     last admit date 1/5/2016   • Postprocedural hypothyroidism    • Primary generalized (osteo)arthritis    • Renal stones    • S/P epidural steroid injection 2007    x2   • Sleep apnea     uses CPAP;    • Spinal stenosis, site unspecified    • Tachycardia, unspecified    • Type 2 diabetes mellitus without complications (HCC)    • Unspecified voice and resonance disorder    • Vitamin D deficiency, unspecified      Past Surgical History:   Procedure Laterality Date   • BLADDER SUSPENSION     • CARPAL TUNNEL RELEASE  06/2018   • CHOLECYSTECTOMY      at age 63   • HYSTERECTOMY      at age 38   • THYROIDECTOMY, PARTIAL       Social History     Socioeconomic History   • Marital status:    Tobacco Use   • Smoking status: Never Smoker   • Smokeless tobacco: Never Used   Vaping Use   • Vaping Use: Never used   Substance and Sexual Activity   • Alcohol use: Not Currently   • Drug use: Never   • Sexual activity: Defer           Objective     Vitals:    07/07/22 1035   BP: 130/72   BP Location: Left arm   Patient Position: Sitting   Pulse: 73   Resp: 18   Temp: 96.8 °F (36 °C)   TempSrc: Temporal   PainSc:   1     There is no height or weight on file to calculate BMI.    STEADI Fall Risk Assessment was completed, and patient is at LOW risk for falls.Assessment completed on:5/4/2022     Review of Systems     ROS:  No fevers, chills, sweats, or body aches.     I have reviewed the HPI and ROS as documented by MA/RN. Alka Chacon MD    Physical Exam     NAD  Normocephalic, atraumatic  EOMI, no scleral icterus  No respiratory distress, no cough  AAOx3, pleasant, cooperative  RLE: Healed skin tear and resolving ecchymoses anterior and lateral lower leg  LLE: Healing skin tear anterior lateral mid lower leg with good granulation tissue and epithelialization.  Single remaining Steri-Strip gently removed.  There is a large thin roofed blood  blister on the anterior distal lower shin with a firm underlying hematoma and clot.  This area is gently unroofed and the hematoma is expressed to reveal a clean wound base.  Small amount of bleeding noted after removal.  Wound was irrigated with saline and hemostasis was achieved with Sheela packing and direct pressure.    See photos for details.    LLE:                    Result Review :  The following data was reviewed by: Alka Chacon MD on 07/07/2022:    PCP notes.  Hemoglobin A1c 05/04/2022 was 6.9.  No recent relevant imaging.             Assessment and Plan   Diagnoses and all orders for this visit:    1. Traumatic hematoma of left lower leg, initial encounter (Primary)    2. Skin tear of left lower leg without complication, initial encounter  -     silver sulfadiazine (Silvadene) 1 % cream; Apply 1 application topically to the appropriate area as directed Daily.  Dispense: 50 g; Refill: 1    3. Anticoagulant long-term use    4. Type 2 diabetes mellitus with other skin complication, without long-term current use of insulin (HCC)      Sheela packed into wound left by hematoma.  Silvadene to healing skin tear left lower leg.  Both of these should be done daily.    Recheck 2 weeks.    Patient was given instructions and counseling regarding their condition or for health maintenance advice, as well as the wound care plan and recommendations. They understand and agree with the plan.  They will follow back up here in the clinic but are instructed to contact us in the interim should they have any new, returning, or worsening symptoms or concerns. Please see specific information pulled into the AVS if appropriate.     Dragon Dictation utilized for chart completion.    Follow Up   Return in about 2 weeks (around 7/21/2022) for Recheck.      Alka Chacon MD

## 2022-07-18 RX ORDER — OMEPRAZOLE 40 MG/1
40 CAPSULE, DELAYED RELEASE ORAL DAILY
Qty: 28 CAPSULE | Refills: 5 | Status: SHIPPED | OUTPATIENT
Start: 2022-07-18 | End: 2022-12-06

## 2022-07-18 RX ORDER — FUROSEMIDE 40 MG/1
40 TABLET ORAL DAILY
Qty: 90 TABLET | Refills: 0 | Status: SHIPPED | OUTPATIENT
Start: 2022-07-18 | End: 2022-10-11

## 2022-07-19 DIAGNOSIS — R19.7 DIARRHEA, UNSPECIFIED TYPE: ICD-10-CM

## 2022-07-20 RX ORDER — MONTELUKAST SODIUM 4 MG/1
1 TABLET, CHEWABLE ORAL 2 TIMES DAILY PRN
Qty: 60 TABLET | Refills: 0 | Status: SHIPPED | OUTPATIENT
Start: 2022-07-20 | End: 2022-08-12 | Stop reason: SDUPTHER

## 2022-07-21 ENCOUNTER — TELEPHONE (OUTPATIENT)
Dept: FAMILY MEDICINE CLINIC | Age: 82
End: 2022-07-21

## 2022-07-21 NOTE — TELEPHONE ENCOUNTER
At this time she is fully vaccinated and fully boosted.  Therefore no need for an additional shot.  However, keep ears open as there may be future recommendations.  Be aware that there is now a medication available to treat COVID so if symptoms develop get tested right away as the medication has to be started very early in the disease (within 5 days).

## 2022-07-21 NOTE — TELEPHONE ENCOUNTER
Caller: Radha Arana    Relationship to patient: Self    Best call back number: [phone]    Patient is needing: PATIENT STATES SHE WANTED TO KNOW IF SHE SHOULD GET ANOTHER COVID BOOSTER.   PATIENT HAS A FAMILY MEMBER WHO IS GOING TO A LARGE CONCERT WHO IS NOT VACCINATED. PATIENT STATES THIS CONCERNS HER AND WANTS TO KNOW WHAT TO DO.     PLEASE CALL AND ADVISE.

## 2022-07-25 ENCOUNTER — OFFICE VISIT (OUTPATIENT)
Dept: WOUND CARE | Facility: HOSPITAL | Age: 82
End: 2022-07-25

## 2022-07-25 VITALS
SYSTOLIC BLOOD PRESSURE: 128 MMHG | HEIGHT: 62 IN | WEIGHT: 199 LBS | HEART RATE: 81 BPM | DIASTOLIC BLOOD PRESSURE: 82 MMHG | TEMPERATURE: 96.2 F | BODY MASS INDEX: 36.62 KG/M2 | RESPIRATION RATE: 18 BRPM

## 2022-07-25 DIAGNOSIS — S81.812D SKIN TEAR OF LEFT LOWER LEG WITHOUT COMPLICATION, SUBSEQUENT ENCOUNTER: ICD-10-CM

## 2022-07-25 DIAGNOSIS — E11.628 TYPE 2 DIABETES MELLITUS WITH OTHER SKIN COMPLICATION, WITHOUT LONG-TERM CURRENT USE OF INSULIN: ICD-10-CM

## 2022-07-25 DIAGNOSIS — S80.12XD TRAUMATIC HEMATOMA OF LEFT LOWER LEG, SUBSEQUENT ENCOUNTER: Primary | ICD-10-CM

## 2022-07-25 PROCEDURE — G0463 HOSPITAL OUTPT CLINIC VISIT: HCPCS | Performed by: EMERGENCY MEDICINE

## 2022-07-25 PROCEDURE — 99213 OFFICE O/P EST LOW 20 MIN: CPT | Performed by: EMERGENCY MEDICINE

## 2022-07-25 NOTE — PROGRESS NOTES
"Chief Complaint  Wound Check (LEFT LEG ULCERS)    Subjective      History of Present Illness    Radha Arana  is a 82 y.o. female who fell going up some steps on 06/19 and sustained skin tears on the front of both lower extremities.  She was seen at her primary care office the next day and the wounds were cleaned and Steri-Stripped.  She was referred here for further evaluation.  The patient is a diabetic and is on Eliquis for atrial fibrillation.    Patient had a large blood blister that was cleaned off at her last visit and revealed a large open wound.  We had her apply Sheela to that area.  She also had a smaller superficial wound proximal to that that they were applying Silvadene to but has now healed.    Allergies:  Morphine      Current Outpatient Medications:   •  albuterol sulfate  (90 Base) MCG/ACT inhaler, Inhale 2 puffs Every 4 (Four) Hours As Needed for Wheezing or Shortness of Air., Disp: 18 g, Rfl: 0  •  colestipol (COLESTID) 1 g tablet, Take 1 tablet by mouth 2 (Two) Times a Day As Needed (diarrhea)., Disp: 60 tablet, Rfl: 0  •  DULoxetine (CYMBALTA) 60 MG capsule, Take 1 capsule by mouth Daily., Disp: 90 capsule, Rfl: 1  •  Eliquis 5 MG tablet tablet, 5 mg Every 12 (Twelve) Hours., Disp: , Rfl:   •  furosemide (LASIX) 40 MG tablet, Take 1 tablet by mouth Daily., Disp: 90 tablet, Rfl: 0  •  gabapentin (NEURONTIN) 300 MG capsule, TAKE ONE CAPSULE BY MOUTH TWICE DAILY, Disp: 60 capsule, Rfl: 1  •  Gauze Pads & Dressings (Telfa Non-Adherent) 3\"X4\" pads, 1 pad Daily., Disp: 14 each, Rfl: 0  •  guaiFENesin (MUCINEX) 600 MG 12 hr tablet, Take 1,200 mg by mouth., Disp: , Rfl:   •  ipratropium-albuterol (DUO-NEB) 0.5-2.5 mg/3 ml nebulizer, Take 3 mL by nebulization Every 4 (Four) Hours As Needed., Disp: , Rfl:   •  levothyroxine (SYNTHROID, LEVOTHROID) 25 MCG tablet, Take 1 tablet by mouth Every Morning., Disp: 30 tablet, Rfl: 5  •  lisinopril (PRINIVIL,ZESTRIL) 10 MG tablet, Take 1 tablet by mouth " History and Physical      Roz Meadows is a 59year old male. HPI   Patient presents with:  Hernia: Pt referred by Dr. Chinedu Huizar regarding RIIH & umbilical hernia x 1 yr.   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, had bad constipated  a Daily., Disp: 90 tablet, Rfl: 1  •  Melatonin 10 MG tablet, Take 10 mg by mouth Every Night., Disp: , Rfl:   •  metoprolol succinate XL (TOPROL-XL) 50 MG 24 hr tablet, Take 1 tablet by mouth Daily., Disp: 90 tablet, Rfl: 1  •  multivitamin with minerals tablet tablet, Take 1 tablet by mouth Daily., Disp: , Rfl:   •  omeprazole (priLOSEC) 40 MG capsule, Take 1 capsule by mouth Daily., Disp: 28 capsule, Rfl: 5  •  oxybutynin XL (DITROPAN XL) 15 MG 24 hr tablet, Take 1 tablet by mouth Daily., Disp: 90 tablet, Rfl: 1  •  potassium chloride (K-DUR,KLOR-CON) 20 MEQ CR tablet, Take 1 tablet by mouth 2 (Two) Times a Day., Disp: 180 tablet, Rfl: 1  •  Probiotic Product (PROBIOTIC DAILY PO), Take  by mouth., Disp: , Rfl:   •  silver sulfadiazine (Silvadene) 1 % cream, Apply 1 application topically to the appropriate area as directed Daily., Disp: 50 g, Rfl: 1    Past Medical History:   Diagnosis Date   • Asthma     mild severity; as child, still sometimes needs albuterol;    • Atrial fibrillation (HCC)    • Bilateral primary osteoarthritis of knee    • Chronic back pain     affecting the low back;    • Chronic combined systolic (congestive) and diastolic (congestive) heart failure (HCC)    • Condition not found 10/2010    bilateral sacrililiac injection   • Depression    • GERD (gastroesophageal reflux disease)    • Goiter    • Hyperlipidemia    • Hypertension    • Hypothyroidism    • Local infection of the skin and subcutaneous tissue, unspecified    • Long term (current) use of anticoagulants    • Low back pain    • Morbid (severe) obesity due to excess calories (HCC)    • Nasal mucositis (ulcerative)    • 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    • Obesities, morbid (HCC)    • Obstructive sleep apnea (adult) (pediatric)    • Optic neuritis     with depth perception dysfunction    • Other specified disorders of bone density and structure, unspecified site    • Paroxysmal atrial fibrillation (HCC)    •  Richie    Social History    Socioeconomic History      Marital status: Life Partner      Spouse name: Not on file      Number of children: 4      Years of education: Not on file      Highest education level: Not on file    Occupational History      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   • Postprocedural hypothyroidism    • Primary generalized (osteo)arthritis    • Renal stones    • S/P epidural steroid injection 2007    x2   • Sleep apnea     uses CPAP;    • Spinal stenosis, site unspecified    • Tachycardia, unspecified    • Type 2 diabetes mellitus without complications (HCC)    • Unspecified voice and resonance disorder    • Vitamin D deficiency, unspecified      Past Surgical History:   Procedure Laterality Date   • BLADDER SUSPENSION     • CARPAL TUNNEL RELEASE  06/2018   • CHOLECYSTECTOMY      at age 63   • HYSTERECTOMY      at age 38   • THYROIDECTOMY, PARTIAL       Social History     Socioeconomic History   • Marital status:    Tobacco Use   • Smoking status: Never Smoker   • Smokeless tobacco: Never Used   Vaping Use   • Vaping Use: Never used   Substance and Sexual Activity   • Alcohol use: Not Currently   • Drug use: Never   • Sexual activity: Defer           Objective     Vitals:    07/25/22 1125   BP: 128/82   BP Location: Right arm   Patient Position: Sitting   Pulse: 81   Resp: 18   Temp: 96.2 °F (35.7 °C)   TempSrc: Temporal   Weight: 90.3 kg (199 lb)   Height: 156.2 cm (61.5\")   PainSc: 0-No pain     Body mass index is 36.99 kg/m².    STEADI Fall Risk Assessment was completed, and patient is at LOW risk for falls.Assessment completed on:5/4/2022     Review of Systems     ROS:  No fevers, chills, sweats, or body aches.     I have reviewed the HPI and ROS as documented by MA/RN. Alka Chacon MD    Physical Exam     NAD  Normocephalic, atraumatic  EOMI, no scleral icterus  No respiratory distress, no cough  AAOx3, pleasant, cooperative  RLE: Healed skin tear and resolving ecchymoses anterior and lateral lower leg  LLE: Healed wound anterior lateral mid lower leg.  The wound on the anterior distal lower shin had a large piece of dead dry skin lifting away from the edges which I gently removed.  The wound beneath is healing extremely " hyperintense. Emma Stephanie are scattered areas of linear/wedge-shaped low signal intensity.  No suspicious index lesions are seen. Emma Stephanie is no focal bulge of the prostate capsule, narrowing of the rectoprostatic angle or involvement of the neurovascular bundle. Normal configuration of the duodenum. Small bowel and colon are nondilated. Descending to sigmoid colon diverticula are noted, without adjacent soft tissue stranding. Normal appendix.  Oral contrast opacifies the colon and portions of the small bowel, witho well and filling in nicely.  There is a very small healing wound that remains.  No evidence of infection, no significant TTP.  See photos for details.    LLE:              Result Review :  The following data was reviewed by: Alka Chacon MD on 07/25/2022:  notes and images.             Assessm  Prior ent and Plan   Diagnoses and all orders for this visit:    1. Traumatic hematoma of left lower leg, subsequent encounter (Primary)    2. Skin tear of left lower leg without complication, subsequent encounter    3. Type 2 diabetes mellitus with other skin complication, without long-term current use of insulin (HCC)      Wound is healing extremely well.  We will have her continue to use Sheela Ag packed into wound left by hematoma.  The other wound has healed now.    Recheck 3 weeks.    Patient was given instructions and counseling regarding their condition or for health maintenance advice, as well as the wound care plan and recommendations. They understand and agree with the plan.  They will follow back up here in the clinic but are instructed to contact us in the interim should they have any new, returning, or worsening symptoms or concerns. Please see specific information pulled into the AVS if appropriate.     Dragon Dictation utilized for chart completion.    Follow Up   Return in about 3 weeks (around 8/15/2022) for Recheck.      Alka Chacon MD   fat-containing umbilical hernia. Asymmetry raising the possibility of a right inguinal canal lipoma versus fat-containing right inguinal hernia. 6. Degenerative changes of the spine, sacroiliac joints, and pubic symphysis.  A stable nonspecific subcentime

## 2022-07-26 ENCOUNTER — OUTSIDE FACILITY SERVICE (OUTPATIENT)
Dept: FAMILY MEDICINE CLINIC | Age: 82
End: 2022-07-26

## 2022-07-26 PROCEDURE — G0180 MD CERTIFICATION HHA PATIENT: HCPCS | Performed by: FAMILY MEDICINE

## 2022-08-12 DIAGNOSIS — R19.7 DIARRHEA, UNSPECIFIED TYPE: ICD-10-CM

## 2022-08-12 RX ORDER — DULOXETIN HYDROCHLORIDE 60 MG/1
60 CAPSULE, DELAYED RELEASE ORAL DAILY
Qty: 90 CAPSULE | Refills: 1 | Status: SHIPPED | OUTPATIENT
Start: 2022-08-12 | End: 2023-02-06

## 2022-08-12 RX ORDER — LISINOPRIL 10 MG/1
10 TABLET ORAL DAILY
Qty: 90 TABLET | Refills: 1 | Status: SHIPPED | OUTPATIENT
Start: 2022-08-12 | End: 2023-02-06

## 2022-08-12 RX ORDER — MONTELUKAST SODIUM 4 MG/1
1 TABLET, CHEWABLE ORAL 2 TIMES DAILY PRN
Qty: 60 TABLET | Refills: 0 | Status: SHIPPED | OUTPATIENT
Start: 2022-08-12 | End: 2022-11-11

## 2022-08-12 RX ORDER — OXYBUTYNIN CHLORIDE 15 MG/1
15 TABLET, EXTENDED RELEASE ORAL DAILY
Qty: 90 TABLET | Refills: 1 | Status: SHIPPED | OUTPATIENT
Start: 2022-08-12 | End: 2023-02-06

## 2022-09-13 DIAGNOSIS — M17.0 PRIMARY OSTEOARTHRITIS OF BOTH KNEES: ICD-10-CM

## 2022-09-13 RX ORDER — GABAPENTIN 300 MG/1
300 CAPSULE ORAL 2 TIMES DAILY
Qty: 60 CAPSULE | Refills: 1 | Status: SHIPPED | OUTPATIENT
Start: 2022-09-13 | End: 2022-11-14

## 2022-10-11 RX ORDER — FUROSEMIDE 40 MG/1
TABLET ORAL
Qty: 90 TABLET | Refills: 0 | Status: SHIPPED | OUTPATIENT
Start: 2022-10-11 | End: 2023-01-06

## 2022-11-11 DIAGNOSIS — M17.0 PRIMARY OSTEOARTHRITIS OF BOTH KNEES: ICD-10-CM

## 2022-11-11 DIAGNOSIS — R19.7 DIARRHEA, UNSPECIFIED TYPE: ICD-10-CM

## 2022-11-11 RX ORDER — MONTELUKAST SODIUM 4 MG/1
TABLET, CHEWABLE ORAL
Qty: 60 TABLET | Refills: 0 | Status: SHIPPED | OUTPATIENT
Start: 2022-11-11

## 2022-11-14 DIAGNOSIS — M17.0 PRIMARY OSTEOARTHRITIS OF BOTH KNEES: ICD-10-CM

## 2022-11-14 RX ORDER — GABAPENTIN 300 MG/1
CAPSULE ORAL
Qty: 60 CAPSULE | Refills: 0 | Status: SHIPPED | OUTPATIENT
Start: 2022-11-14 | End: 2022-12-09

## 2022-11-14 RX ORDER — GABAPENTIN 300 MG/1
300 CAPSULE ORAL 2 TIMES DAILY
Qty: 60 CAPSULE | Refills: 1 | Status: CANCELLED | OUTPATIENT
Start: 2022-11-14

## 2022-11-14 NOTE — TELEPHONE ENCOUNTER
LAST OV: 5/4/22  NEXT OV: 11/18/22  LAST REFILL: 9/13/22 # 60 with 1 refill  LAST LAB/UDS: 11/10/21

## 2022-11-14 NOTE — TELEPHONE ENCOUNTER
Caller: ELIAZAR    Relationship: [facility]  Best call back number:[phone]    Requested Prescriptions:   Requested Prescriptions     Pending Prescriptions Disp Refills   • gabapentin (NEURONTIN) 300 MG capsule 60 capsule 1     Sig: Take 1 capsule by mouth 2 (Two) Times a Day.        Pharmacy where request should be sent: [facility] - [address] [facility] [address] [facility] [phone] [facility] [phone] FX     Does the patient have less than a 3 day supply:  [x] Yes  [] No    Anton Lainez Rep   11/14/22 14:33 EST

## 2022-11-15 NOTE — TELEPHONE ENCOUNTER
Outpatient Medication Detail    gabapentin (NEURONTIN) 300 MG capsule        Sig: TAKE ONE CAPSULE BY MOUTH TWICE DAILY        Sent to pharmacy as: Gabapentin 300 MG Oral Capsule (NEURONTIN)        Class: Normal        E-Prescribing Status: Receipt confirmed by pharmacy (11/14/2022  4:31 PM EST)

## 2022-11-18 ENCOUNTER — LAB (OUTPATIENT)
Dept: LAB | Facility: HOSPITAL | Age: 82
End: 2022-11-18

## 2022-11-18 ENCOUNTER — OFFICE VISIT (OUTPATIENT)
Dept: FAMILY MEDICINE CLINIC | Age: 82
End: 2022-11-18

## 2022-11-18 VITALS
HEIGHT: 61 IN | OXYGEN SATURATION: 93 % | WEIGHT: 196.5 LBS | DIASTOLIC BLOOD PRESSURE: 57 MMHG | SYSTOLIC BLOOD PRESSURE: 106 MMHG | BODY MASS INDEX: 37.1 KG/M2 | HEART RATE: 79 BPM

## 2022-11-18 DIAGNOSIS — K52.832 LYMPHOCYTIC COLITIS: ICD-10-CM

## 2022-11-18 DIAGNOSIS — E11.9 TYPE 2 DIABETES MELLITUS WITHOUT COMPLICATION, WITHOUT LONG-TERM CURRENT USE OF INSULIN: ICD-10-CM

## 2022-11-18 DIAGNOSIS — M48.00 SPINAL STENOSIS, UNSPECIFIED SPINAL REGION: ICD-10-CM

## 2022-11-18 DIAGNOSIS — I48.0 PAROXYSMAL ATRIAL FIBRILLATION: ICD-10-CM

## 2022-11-18 DIAGNOSIS — Z23 NEED FOR VACCINATION: ICD-10-CM

## 2022-11-18 DIAGNOSIS — R26.81 GAIT INSTABILITY: ICD-10-CM

## 2022-11-18 DIAGNOSIS — Z79.899 LONG-TERM USE OF HIGH-RISK MEDICATION: ICD-10-CM

## 2022-11-18 DIAGNOSIS — M15.0 PRIMARY GENERALIZED (OSTEO)ARTHRITIS: ICD-10-CM

## 2022-11-18 DIAGNOSIS — I50.42 CHRONIC COMBINED SYSTOLIC AND DIASTOLIC HEART FAILURE: ICD-10-CM

## 2022-11-18 DIAGNOSIS — Z00.00 MEDICARE ANNUAL WELLNESS VISIT, SUBSEQUENT: Primary | ICD-10-CM

## 2022-11-18 DIAGNOSIS — E03.9 ACQUIRED HYPOTHYROIDISM: ICD-10-CM

## 2022-11-18 LAB
ALBUMIN SERPL-MCNC: 3.7 G/DL (ref 3.5–5.2)
ALBUMIN/GLOB SERPL: 1 G/DL
ALP SERPL-CCNC: 84 U/L (ref 39–117)
ALT SERPL W P-5'-P-CCNC: 9 U/L (ref 1–33)
AMPHET+METHAMPHET UR QL: NEGATIVE
AMPHETAMINES UR QL: NEGATIVE
ANION GAP SERPL CALCULATED.3IONS-SCNC: 9.7 MMOL/L (ref 5–15)
AST SERPL-CCNC: 10 U/L (ref 1–32)
BARBITURATES UR QL SCN: NEGATIVE
BENZODIAZ UR QL SCN: NEGATIVE
BILIRUB SERPL-MCNC: 0.3 MG/DL (ref 0–1.2)
BUN SERPL-MCNC: 17 MG/DL (ref 8–23)
BUN/CREAT SERPL: 19.5 (ref 7–25)
BUPRENORPHINE SERPL-MCNC: NEGATIVE NG/ML
CALCIUM SPEC-SCNC: 9.8 MG/DL (ref 8.6–10.5)
CANNABINOIDS SERPL QL: NEGATIVE
CHLORIDE SERPL-SCNC: 101 MMOL/L (ref 98–107)
CHOLEST SERPL-MCNC: 205 MG/DL (ref 0–200)
CO2 SERPL-SCNC: 27.3 MMOL/L (ref 22–29)
COCAINE UR QL: NEGATIVE
CREAT SERPL-MCNC: 0.87 MG/DL (ref 0.57–1)
EGFRCR SERPLBLD CKD-EPI 2021: 65.8 ML/MIN/1.73
EXPIRATION DATE: NORMAL
EXPIRATION DATE: NORMAL
GLOBULIN UR ELPH-MCNC: 3.6 GM/DL
GLUCOSE SERPL-MCNC: 88 MG/DL (ref 65–99)
HBA1C MFR BLD: 6.2 %
HDLC SERPL-MCNC: 56 MG/DL (ref 40–60)
LDLC SERPL CALC-MCNC: 132 MG/DL (ref 0–100)
LDLC/HDLC SERPL: 2.32 {RATIO}
Lab: NORMAL
Lab: NORMAL
MDMA UR QL SCN: NEGATIVE
METHADONE UR QL SCN: NEGATIVE
OPIATES UR QL: NEGATIVE
OXYCODONE UR QL SCN: NEGATIVE
PCP UR QL SCN: NEGATIVE
POTASSIUM SERPL-SCNC: 4.5 MMOL/L (ref 3.5–5.2)
PROT SERPL-MCNC: 7.3 G/DL (ref 6–8.5)
SODIUM SERPL-SCNC: 138 MMOL/L (ref 136–145)
TRIGL SERPL-MCNC: 95 MG/DL (ref 0–150)
TSH SERPL DL<=0.05 MIU/L-ACNC: 1.67 UIU/ML (ref 0.27–4.2)
VLDLC SERPL-MCNC: 17 MG/DL (ref 5–40)

## 2022-11-18 PROCEDURE — 99213 OFFICE O/P EST LOW 20 MIN: CPT | Performed by: FAMILY MEDICINE

## 2022-11-18 PROCEDURE — G0439 PPPS, SUBSEQ VISIT: HCPCS | Performed by: FAMILY MEDICINE

## 2022-11-18 PROCEDURE — 80053 COMPREHEN METABOLIC PANEL: CPT | Performed by: FAMILY MEDICINE

## 2022-11-18 PROCEDURE — 80061 LIPID PANEL: CPT | Performed by: FAMILY MEDICINE

## 2022-11-18 PROCEDURE — 83036 HEMOGLOBIN GLYCOSYLATED A1C: CPT | Performed by: FAMILY MEDICINE

## 2022-11-18 PROCEDURE — 0124A PR ADM SARSCOV2 30MCG/0.3ML BST: CPT | Performed by: FAMILY MEDICINE

## 2022-11-18 PROCEDURE — 80305 DRUG TEST PRSMV DIR OPT OBS: CPT | Performed by: FAMILY MEDICINE

## 2022-11-18 PROCEDURE — 3044F HG A1C LEVEL LT 7.0%: CPT | Performed by: FAMILY MEDICINE

## 2022-11-18 PROCEDURE — 1159F MED LIST DOCD IN RCRD: CPT | Performed by: FAMILY MEDICINE

## 2022-11-18 PROCEDURE — 36415 COLL VENOUS BLD VENIPUNCTURE: CPT | Performed by: FAMILY MEDICINE

## 2022-11-18 PROCEDURE — 1170F FXNL STATUS ASSESSED: CPT | Performed by: FAMILY MEDICINE

## 2022-11-18 PROCEDURE — 91312 COVID-19 (PFIZER) BIVALENT BOOSTER 12+YRS: CPT | Performed by: FAMILY MEDICINE

## 2022-11-18 PROCEDURE — 84443 ASSAY THYROID STIM HORMONE: CPT | Performed by: FAMILY MEDICINE

## 2022-11-18 NOTE — PROGRESS NOTES
The ABCs of the Annual Wellness Visit  Subsequent Medicare Wellness Visit    Chief Complaint   Patient presents with   • Medicare Wellness-subsequent   • Hypothyroidism   • Hypertension     6 month follow up      Subjective    History of Present Illness:  Radha Arana is a 82 y.o. female who presents for a Subsequent Medicare Wellness Visit.  Accompanied to the visit with her daughter Honey      The following portions of the patient's history were reviewed and   updated as appropriate: allergies, current medications, past family history, past medical history, past social history, past surgical history and problem list.    Compared to one year ago, the patient feels her physical   health is the same.    Compared to one year ago, the patient feels her mental   health is the same.    Recent Hospitalizations:  She was not admitted to the hospital during the last year.   Chronic/Acute Health Issues:    She did sustain a traumatic hematoma and skin tear left lower leg.  Was followed by [facility] center in [facility].  That has finally healed up very nicely.    In July developed recurrent diarrhea was considered again for budesonide by her gastroenterologist Dr. Doll for microscopic colitis, but it resolved with the colestid.  She admits the diarrhea goes away and she tends to stop the colestid.  So we discussed the importance of the compliance with medication.    Saw Dr. Bolton in follow-up of her atrial fibrillation.  I reviewed that note.  She continues on Eliquis.    She sees Dr. Gupta for her sleep apnea and actually has an appointment later today        Current Medical Providers:  Patient Care Team:  Rell Harmon MD as PCP - General (Family Medicine)    Outpatient Medications Prior to Visit   Medication Sig Dispense Refill   • albuterol sulfate  (90 Base) MCG/ACT inhaler Inhale 2 puffs Every 4 (Four) Hours As Needed for Wheezing or Shortness of Air. 18 g 0   • colestipol (COLESTID) 1 g tablet TAKE ONE  "TABLET BY MOUTH TWICE DAILY AS NEEDED 60 tablet 0   • DULoxetine (CYMBALTA) 60 MG capsule Take 1 capsule by mouth Daily. 90 capsule 1   • Eliquis 5 MG tablet tablet 5 mg Every 12 (Twelve) Hours.     • furosemide (LASIX) 40 MG tablet TAKE ONE TABLET BY MOUTH EVERY DAY 90 tablet 0   • gabapentin (NEURONTIN) 300 MG capsule TAKE ONE CAPSULE BY MOUTH TWICE DAILY 60 capsule 0   • Gauze Pads & Dressings (Telfa Non-Adherent) 3\"X4\" pads 1 pad Daily. 14 each 0   • guaiFENesin (MUCINEX) 600 MG 12 hr tablet Take 1,200 mg by mouth.     • ipratropium-albuterol (DUO-NEB) 0.5-2.5 mg/3 ml nebulizer Take 3 mL by nebulization Every 4 (Four) Hours As Needed.     • levothyroxine (SYNTHROID, LEVOTHROID) 25 MCG tablet Take 1 tablet by mouth Every Morning. 30 tablet 5   • lisinopril (PRINIVIL,ZESTRIL) 10 MG tablet Take 1 tablet by mouth Daily. 90 tablet 1   • Melatonin 10 MG tablet Take 10 mg by mouth Every Night.     • metoprolol succinate XL (TOPROL-XL) 50 MG 24 hr tablet Take 1 tablet by mouth Daily. 90 tablet 1   • multivitamin with minerals tablet tablet Take 1 tablet by mouth Daily.     • omeprazole (priLOSEC) 40 MG capsule Take 1 capsule by mouth Daily. 28 capsule 5   • oxybutynin XL (DITROPAN XL) 15 MG 24 hr tablet Take 1 tablet by mouth Daily. 90 tablet 1   • potassium chloride (K-DUR,KLOR-CON) 20 MEQ CR tablet Take 1 tablet by mouth 2 (Two) Times a Day. 180 tablet 1   • Probiotic Product (PROBIOTIC DAILY PO) Take  by mouth.     • silver sulfadiazine (Silvadene) 1 % cream Apply 1 application topically to the appropriate area as directed Daily. 50 g 1     No facility-administered medications prior to visit.       No opioid medication identified on active medication list. I have reviewed chart for other potential  high risk medication/s and harmful drug interactions in the elderly.          Aspirin is not on active medication list.  Aspirin use is not indicated based on review of current medical condition/s. Risk of harm outweighs " "potential benefits.  .    Patient Active Problem List   Diagnosis   • Spinal stenosis   • Chronic combined systolic and diastolic heart failure (HCC)   • Paroxysmal atrial fibrillation (HCC)   • Primary generalized (osteo)arthritis   • Type 2 diabetes mellitus without complication, without long-term current use of insulin (HCC)   • Acute pain of both shoulders   • Diarrhea   • Acquired hypothyroidism   • Lymphocytic colitis   • Medicare annual wellness visit, subsequent   • Dizziness   • Wheeze     Advance Care Planning  Advance Directive is not on file.  ACP discussion was held with the patient during this visit. Patient does not have an advance directive, information provided.          Objective    Vitals:    11/18/22 0925   BP: 106/57   BP Location: Left arm   Patient Position: Sitting   Cuff Size: Large Adult   Pulse: 79   SpO2: 93%   Weight: 89.1 kg (196 lb 8 oz)   Height: 156.2 cm (61.5\")     Estimated body mass index is 36.53 kg/m² as calculated from the following:    Height as of this encounter: 156.2 cm (61.5\").    Weight as of this encounter: 89.1 kg (196 lb 8 oz).    Class 2 Severe Obesity (BMI >=35 and <=39.9). Obesity-related health conditions include the following: obstructive sleep apnea and hypertension. Obesity is unchanged. BMI is is above average; BMI management plan is completed. We discussed portion control and increasing exercise.      Does the patient have evidence of cognitive impairment? No    Physical Exam  Constitutional:       Appearance: Normal appearance. She is obese.   HENT:      Right Ear: Tympanic membrane normal.      Left Ear: Tympanic membrane normal.      Mouth/Throat:      Pharynx: No oropharyngeal exudate or posterior oropharyngeal erythema.   Eyes:      General: No scleral icterus.        Right eye: No discharge.         Left eye: No discharge.   Neck:      Vascular: No carotid bruit.   Cardiovascular:      Rate and Rhythm: Normal rate and regular rhythm.      Heart sounds: " Normal heart sounds. No murmur heard.  Pulmonary:      Effort: No respiratory distress.      Breath sounds: No wheezing or rales.   Abdominal:      General: Bowel sounds are normal.      Tenderness: There is no abdominal tenderness. There is no guarding.   Musculoskeletal:      Right lower leg: No edema.      Left lower leg: No edema.   Lymphadenopathy:      Cervical: No cervical adenopathy.   Neurological:      General: No focal deficit present.      Mental Status: She is alert.   Psychiatric:         Attention and Perception: Attention normal.         Mood and Affect: Mood normal.         Speech: Speech normal.       Lab Results   Component Value Date    TRIG 95 11/18/2022    HDL 56 11/18/2022     (H) 11/18/2022    VLDL 17 11/18/2022    HGBA1C 6.2 11/18/2022            HEALTH RISK ASSESSMENT    Smoking Status:  Social History     Tobacco Use   Smoking Status Never   Smokeless Tobacco Never     Alcohol Consumption:  Social History     Substance and Sexual Activity   Alcohol Use Not Currently     Fall Risk Screen:    STEADI Fall Risk Assessment was completed, and patient is at LOW risk for falls.Assessment completed on:5/4/2022    Depression Screening:  PHQ-2/PHQ-9 Depression Screening 11/18/2022   Retired PHQ-9 Total Score -   Retired Total Score -   Little Interest or Pleasure in Doing Things 0-->not at all   Feeling Down, Depressed or Hopeless 0-->not at all   PHQ-9: Brief Depression Severity Measure Score 0       Health Habits and Functional and Cognitive Screening:  Functional & Cognitive Status 11/18/2022   Do you have difficulty preparing food and eating? No   Do you have difficulty bathing yourself, getting dressed or grooming yourself? No   Do you have difficulty using the toilet? No   Do you have difficulty moving around from place to place? No   Do you have trouble with steps or getting out of a bed or a chair? Yes   Current Diet Well Balanced Diet   Dental Exam Not up to date        Dental Exam  Comment -   Eye Exam Up to date        Eye Exam Comment -   Exercise (times per week) 0 times per week   Current Exercises Include No Regular Exercise   Do you need help using the phone?  No   Are you deaf or do you have serious difficulty hearing?  Yes   Do you need help with transportation? Yes   Do you need help shopping? No   Do you need help preparing meals?  No   Do you need help with housework?  No   Do you need help with laundry? No   Do you need help taking your medications? No   Do you need help managing money? Yes   Do you ever drive or ride in a car without wearing a seat belt? No   Have you felt unusual stress, anger or loneliness in the last month? No   Who do you live with? Child   If you need help, do you have trouble finding someone available to you? No   Have you been bothered in the last four weeks by sexual problems? No   Do you have difficulty concentrating, remembering or making decisions? Yes       Age-appropriate Screening Schedule:  Refer to the list below for future screening recommendations based on patient's age, sex and/or medical conditions. Orders for these recommended tests are listed in the plan section. The patient has been provided with a written plan.    Health Maintenance   Topic Date Due   • ZOSTER VACCINE (2 of 3) 06/12/2015   • DXA SCAN  11/20/2021   • URINE MICROALBUMIN  05/04/2023   • HEMOGLOBIN A1C  05/18/2023   • DIABETIC EYE EXAM  11/17/2023   • LIPID PANEL  11/18/2023   • TDAP/TD VACCINES (2 - Td or Tdap) 04/17/2025   • INFLUENZA VACCINE  Completed              Assessment & Plan   CMS Preventative Services Quick Reference  Risk Factors Identified During Encounter  Inactivity/Sedentary  Obesity/Overweight   The above risks/problems have been discussed with the patient.  Follow up actions/plans if indicated are seen below in the Assessment/Plan Section.  Pertinent information has been shared with the patient in the After Visit Summary.    Diagnoses and all orders for this  visit:    1. Medicare annual wellness visit, subsequent (Primary)  Comments:  We reviewed the preventive service recommendations and created an individualized handout.  We did review her immunization status.    2. Acquired hypothyroidism  Comments:  We will check lab predicate medication change based on result  Orders:  -     TSH    3. Type 2 diabetes mellitus without complication, without long-term current use of insulin (HCC)  Comments:  Hemoglobin A1c looks great today.  Commend her for the good job.  Continue on her current regimen  Orders:  -     POC Glycosylated Hemoglobin (Hb A1C)  -     Comprehensive Metabolic Panel  -     Lipid Panel    4. Paroxysmal atrial fibrillation (HCC)  Comments:  Reviewed Dr. Bolton's note.  Continue on current regimen.  She is anticoagulated.  Orders:  -     Lipid Panel    5. Chronic combined systolic and diastolic heart failure (HCC)  Comments:  Seems well compensated.  No pedal edema today    6. Primary generalized (osteo)arthritis  Comments:  Presents here with significant challenges.  We are going to get physical therapy, and work with her again  Orders:  -     Ambulatory Referral to Home Health    7. Spinal stenosis, unspecified spinal region  Comments:  Again, physical therapy hopefully will be beneficial help with her gait stability  Orders:  -     Ambulatory Referral to Home Health    8. Long-term use of high-risk medication  -     POC Urine Drug Screen Premier Bio-Cup    9. Lymphocytic colitis  Comments:  Continue with Colestid as prescribed.  Follow-up with GI as necessary    10. Need for vaccination  -     COVID-19 Bivalent Booster (Pfizer) 12+yrs    11. Gait instability  Comments:  As noted above physical therapy  Orders:  -     Ambulatory Referral to Home Health        Follow Up:   No follow-ups on file.     An After Visit Summary and PPPS were made available to the patient.

## 2022-12-06 DIAGNOSIS — I10 ESSENTIAL HYPERTENSION: ICD-10-CM

## 2022-12-06 DIAGNOSIS — E87.6 HYPOKALEMIA: ICD-10-CM

## 2022-12-06 RX ORDER — OMEPRAZOLE 40 MG/1
CAPSULE, DELAYED RELEASE ORAL
Qty: 90 CAPSULE | Refills: 1 | Status: SHIPPED | OUTPATIENT
Start: 2022-12-06

## 2022-12-06 RX ORDER — LEVOTHYROXINE SODIUM 0.03 MG/1
TABLET ORAL
Qty: 90 TABLET | Refills: 1 | Status: SHIPPED | OUTPATIENT
Start: 2022-12-06

## 2022-12-06 RX ORDER — POTASSIUM CHLORIDE 20 MEQ/1
TABLET, EXTENDED RELEASE ORAL
Qty: 180 TABLET | Refills: 1 | Status: SHIPPED | OUTPATIENT
Start: 2022-12-06

## 2022-12-06 RX ORDER — METOPROLOL SUCCINATE 50 MG/1
TABLET, EXTENDED RELEASE ORAL
Qty: 90 TABLET | Refills: 1 | Status: SHIPPED | OUTPATIENT
Start: 2022-12-06

## 2022-12-09 DIAGNOSIS — M17.0 PRIMARY OSTEOARTHRITIS OF BOTH KNEES: ICD-10-CM

## 2022-12-09 RX ORDER — GABAPENTIN 300 MG/1
CAPSULE ORAL
Qty: 60 CAPSULE | Refills: 2 | Status: SHIPPED | OUTPATIENT
Start: 2022-12-09 | End: 2023-03-08

## 2022-12-15 ENCOUNTER — OUTSIDE FACILITY SERVICE (OUTPATIENT)
Dept: FAMILY MEDICINE CLINIC | Age: 82
End: 2022-12-15

## 2022-12-15 PROCEDURE — G0180 MD CERTIFICATION HHA PATIENT: HCPCS | Performed by: FAMILY MEDICINE

## 2022-12-27 ENCOUNTER — HOSPITAL ENCOUNTER (OUTPATIENT)
Dept: GENERAL RADIOLOGY | Facility: HOSPITAL | Age: 82
Discharge: HOME | End: 2022-12-27

## 2022-12-27 ENCOUNTER — TELEPHONE (OUTPATIENT)
Dept: FAMILY MEDICINE CLINIC | Age: 82
End: 2022-12-27

## 2022-12-27 ENCOUNTER — OFFICE VISIT (OUTPATIENT)
Dept: FAMILY MEDICINE CLINIC | Age: 82
End: 2022-12-27

## 2022-12-27 ENCOUNTER — LAB (OUTPATIENT)
Dept: LAB | Facility: HOSPITAL | Age: 82
End: 2022-12-27

## 2022-12-27 VITALS
SYSTOLIC BLOOD PRESSURE: 134 MMHG | DIASTOLIC BLOOD PRESSURE: 95 MMHG | HEART RATE: 76 BPM | OXYGEN SATURATION: 90 % | TEMPERATURE: 99.3 F

## 2022-12-27 DIAGNOSIS — I50.9 ACUTE ON CHRONIC CONGESTIVE HEART FAILURE, UNSPECIFIED HEART FAILURE TYPE: ICD-10-CM

## 2022-12-27 DIAGNOSIS — R53.1 WEAKNESS: ICD-10-CM

## 2022-12-27 DIAGNOSIS — R06.02 SHORTNESS OF BREATH: ICD-10-CM

## 2022-12-27 DIAGNOSIS — N39.0 ACUTE UTI: ICD-10-CM

## 2022-12-27 DIAGNOSIS — R06.02 SHORTNESS OF BREATH: Primary | ICD-10-CM

## 2022-12-27 DIAGNOSIS — L30.4 INTERTRIGO: ICD-10-CM

## 2022-12-27 LAB
BACTERIA UR QL AUTO: ABNORMAL /HPF
BASOPHILS # BLD AUTO: 0.06 10*3/MM3 (ref 0–0.2)
BASOPHILS NFR BLD AUTO: 0.4 % (ref 0–1.5)
BILIRUB UR QL STRIP: NEGATIVE
CLARITY UR: ABNORMAL
COLOR UR: YELLOW
D DIMER PPP FEU-MCNC: 0.49 MCGFEU/ML (ref 0–0.84)
DEPRECATED RDW RBC AUTO: 48.7 FL (ref 37–54)
EOSINOPHIL # BLD AUTO: 0.21 10*3/MM3 (ref 0–0.4)
EOSINOPHIL NFR BLD AUTO: 1.5 % (ref 0.3–6.2)
ERYTHROCYTE [DISTWIDTH] IN BLOOD BY AUTOMATED COUNT: 14.2 % (ref 12.3–15.4)
GLUCOSE UR STRIP-MCNC: NEGATIVE MG/DL
HCT VFR BLD AUTO: 38 % (ref 34–46.6)
HGB BLD-MCNC: 11.8 G/DL (ref 12–15.9)
HGB UR QL STRIP.AUTO: ABNORMAL
IMM GRANULOCYTES # BLD AUTO: 0.05 10*3/MM3 (ref 0–0.05)
IMM GRANULOCYTES NFR BLD AUTO: 0.4 % (ref 0–0.5)
KETONES UR QL STRIP: NEGATIVE
LEUKOCYTE ESTERASE UR QL STRIP.AUTO: ABNORMAL
LYMPHOCYTES # BLD AUTO: 1.82 10*3/MM3 (ref 0.7–3.1)
LYMPHOCYTES NFR BLD AUTO: 12.8 % (ref 19.6–45.3)
MCH RBC QN AUTO: 28.9 PG (ref 26.6–33)
MCHC RBC AUTO-ENTMCNC: 31.1 G/DL (ref 31.5–35.7)
MCV RBC AUTO: 93.1 FL (ref 79–97)
MONOCYTES # BLD AUTO: 1.23 10*3/MM3 (ref 0.1–0.9)
MONOCYTES NFR BLD AUTO: 8.6 % (ref 5–12)
NEUTROPHILS NFR BLD AUTO: 10.86 10*3/MM3 (ref 1.7–7)
NEUTROPHILS NFR BLD AUTO: 76.3 % (ref 42.7–76)
NITRITE UR QL STRIP: NEGATIVE
NT-PROBNP SERPL-MCNC: ABNORMAL PG/ML (ref 0–1800)
PH UR STRIP.AUTO: 5.5 [PH] (ref 5–8)
PLATELET # BLD AUTO: 278 10*3/MM3 (ref 140–450)
PMV BLD AUTO: 8.9 FL (ref 6–12)
PROT UR QL STRIP: NEGATIVE
RBC # BLD AUTO: 4.08 10*6/MM3 (ref 3.77–5.28)
RBC # UR STRIP: ABNORMAL /HPF
REF LAB TEST METHOD: ABNORMAL
SP GR UR STRIP: 1.02 (ref 1–1.03)
SQUAMOUS #/AREA URNS HPF: ABNORMAL /HPF
UROBILINOGEN UR QL STRIP: ABNORMAL
WBC # UR STRIP: ABNORMAL /HPF
WBC NRBC COR # BLD: 14.23 10*3/MM3 (ref 3.4–10.8)

## 2022-12-27 PROCEDURE — 99214 OFFICE O/P EST MOD 30 MIN: CPT | Performed by: NURSE PRACTITIONER

## 2022-12-27 PROCEDURE — 85025 COMPLETE CBC W/AUTO DIFF WBC: CPT

## 2022-12-27 PROCEDURE — 81001 URINALYSIS AUTO W/SCOPE: CPT

## 2022-12-27 PROCEDURE — 87086 URINE CULTURE/COLONY COUNT: CPT

## 2022-12-27 PROCEDURE — 83880 ASSAY OF NATRIURETIC PEPTIDE: CPT

## 2022-12-27 PROCEDURE — 36415 COLL VENOUS BLD VENIPUNCTURE: CPT

## 2022-12-27 PROCEDURE — 71046 X-RAY EXAM CHEST 2 VIEWS: CPT

## 2022-12-27 PROCEDURE — 85379 FIBRIN DEGRADATION QUANT: CPT

## 2022-12-27 RX ORDER — NYSTATIN 100000 U/G
1 CREAM TOPICAL 2 TIMES DAILY
Qty: 30 G | Refills: 1 | Status: SHIPPED | OUTPATIENT
Start: 2022-12-27

## 2022-12-27 RX ORDER — NITROFURANTOIN 25; 75 MG/1; MG/1
100 CAPSULE ORAL 2 TIMES DAILY
Qty: 10 CAPSULE | Refills: 0 | Status: SHIPPED | OUTPATIENT
Start: 2022-12-27 | End: 2023-01-01

## 2022-12-27 NOTE — PROGRESS NOTES
Radha Arana presents to [facility] with complaint of  Shortness of Breath (Pt states she fell about 3 weeks ago, she fell straight back and hit a cabinet with her right shoulder. )    SUBJECTIVE  History of Present Illness     Patient presents today with chief complaint of shortness of breath, weakness, and overall sense of just not feeling well.  Patient's daughter is present with her for today's visit.  Patient's daughter says that on Hector day she really noticed her mother having increased shortness of air.  She had debated on taking her to the emergency room but opted not to. Patient is on long-term oxygen use.  Up until last month, she says she had only had to use her oxygen at 1 liter at night when she was sleeping.  Over the past week or so, the patient has noted she is required oxygen use during the daytime and is wearing 2 L.  Sunday O2 was 90% but daughter reports that it is always difficult to obtain accurate oxygen level on her other fingers are always cold.  Patient does have atrial fibrillation and CHF.  Patient's daughter had checked heart rate at home and she reports that it was fluctuating 1 minute in the 80s, then into the 150s, then come back down to the 80s.  In regards to the fatigue, her primary care provider ordered for her to have physical therapy which she started in November.  Patient reports that this was helping but now she feels she cannot keep up with PT as it feels strenuous to her and makes her out of breath.  Patient also reports that 3 weeks ago she fell straight back and landed on a cabinet hitting her right shoulder.  She does not report any injury or pain to her right shoulder.  She has normal range of motion of the shoulder.  She says she is not sure why she fell, she denies ever losing consciousness.  Patient also reports soreness under her left breast.  She says she feels like her rib is bothering her.     Patient denies any chest pain,  abdominal pain, dizziness, headache, vision changes, dysuria, fever, nausea, vomiting, diarrhea, or syncopal events.    The following portions of the patient's history were reviewed and updated as appropriate: allergies, current medications, past family history, past medical history, past social history, past surgical history and problem list.    OBJECTIVE  Vital Signs:   /95 (BP Location: Right arm, Patient Position: Sitting)   Pulse 76   Temp 99.3 °F (37.4 °C) (Oral)   SpO2 90% Comment: 2 liters      Physical Exam  Vitals reviewed.   Constitutional:       General: She is not in acute distress.     Appearance: Normal appearance. She is not ill-appearing.   HENT:      Head: Normocephalic and atraumatic.      Nose: Nose normal.      Mouth/Throat:      Mouth: Mucous membranes are moist.      Pharynx: Oropharynx is clear.   Cardiovascular:      Rate and Rhythm: Normal rate and regular rhythm.      Pulses: Normal pulses.      Heart sounds: Normal heart sounds.   Pulmonary:      Effort: Pulmonary effort is normal. Prolonged expiration present. No accessory muscle usage or respiratory distress.      Breath sounds: Normal breath sounds. No decreased air movement.   Musculoskeletal:      Cervical back: Neck supple.      Right lower leg: No edema.      Left lower leg: No edema.   Skin:     General: Skin is warm and dry.      Findings: Erythema present. Injury: Noted under bilateral breast, also has small amount of moisture present.   Neurological:      General: No focal deficit present.      Mental Status: She is alert and oriented to person, place, and time. Mental status is at baseline.   Psychiatric:         Mood and Affect: Mood normal.         Behavior: Behavior normal.         Judgment: Judgment normal.          Results Review:  The following data was reviewed by Darshana Chapman, ANCELMO [unfilled] 14:45 EST.    Lab on 12/27/2022   Component Date Value Ref Range Status   • proBNP 12/27/2022 18,453.0 (H)  0.0 - 1,800.0  pg/mL Final   • D-Dimer, Quantitative 12/27/2022 0.49  0.00 - 0.84 MCGFEU/mL Final   • Color, UA 12/27/2022 Yellow  Yellow, Straw Final   • Appearance, UA 12/27/2022 Cloudy (A)  Clear Final   • pH, UA 12/27/2022 5.5  5.0 - 8.0 Final   • Specific Gravity, UA 12/27/2022 1.020  1.005 - 1.030 Final   • Glucose, UA 12/27/2022 Negative  Negative Final   • Ketones, UA 12/27/2022 Negative  Negative Final   • Bilirubin, UA 12/27/2022 Negative  Negative Final   • Blood, UA 12/27/2022 Trace (A)  Negative Final   • Protein, UA 12/27/2022 Negative  Negative Final   • Leuk Esterase, UA 12/27/2022 Large (3+) (A)  Negative Final   • Nitrite, UA 12/27/2022 Negative  Negative Final   • Urobilinogen, UA 12/27/2022 0.2 E.U./dL  0.2 - 1.0 E.U./dL Final   • WBC 12/27/2022 14.23 (H)  3.40 - 10.80 10*3/mm3 Final   • RBC 12/27/2022 4.08  3.77 - 5.28 10*6/mm3 Final   • Hemoglobin 12/27/2022 11.8 (L)  12.0 - 15.9 g/dL Final   • Hematocrit 12/27/2022 38.0  34.0 - 46.6 % Final   • MCV 12/27/2022 93.1  79.0 - 97.0 fL Final   • MCH 12/27/2022 28.9  26.6 - 33.0 pg Final   • MCHC 12/27/2022 31.1 (L)  31.5 - 35.7 g/dL Final   • RDW 12/27/2022 14.2  12.3 - 15.4 % Final   • RDW-SD 12/27/2022 48.7  37.0 - 54.0 fl Final   • MPV 12/27/2022 8.9  6.0 - 12.0 fL Final   • Platelets 12/27/2022 278  140 - 450 10*3/mm3 Final   • Neutrophil % 12/27/2022 76.3 (H)  42.7 - 76.0 % Final   • Lymphocyte % 12/27/2022 12.8 (L)  19.6 - 45.3 % Final   • Monocyte % 12/27/2022 8.6  5.0 - 12.0 % Final   • Eosinophil % 12/27/2022 1.5  0.3 - 6.2 % Final   • Basophil % 12/27/2022 0.4  0.0 - 1.5 % Final   • Immature Grans % 12/27/2022 0.4  0.0 - 0.5 % Final   • Neutrophils, Absolute 12/27/2022 10.86 (H)  1.70 - 7.00 10*3/mm3 Final   • Lymphocytes, Absolute 12/27/2022 1.82  0.70 - 3.10 10*3/mm3 Final   • Monocytes, Absolute 12/27/2022 1.23 (H)  0.10 - 0.90 10*3/mm3 Final   • Eosinophils, Absolute 12/27/2022 0.21  0.00 - 0.40 10*3/mm3 Final   • Basophils, Absolute 12/27/2022 0.06   0.00 - 0.20 10*3/mm3 Final   • Immature Grans, Absolute 12/27/2022 0.05  0.00 - 0.05 10*3/mm3 Final   • RBC, UA 12/27/2022 0-2 (A)  None Seen /HPF Final   • WBC, UA 12/27/2022 31-50 (A)  None Seen /HPF Final   • Bacteria, UA 12/27/2022 1+ (A)  None Seen /HPF Final   • Squamous Epithelial Cells, UA 12/27/2022 7-12 (A)  None Seen, 0-2 /HPF Final   • Methodology 12/27/2022 Manual Light Microscopy   Final   • Urine Culture 12/27/2022 No growth   Final     Narrative & Impression   PROCEDURE:  XR CHEST PA AND LATERAL     COMPARISON: [facility] BRISEYDA REDMAN, CHEST PA/AP & LAT 2V, 11/27/2019, 14:21.     INDICATIONS:  SHORTNESS OF BREATH     FINDINGS:          The heart is enlarged.  The pulmonary vascular markings are normal.  There is mild chronic   interstitial change.  There are no focal infiltrates.  The osseous structures are normal.     IMPRESSION:               Cardiomegaly.  No active disease.            MONCHO CHAPMAN MD         Electronically Signed and Approved By: MONCHO CHAPMAN MD on 12/27/2022 at 15:30                    ASSESSMENT AND PLAN:  Diagnoses and all orders for this visit:    1. Shortness of breath (Primary)  -     CBC w AUTO Differential; Future  -     proBNP; Future  -     XR Chest PA & Lateral; Future  -     D-dimer, Quantitative; Future    2. Weakness  -     Urinalysis With Culture If Indicated -; Future    3. Acute UTI  Comments:  Start Macrobid, culture pending  Orders:  -     nitrofurantoin, macrocrystal-monohydrate, (Macrobid) 100 MG capsule; Take 1 capsule by mouth 2 (Two) Times a Day for 5 days.  Dispense: 10 capsule; Refill: 0    4. Intertrigo  Comments:  Wash under breast with soap and water twice per day, dry skin, apply nystatin cream, may apply pillowcases under breasts to help with friction and moisture   Orders:  -     nystatin (MYCOSTATIN) 647272 UNIT/GM cream; Apply 1 application topically to the appropriate area as directed 2 (Two) Times a Day.  Dispense: 30 g; Refill: 1    5.  Acute on chronic congestive heart failure, unspecified heart failure type (HCC)  Assessment & Plan:  BNP 18,453, suspect that this is cause for her shortness of air and weakness.  Calling patient today to see how she is doing.  If she is still having continued worsening of air she will be directed to go to the ER, shortness of air is stable/improved recommend cardio follow up, HR reg, no edema on today's exam, chest x-ray does not show any pleural effusions          Follow Up   Return if symptoms worsen or fail to improve. Patient to notify office with any acute concerns or issues.  Patient verbalizes understanding, agrees with plan of care and has no further questions upon discharge.     Patient was given instructions and counseling regarding her condition or for health maintenance advice. Please see specific information pulled into the AVS if appropriate.     Discussed the importance of following up with any needed screening tests/labs/specialist appointments and any requested follow-up recommended by me today. Importance of maintaining follow-up discussed and patient accepts that missed appointments can delay diagnosis and potentially lead to worsening of conditions.

## 2022-12-28 LAB — BACTERIA SPEC AEROBE CULT: NO GROWTH

## 2022-12-29 PROBLEM — I50.9 ACUTE ON CHRONIC CONGESTIVE HEART FAILURE: Status: ACTIVE | Noted: 2022-12-29

## 2022-12-29 NOTE — ASSESSMENT & PLAN NOTE
BNP 18,453, suspect that this is cause for her shortness of air and weakness.  Calling patient today to see how she is doing.  If she is still having continued worsening of air she will be directed to go to the ER, shortness of air is stable/improved recommend cardio follow up, HR reg, no edema on today's exam, chest x-ray does not show any pleural effusions

## 2023-01-06 ENCOUNTER — TELEPHONE (OUTPATIENT)
Dept: FAMILY MEDICINE CLINIC | Age: 83
End: 2023-01-06

## 2023-01-06 ENCOUNTER — READMISSION MANAGEMENT (OUTPATIENT)
Dept: CALL CENTER | Facility: HOSPITAL | Age: 83
End: 2023-01-06
Payer: MEDICARE

## 2023-01-06 RX ORDER — FUROSEMIDE 40 MG/1
TABLET ORAL
Qty: 90 TABLET | Refills: 1 | Status: SHIPPED | OUTPATIENT
Start: 2023-01-06

## 2023-01-06 NOTE — OUTREACH NOTE
Prep Survey    Flowsheet Row Responses   [facility] facility patient discharged from? Non-[facility]   Is LACE score < 7 ? Non-BH Discharge   Eligibility [facility]   Date of Discharge 01/06/23   Discharge Disposition Home or Self Care   Discharge diagnosis MI   Does the patient have one of the following disease processes/diagnoses(primary or secondary)? Acute MI (STEMI,NSTEMI)   Prep survey completed? Yes          WILVER MCKEON - Registered Nurse

## 2023-01-06 NOTE — TELEPHONE ENCOUNTER
Caller: [facility]    Relationship to patient: Other    Best call back number:  [phone]    New or established patient?  [] New  [x] Established    Date of discharge: 1-06-23    Facility discharged from: [facility]     Diagnosis/Symptoms: HEART ATTACK     Length of stay (If applicable): 7 DAYS     Specialty Only: Did you see a Religion health provider?    [] Yes  [x] No

## 2023-01-09 ENCOUNTER — TRANSITIONAL CARE MANAGEMENT TELEPHONE ENCOUNTER (OUTPATIENT)
Dept: CALL CENTER | Facility: HOSPITAL | Age: 83
End: 2023-01-09
Payer: MEDICARE

## 2023-01-09 NOTE — OUTREACH NOTE
Call Center TCM Note    Flowsheet Row Responses   [facility] patient discharged from? Non-BH   Does the patient have one of the following disease processes/diagnoses(primary or secondary)? Acute MI (STEMI,NSTEMI)   TCM attempt successful? Yes  [no verbal]   Call start time 0936   Call end time 0938   Discharge diagnosis MI   Is patient permission given to speak with other caregiver? Yes   List who call center can speak with Daughter   Person spoke with today (if not patient) and relationship pt   Meds reviewed with patient/caregiver? Yes   Is the patient having any side effects they believe may be caused by any medication additions or changes? No   Does the patient have all medications ordered at discharge? Yes   Is the patient taking all medications as directed (includes completed medication regime)? Yes   Comments HOSP DC FU appt 1/12/23 @ 2:45 pm   Does the patient have an appointment with their PCP within 7 days of discharge? Yes   Has home health visited the patient within 72 hours of discharge? N/A   Psychosocial issues? No   Did the patient receive a copy of their discharge instructions? Yes   Nursing interventions Reviewed instructions with patient   What is the patient's perception of their health status since discharge? Improving   Is the patient/caregiver able to teach back signs and symptoms related to disease process for when to call PCP? Yes   Is the patient/caregiver able to teach back signs and symptoms related to disease process for when to call 911? Yes   Is the patient/caregiver able to teach back the hierarchy of who to call/visit for symptoms/problems? PCP, Specialist, Home health nurse, Urgent Care, ED, 911 Yes   TCM call completed? Yes   Wrap up additional comments Pt reports she is improving and cath site looks fine, No issues. Pt states that her daughter handles all of her appts.    Call end time 0938          Veronica Willingham RN    1/9/2023, 09:39 EST

## 2023-01-12 ENCOUNTER — OFFICE VISIT (OUTPATIENT)
Dept: FAMILY MEDICINE CLINIC | Age: 83
End: 2023-01-12
Payer: MEDICARE

## 2023-01-12 VITALS
OXYGEN SATURATION: 94 % | HEIGHT: 61 IN | SYSTOLIC BLOOD PRESSURE: 136 MMHG | WEIGHT: 194 LBS | BODY MASS INDEX: 36.63 KG/M2 | DIASTOLIC BLOOD PRESSURE: 67 MMHG | HEART RATE: 63 BPM

## 2023-01-12 DIAGNOSIS — E11.9 TYPE 2 DIABETES MELLITUS WITHOUT COMPLICATION, WITHOUT LONG-TERM CURRENT USE OF INSULIN: ICD-10-CM

## 2023-01-12 DIAGNOSIS — M15.0 PRIMARY GENERALIZED (OSTEO)ARTHRITIS: ICD-10-CM

## 2023-01-12 DIAGNOSIS — I48.0 PAROXYSMAL ATRIAL FIBRILLATION: ICD-10-CM

## 2023-01-12 DIAGNOSIS — Z95.5 HISTORY OF CORONARY ANGIOPLASTY WITH INSERTION OF STENT: Primary | ICD-10-CM

## 2023-01-12 DIAGNOSIS — I25.10 CORONARY ARTERY DISEASE INVOLVING NATIVE CORONARY ARTERY OF NATIVE HEART WITHOUT ANGINA PECTORIS: ICD-10-CM

## 2023-01-12 DIAGNOSIS — E03.9 ACQUIRED HYPOTHYROIDISM: ICD-10-CM

## 2023-01-12 DIAGNOSIS — I50.23 ACUTE ON CHRONIC SYSTOLIC CONGESTIVE HEART FAILURE: ICD-10-CM

## 2023-01-12 PROCEDURE — 99214 OFFICE O/P EST MOD 30 MIN: CPT | Performed by: FAMILY MEDICINE

## 2023-01-12 PROCEDURE — 1111F DSCHRG MED/CURRENT MED MERGE: CPT | Performed by: FAMILY MEDICINE

## 2023-01-12 RX ORDER — CLOPIDOGREL BISULFATE 75 MG/1
TABLET ORAL
COMMUNITY
Start: 2023-01-06

## 2023-01-12 RX ORDER — ASPIRIN 81 MG/1
81 TABLET, CHEWABLE ORAL
COMMUNITY
Start: 2023-01-06

## 2023-01-12 NOTE — PROGRESS NOTES
"Chief Complaint  Hospital Follow Up Visit (Following up from a heart attack)    Subjective          Radha Arana presents to [facility]  History of Present Illness  Accompanied by her daughter Honey    On Hector Day she wasn't her usual self, looked tired. Got increasingly SOA as the day went on. Went home and took breathing treatment.   Saw Darshana here on 12/27  Went to [facility] she felt so SOA   Then transferred to [facility] with [facility], then PCI of RCA  Had Stent placed.   She is to follow up with Dr. Gayle on Feb 1st.   Added Plavix and ASA  Breathing is better since on O2 all the time  Does have GANDHI  Sleeps with head of bed up a little bit at night- using daughter's hosp bed.  Would like a new bed as current bed rails or not usable.        Current Outpatient Medications   Medication Sig Dispense Refill   • albuterol sulfate  (90 Base) MCG/ACT inhaler Inhale 2 puffs Every 4 (Four) Hours As Needed for Wheezing or Shortness of Air. 18 g 0   • aspirin 81 MG chewable tablet 81 mg.     • clopidogrel (PLAVIX) 75 MG tablet      • colestipol (COLESTID) 1 g tablet TAKE ONE TABLET BY MOUTH TWICE DAILY AS NEEDED 60 tablet 0   • DULoxetine (CYMBALTA) 60 MG capsule Take 1 capsule by mouth Daily. 90 capsule 1   • Eliquis 5 MG tablet tablet 5 mg Every 12 (Twelve) Hours.     • furosemide (LASIX) 40 MG tablet TAKE ONE TABLET BY MOUTH EVERY DAY 90 tablet 1   • gabapentin (NEURONTIN) 300 MG capsule TAKE ONE CAPSULE BY MOUTH TWICE DAILY 60 capsule 2   • Gauze Pads & Dressings (Telfa Non-Adherent) 3\"X4\" pads 1 pad Daily. 14 each 0   • guaiFENesin (MUCINEX) 600 MG 12 hr tablet Take 1,200 mg by mouth.     • ipratropium-albuterol (DUO-NEB) 0.5-2.5 mg/3 ml nebulizer Take 3 mL by nebulization Every 4 (Four) Hours As Needed.     • levothyroxine (SYNTHROID, LEVOTHROID) 25 MCG tablet TAKE ONE TABLET BY MOUTH EVERY MORNING 90 tablet 1   • lisinopril (PRINIVIL,ZESTRIL) 10 MG tablet Take 1 " "tablet by mouth Daily. 90 tablet 1   • Melatonin 10 MG tablet Take 10 mg by mouth Every Night.     • metoprolol succinate XL (TOPROL-XL) 50 MG 24 hr tablet TAKE ONE TABLET BY MOUTH EVERY DAY 90 tablet 1   • multivitamin with minerals tablet tablet Take 1 tablet by mouth Daily.     • nystatin (MYCOSTATIN) 680616 UNIT/GM cream Apply 1 application topically to the appropriate area as directed 2 (Two) Times a Day. 30 g 1   • O2 (OXYGEN) Inhale 1 (One) Time.     • omeprazole (priLOSEC) 40 MG capsule TAKE ONE CAPSULE BY MOUTH EVERY DAY 90 capsule 1   • oxybutynin XL (DITROPAN XL) 15 MG 24 hr tablet Take 1 tablet by mouth Daily. 90 tablet 1   • potassium chloride (K-DUR,KLOR-CON) 20 MEQ CR tablet TAKE ONE TABLET BY MOUTH TWICE DAILY 180 tablet 1   • Probiotic Product (PROBIOTIC DAILY PO) Take  by mouth.       No current facility-administered medications for this visit.       Review of Systems         Objective   Vital Signs:   /67 (BP Location: Left arm, Patient Position: Sitting, Cuff Size: Large Adult)   Pulse 63   Ht 156.2 cm (61.5\")   Wt 88 kg (194 lb)   SpO2 94%   BMI 36.07 kg/m²     Physical Exam   No acute distress  In wheelchair  Obese  Oxygen in place  Color is good  No accessory muscle use  Tympanic membranes clear  Oropharynx clear  Heart is regular rhythm with 2/6 systolic murmur  Lungs with fair airflow, no wheeze or crackles  Lower extremities trace edema at the ankles  Neurologic no lateralizing gross focal deficits      Result Review :                     Assessment and Plan    Diagnoses and all orders for this visit:    1. History of coronary angioplasty with insertion of stent (Primary)  Assessment & Plan:  Reiterated the importance of antiplatelet therapy for at least 1 year      2. Coronary artery disease involving native coronary artery of native heart without angina pectoris  Comments:  Continue on her current regimen.  Follow-up with cardiology as recommended.  Supplemental oxygen.    3. " Paroxysmal atrial fibrillation (HCC)  Assessment & Plan:  Rate is controlled.  Anticoagulated.      4. Type 2 diabetes mellitus without complication, without long-term current use of insulin (HCC)  Assessment & Plan:  Continue on current regimen.  Last labs look okay.  She had a bunch of blood drawn at hospitalization so we will hold off on blood work today.      5. Acute on chronic systolic congestive heart failure (HCC)  -     Hospital Bed  -     Heavy Duty Wheelchair    6. Acquired hypothyroidism  Assessment & Plan:  Last lab looked okay.  Continue on current regimen      7. Primary generalized (osteo)arthritis  Assessment & Plan:  Could benefit from home health physical therapy.  We will get wheelchair for use within the home to assist with ADLs and hospital bed as she needs positioning of the body in ways not feasible with an ordinary bed    Orders:  -     Hospital Bed  -     Heavy Duty Wheelchair      Follow Up   No follow-ups on file.  Patient was given instructions and counseling regarding her condition or for health maintenance advice. Please see specific information pulled into the AVS if appropriate.

## 2023-01-14 NOTE — ASSESSMENT & PLAN NOTE
Could benefit from home health physical therapy.  We will get wheelchair for use within the home to assist with ADLs and hospital bed as she needs positioning of the body in ways not feasible with an ordinary bed, and requires frequent changes in body position

## 2023-01-14 NOTE — ASSESSMENT & PLAN NOTE
Continue on current regimen.  Last labs look okay.  She had a bunch of blood drawn at hospitalization so we will hold off on blood work today.

## 2023-01-23 ENCOUNTER — TELEPHONE (OUTPATIENT)
Dept: FAMILY MEDICINE CLINIC | Age: 83
End: 2023-01-23
Payer: MEDICARE

## 2023-01-23 NOTE — TELEPHONE ENCOUNTER
Irais aguiar/SHAWN states that they admitted her and will see her 1-2 times per week for nursing.  They are also going to have PT go and do an evaluation. She is asking how many times a day or week does she need to be checking her blood sugar.  Pt has not had a meter in a while, so it has not been checked.

## 2023-01-24 NOTE — TELEPHONE ENCOUNTER
She really does not need to check her blood sugars on any kind of a schedule.  Her hemoglobin A1c is good.  If she wants to check her blood sugars out of curiosity that would be fine, but is really not necessary as long as she remains under good control

## 2023-01-26 ENCOUNTER — TELEPHONE (OUTPATIENT)
Dept: FAMILY MEDICINE CLINIC | Age: 83
End: 2023-01-26
Payer: MEDICARE

## 2023-01-26 NOTE — TELEPHONE ENCOUNTER
"Standard is fine.  Think I suggested to the family they could drive-by [place] and be \"fitted\" for the correct chair  "

## 2023-01-26 NOTE — TELEPHONE ENCOUNTER
Kailey Domingo called re: the wheelchair order.  She is clarifying if you want a delon height wc or heavy weight wc.  She does not qualify for a heavy duty because she is under 250 lbs.  Or do you want a standard wc?    Fax [phone]

## 2023-01-30 ENCOUNTER — TELEPHONE (OUTPATIENT)
Dept: FAMILY MEDICINE CLINIC | Age: 83
End: 2023-01-30
Payer: MEDICARE

## 2023-01-31 ENCOUNTER — TELEPHONE (OUTPATIENT)
Dept: FAMILY MEDICINE CLINIC | Age: 83
End: 2023-01-31

## 2023-02-02 RX ORDER — IPRATROPIUM BROMIDE AND ALBUTEROL SULFATE 2.5; .5 MG/3ML; MG/3ML
3 SOLUTION RESPIRATORY (INHALATION) EVERY 4 HOURS PRN
Qty: 360 ML | Refills: 1 | Status: SHIPPED | OUTPATIENT
Start: 2023-02-02

## 2023-02-06 RX ORDER — OXYBUTYNIN CHLORIDE 15 MG/1
TABLET, EXTENDED RELEASE ORAL
Qty: 90 TABLET | Refills: 1 | Status: SHIPPED | OUTPATIENT
Start: 2023-02-06

## 2023-02-06 RX ORDER — LISINOPRIL 10 MG/1
TABLET ORAL
Qty: 90 TABLET | Refills: 1 | Status: SHIPPED | OUTPATIENT
Start: 2023-02-06

## 2023-02-06 RX ORDER — DULOXETIN HYDROCHLORIDE 60 MG/1
CAPSULE, DELAYED RELEASE ORAL
Qty: 90 CAPSULE | Refills: 1 | Status: SHIPPED | OUTPATIENT
Start: 2023-02-06

## 2023-02-07 ENCOUNTER — TELEPHONE (OUTPATIENT)
Dept: FAMILY MEDICINE CLINIC | Age: 83
End: 2023-02-07
Payer: MEDICARE

## 2023-02-07 DIAGNOSIS — R06.2 WHEEZE: Primary | ICD-10-CM

## 2023-02-07 RX ORDER — IPRATROPIUM/ALBUTEROL SULFATE 20-100 MCG
1 MIST INHALER (GRAM) INHALATION 4 TIMES DAILY PRN
Qty: 4 G | Refills: 1 | Status: SHIPPED | OUTPATIENT
Start: 2023-02-07

## 2023-02-07 NOTE — TELEPHONE ENCOUNTER
Pt called stating that [facility] is on backorder and she is asking if you will send in something else to [facility]

## 2023-02-07 NOTE — TELEPHONE ENCOUNTER
I sent in an inhaler for her to try.  She needs instruction on how to use the inhaler her daughter should be able to help.  There is a YouTube instructional video  We can return to the nebulizer if necessary once supplies are replenished at the pharmacy if she prefers the nebulizer

## 2023-02-20 PROCEDURE — G0180 MD CERTIFICATION HHA PATIENT: HCPCS | Performed by: FAMILY MEDICINE

## 2023-02-27 ENCOUNTER — OUTSIDE FACILITY SERVICE (OUTPATIENT)
Dept: FAMILY MEDICINE CLINIC | Age: 83
End: 2023-02-27
Payer: MEDICARE

## 2023-03-07 DIAGNOSIS — M17.0 PRIMARY OSTEOARTHRITIS OF BOTH KNEES: ICD-10-CM

## 2023-03-08 RX ORDER — GABAPENTIN 300 MG/1
CAPSULE ORAL
Qty: 60 CAPSULE | Refills: 2 | Status: SHIPPED | OUTPATIENT
Start: 2023-03-08

## 2023-03-08 NOTE — TELEPHONE ENCOUNTER
Rx Refill Note  Requested Prescriptions     Pending Prescriptions Disp Refills   • gabapentin (NEURONTIN) 300 MG capsule [Pharmacy Med Name: gabapentin 300 mg capsule] 60 capsule 2     Sig: TAKE ONE CAPSULE BY MOUTH TWICE DAILY      Last office visit with prescribing clinician: 1/12/2023     Next office visit with prescribing clinician: 5/19/2023     12/9/22 #60 with 2 RF's  UDS 11/18/22      Tita Schmidt LPN  03/08/23, 10:45 EST

## 2023-03-30 ENCOUNTER — TELEPHONE (OUTPATIENT)
Dept: FAMILY MEDICINE CLINIC | Age: 83
End: 2023-03-30
Payer: MEDICARE

## 2023-03-30 DIAGNOSIS — E11.9 TYPE 2 DIABETES MELLITUS WITHOUT COMPLICATION, WITHOUT LONG-TERM CURRENT USE OF INSULIN: Primary | ICD-10-CM

## 2023-03-30 NOTE — TELEPHONE ENCOUNTER
"      Caller: Radha Arana \"Shayy\"    Relationship: Self    Best call back number: [phone]    Requested Prescriptions:   GLUCOSE TEST STRIPS     Pharmacy where request should be sent: [address] [facility] - [phone]  - [phone] FX     Last office visit with prescribing clinician: 1/12/2023   Last telemedicine visit with prescribing clinician: 5/19/2023   Next office visit with prescribing clinician: 5/19/2023     Additional details provided by patient:     Does the patient have less than a 3 day supply:  [x] Yes  [] No    Would you like a call back once the refill request has been completed: [x] Yes [] No    If the office needs to give you a call back, can they leave a voicemail: [x] Yes [] No    Anton Monaco Rep   03/30/23 15:45 EDT     "

## 2023-05-03 ENCOUNTER — TELEPHONE (OUTPATIENT)
Dept: FAMILY MEDICINE CLINIC | Age: 83
End: 2023-05-03

## 2023-05-03 NOTE — TELEPHONE ENCOUNTER
"    Caller: Radha Arana \"Shayy\"    Relationship: Self    Best call back number: [phone]    What is the medical concern/diagnosis: SCIATIC PAIN    What specialty or service is being requested: PHYSICAL THERAPY- HOME HEALTH    What is the provider, practice or medical service name: PCP RECOMMENDATION    Any additional details: THE PATIENT WOULD LIKE A HOME HEALTH REFERRAL FOR PHYSICAL THERAPY FOR HER SCIATIC PAIN AND A CALL BACK TO [facility]      "

## 2023-05-05 NOTE — TELEPHONE ENCOUNTER
We will need to wait for her office visit and evaluation since the ER documentation does not support the referral (states that gait is intact)

## 2023-05-19 ENCOUNTER — LAB (OUTPATIENT)
Dept: LAB | Facility: HOSPITAL | Age: 83
End: 2023-05-19
Payer: MEDICARE

## 2023-05-19 ENCOUNTER — OFFICE VISIT (OUTPATIENT)
Dept: FAMILY MEDICINE CLINIC | Age: 83
End: 2023-05-19
Payer: MEDICARE

## 2023-05-19 ENCOUNTER — TELEPHONE (OUTPATIENT)
Dept: FAMILY MEDICINE CLINIC | Age: 83
End: 2023-05-19

## 2023-05-19 VITALS
HEART RATE: 94 BPM | HEIGHT: 65 IN | BODY MASS INDEX: 32.15 KG/M2 | OXYGEN SATURATION: 98 % | WEIGHT: 193 LBS | SYSTOLIC BLOOD PRESSURE: 108 MMHG | DIASTOLIC BLOOD PRESSURE: 58 MMHG

## 2023-05-19 DIAGNOSIS — I50.42 CHRONIC COMBINED SYSTOLIC AND DIASTOLIC HEART FAILURE: ICD-10-CM

## 2023-05-19 DIAGNOSIS — Z23 NEED FOR VACCINATION: ICD-10-CM

## 2023-05-19 DIAGNOSIS — I50.42 CHRONIC COMBINED SYSTOLIC AND DIASTOLIC HEART FAILURE: Primary | ICD-10-CM

## 2023-05-19 DIAGNOSIS — R06.2 WHEEZE: ICD-10-CM

## 2023-05-19 DIAGNOSIS — I48.0 PAROXYSMAL ATRIAL FIBRILLATION: ICD-10-CM

## 2023-05-19 DIAGNOSIS — I25.10 CORONARY ARTERY DISEASE INVOLVING NATIVE CORONARY ARTERY OF NATIVE HEART WITHOUT ANGINA PECTORIS: ICD-10-CM

## 2023-05-19 DIAGNOSIS — R26.81 GAIT INSTABILITY: ICD-10-CM

## 2023-05-19 DIAGNOSIS — E11.9 TYPE 2 DIABETES MELLITUS WITHOUT COMPLICATION, WITHOUT LONG-TERM CURRENT USE OF INSULIN: Primary | ICD-10-CM

## 2023-05-19 DIAGNOSIS — E03.9 ACQUIRED HYPOTHYROIDISM: ICD-10-CM

## 2023-05-19 LAB
ALBUMIN SERPL-MCNC: 3.9 G/DL (ref 3.5–5.2)
ALBUMIN/GLOB SERPL: 1.4 G/DL
ALP SERPL-CCNC: 82 U/L (ref 39–117)
ALT SERPL W P-5'-P-CCNC: 19 U/L (ref 1–33)
ANION GAP SERPL CALCULATED.3IONS-SCNC: 9.3 MMOL/L (ref 5–15)
AST SERPL-CCNC: 16 U/L (ref 1–32)
BILIRUB SERPL-MCNC: <0.2 MG/DL (ref 0–1.2)
BUN SERPL-MCNC: 17 MG/DL (ref 8–23)
BUN/CREAT SERPL: 23 (ref 7–25)
CALCIUM SPEC-SCNC: 9.4 MG/DL (ref 8.6–10.5)
CHLORIDE SERPL-SCNC: 99 MMOL/L (ref 98–107)
CHOLEST SERPL-MCNC: 148 MG/DL (ref 0–200)
CO2 SERPL-SCNC: 26.7 MMOL/L (ref 22–29)
CREAT SERPL-MCNC: 0.74 MG/DL (ref 0.57–1)
EGFRCR SERPLBLD CKD-EPI 2021: 79.4 ML/MIN/1.73
GLOBULIN UR ELPH-MCNC: 2.7 GM/DL
GLUCOSE SERPL-MCNC: 93 MG/DL (ref 65–99)
HDLC SERPL-MCNC: 58 MG/DL (ref 40–60)
LDLC SERPL CALC-MCNC: 68 MG/DL (ref 0–100)
LDLC/HDLC SERPL: 1.12 {RATIO}
POTASSIUM SERPL-SCNC: 4.7 MMOL/L (ref 3.5–5.2)
PROT SERPL-MCNC: 6.6 G/DL (ref 6–8.5)
SODIUM SERPL-SCNC: 135 MMOL/L (ref 136–145)
TRIGL SERPL-MCNC: 125 MG/DL (ref 0–150)
VLDLC SERPL-MCNC: 22 MG/DL (ref 5–40)

## 2023-05-19 PROCEDURE — 80053 COMPREHEN METABOLIC PANEL: CPT | Performed by: FAMILY MEDICINE

## 2023-05-19 PROCEDURE — 80061 LIPID PANEL: CPT | Performed by: FAMILY MEDICINE

## 2023-05-19 PROCEDURE — 36415 COLL VENOUS BLD VENIPUNCTURE: CPT | Performed by: FAMILY MEDICINE

## 2023-05-19 RX ORDER — FLUCONAZOLE 100 MG/1
TABLET ORAL
COMMUNITY
Start: 2023-04-12

## 2023-05-19 RX ORDER — METHYLPREDNISOLONE 4 MG/1
TABLET ORAL
COMMUNITY
Start: 2023-04-28

## 2023-05-19 RX ORDER — CLOPIDOGREL BISULFATE 75 MG/1
75 TABLET ORAL DAILY
COMMUNITY
Start: 2023-01-06

## 2023-05-19 RX ORDER — IPRATROPIUM BROMIDE AND ALBUTEROL SULFATE 2.5; .5 MG/3ML; MG/3ML
3 SOLUTION RESPIRATORY (INHALATION) EVERY 4 HOURS PRN
Qty: 360 ML | Refills: 1 | Status: SHIPPED | OUTPATIENT
Start: 2023-05-19

## 2023-05-19 RX ORDER — ATORVASTATIN CALCIUM 80 MG/1
TABLET, FILM COATED ORAL
COMMUNITY
Start: 2023-05-08

## 2023-05-19 NOTE — TELEPHONE ENCOUNTER
Please consult [facility] for physical therapy  Diagnosis of fall, gait disturbance, recent history of MI and CHF

## 2023-05-19 NOTE — PROGRESS NOTES
Chief Complaint  Hypertension and Hypothyroidism (6 month f/u. )    Subjective          Radha Arana presents to [facility]  History of Present Illness  She is accompanied by her daughter Honey.    Had PCI and stent placement January 5, 2023.  Had follow-up with cardiology in March 15 and that note is reviewed.  Maintained on Plavix.  She say she feels bloated.  Her ankles are swollen some and eyes noted to be swollen.  Was taken off of metoprolol by a Dr. Gayle as her blood pressure was consistently staying under 100.  She used to have separate prescription for furosemide 20 mg tablet she would just take on a as needed basis for worsening swelling increasing weight but does not have that available currently.  She has noted some dyspnea on exertion but says that seems to be rather stable since her heart attack in January.    Had been receiving injections from Dr. Ansari left knee and right shoulder.    Had been in the emergency room April 27 with exacerbation of right hip pain.  Was discharged home with Flexeril and Medrol Dosepak.  She evidently fell out of bed the following day and laid on the floor through the night.  Sustained some skin tears.  Would like to see if she can work with physical therapy to help with her mobilization.  Also interested in getting a life alert type device.  She has been doing some stretching at home and that has helped with the pain in the lower extremities.    Also reports that back in April she had a upper endoscopy and was found to have a candidal esophagitis which was treated with Diflucan.        Current Outpatient Medications   Medication Sig Dispense Refill   • albuterol sulfate  (90 Base) MCG/ACT inhaler Inhale 2 puffs Every 4 (Four) Hours As Needed for Wheezing or Shortness of Air. 18 g 0   • atorvastatin (LIPITOR) 80 MG tablet      • clopidogrel (PLAVIX) 75 MG tablet Take 1 tablet by mouth Daily.     • colestipol (COLESTID) 1 g  "tablet TAKE ONE TABLET BY MOUTH TWICE DAILY AS NEEDED 60 tablet 0   • DULoxetine (CYMBALTA) 60 MG capsule TAKE ONE CAPSULE BY MOUTH DAILY 90 capsule 1   • Eliquis 5 MG tablet tablet 1 tablet Every 12 (Twelve) Hours.     • fluconazole (DIFLUCAN) 100 MG tablet      • furosemide (LASIX) 40 MG tablet TAKE ONE TABLET BY MOUTH EVERY DAY 90 tablet 1   • gabapentin (NEURONTIN) 300 MG capsule TAKE ONE CAPSULE BY MOUTH TWICE DAILY 60 capsule 2   • Gauze Pads & Dressings (Telfa Non-Adherent) 3\"X4\" pads 1 pad Daily. 14 each 0   • glucose blood test strip Check BS once daily w/ onetouch ultra 2 or covered alternative DX E11.9 100 each 3   • guaiFENesin (MUCINEX) 600 MG 12 hr tablet Take 2 tablets by mouth.     • ipratropium-albuterol (DUO-NEB) 0.5-2.5 mg/3 ml nebulizer Take 3 mL by nebulization Every 4 (Four) Hours As Needed for Wheezing or Shortness of Air. 360 mL 1   • levothyroxine (SYNTHROID, LEVOTHROID) 25 MCG tablet TAKE ONE TABLET BY MOUTH EVERY MORNING 90 tablet 1   • lisinopril (PRINIVIL,ZESTRIL) 10 MG tablet TAKE ONE TABLET EVERY DAY 90 tablet 1   • Melatonin 10 MG tablet Take 1 tablet by mouth Every Night.     • methylPREDNISolone (MEDROL) 4 MG dose pack      • multivitamin with minerals tablet tablet Take 1 tablet by mouth Daily.     • O2 (OXYGEN) Inhale 1 (One) Time.     • omeprazole (priLOSEC) 40 MG capsule TAKE ONE CAPSULE BY MOUTH EVERY DAY 90 capsule 1   • oxybutynin XL (DITROPAN XL) 15 MG 24 hr tablet TAKE ONE TABLET BY MOUTH DAILY 90 tablet 1   • potassium chloride (K-DUR,KLOR-CON) 20 MEQ CR tablet TAKE ONE TABLET BY MOUTH TWICE DAILY 180 tablet 1   • Probiotic Product (PROBIOTIC DAILY PO) Take  by mouth.     • aspirin 81 MG chewable tablet 81 mg. (Patient not taking: Reported on 5/19/2023)       No current facility-administered medications for this visit.       Review of Systems         Objective   Vital Signs:   /58 (BP Location: Left arm, Patient Position: Sitting)   Pulse 94   Ht 165.2 cm (65.04\")  "  Wt 87.5 kg (193 lb)   SpO2 98% Comment: O2 mask, 2 liters.  BMI 32.08 kg/m²     Physical Exam   Sitting in a wheelchair  No acute distress  Color is good  She has had some weight loss  Thyroid nonenlarged nontender no mass  No cervical or supraclavicular adenopathy  Heart is regular rhythm at time of exam with 2/6 systolic murmur  Lungs with good air movement no wheeze or crackles  Abdomen soft and nontender  Lower extremities with 1+ pitting edema little worse on the right than left she is wearing JASON stockings          Result Review :                     Assessment and Plan    Diagnoses and all orders for this visit:    1. Type 2 diabetes mellitus without complication, without long-term current use of insulin (HCC) (Primary)  Assessment & Plan:  Sugars are generally well controlled.  We will check lab and predicate medication change based on result.      2. Acquired hypothyroidism  Assessment & Plan:  Check lab and predicate medication change based on result    Orders:  -     TSH Rfx On Abnormal To Free T4    3. Chronic combined systolic and diastolic heart failure (HCC)  Assessment & Plan:  Heart failure seems to be well compensated at the present.  Continue on her current regimen.  Follow-up with cardiology as recommended    Orders:  -     BNP    4. Paroxysmal atrial fibrillation (HCC)  Assessment & Plan:  In sinus rhythm at present.  Anticoagulated.  Continue on current regimen.  Follow-up with cardiology as recommended      5. Coronary artery disease involving native coronary artery of native heart without angina pectoris  Assessment & Plan:  Status post stent placement.  Continue with Plavix for at least 1 year.  Follow-up with cardiology as recommended.    Orders:  -     Comprehensive Metabolic Panel  -     Lipid Panel  -     CBC & Differential    6. Wheeze  -     ipratropium-albuterol (DUO-NEB) 0.5-2.5 mg/3 ml nebulizer; Take 3 mL by nebulization Every 4 (Four) Hours As Needed for Wheezing or Shortness  of Air.  Dispense: 360 mL; Refill: 1    7. Need for vaccination  -     COVID-19 (Pfizer) Bivalent 12+yrs      Follow Up   No follow-ups on file.  Patient was given instructions and counseling regarding her condition or for health maintenance advice. Please see specific information pulled into the AVS if appropriate.

## 2023-05-20 NOTE — ASSESSMENT & PLAN NOTE
Sugars are generally well controlled.  We will check lab and predicate medication change based on result.

## 2023-05-20 NOTE — ASSESSMENT & PLAN NOTE
Heart failure seems to be well compensated at the present.  Continue on her current regimen.  Follow-up with cardiology as recommended

## 2023-05-20 NOTE — ASSESSMENT & PLAN NOTE
In sinus rhythm at present.  Anticoagulated.  Continue on current regimen.  Follow-up with cardiology as recommended

## 2023-05-20 NOTE — ASSESSMENT & PLAN NOTE
Status post stent placement.  Continue with Plavix for at least 1 year.  Follow-up with cardiology as recommended.

## 2023-05-23 ENCOUNTER — LAB (OUTPATIENT)
Dept: LAB | Facility: HOSPITAL | Age: 83
End: 2023-05-23
Payer: MEDICARE

## 2023-05-23 LAB
BASOPHILS # BLD AUTO: 0.06 10*3/MM3 (ref 0–0.2)
BASOPHILS NFR BLD AUTO: 0.5 % (ref 0–1.5)
DEPRECATED RDW RBC AUTO: 47.1 FL (ref 37–54)
EOSINOPHIL # BLD AUTO: 0.21 10*3/MM3 (ref 0–0.4)
EOSINOPHIL NFR BLD AUTO: 1.7 % (ref 0.3–6.2)
ERYTHROCYTE [DISTWIDTH] IN BLOOD BY AUTOMATED COUNT: 15.3 % (ref 12.3–15.4)
HCT VFR BLD AUTO: 35.5 % (ref 34–46.6)
HGB BLD-MCNC: 11.6 G/DL (ref 12–15.9)
IMM GRANULOCYTES # BLD AUTO: 0.07 10*3/MM3 (ref 0–0.05)
IMM GRANULOCYTES NFR BLD AUTO: 0.6 % (ref 0–0.5)
LYMPHOCYTES # BLD AUTO: 2.04 10*3/MM3 (ref 0.7–3.1)
LYMPHOCYTES NFR BLD AUTO: 16.5 % (ref 19.6–45.3)
MCH RBC QN AUTO: 27.6 PG (ref 26.6–33)
MCHC RBC AUTO-ENTMCNC: 32.7 G/DL (ref 31.5–35.7)
MCV RBC AUTO: 84.3 FL (ref 79–97)
MONOCYTES # BLD AUTO: 0.87 10*3/MM3 (ref 0.1–0.9)
MONOCYTES NFR BLD AUTO: 7 % (ref 5–12)
NEUTROPHILS NFR BLD AUTO: 73.7 % (ref 42.7–76)
NEUTROPHILS NFR BLD AUTO: 9.13 10*3/MM3 (ref 1.7–7)
NRBC BLD AUTO-RTO: 0 /100 WBC (ref 0–0.2)
NT-PROBNP SERPL-MCNC: 1783 PG/ML (ref 0–1800)
PLATELET # BLD AUTO: 405 10*3/MM3 (ref 140–450)
PMV BLD AUTO: 8.6 FL (ref 6–12)
RBC # BLD AUTO: 4.21 10*6/MM3 (ref 3.77–5.28)
TSH SERPL DL<=0.05 MIU/L-ACNC: 0.83 UIU/ML (ref 0.27–4.2)
WBC NRBC COR # BLD: 12.38 10*3/MM3 (ref 3.4–10.8)

## 2023-05-23 PROCEDURE — 83880 ASSAY OF NATRIURETIC PEPTIDE: CPT | Performed by: FAMILY MEDICINE

## 2023-05-23 PROCEDURE — 84443 ASSAY THYROID STIM HORMONE: CPT | Performed by: FAMILY MEDICINE

## 2023-05-23 PROCEDURE — 85025 COMPLETE CBC W/AUTO DIFF WBC: CPT | Performed by: FAMILY MEDICINE

## 2023-05-30 DIAGNOSIS — R19.7 DIARRHEA, UNSPECIFIED TYPE: ICD-10-CM

## 2023-05-31 RX ORDER — MONTELUKAST SODIUM 4 MG/1
1 TABLET, CHEWABLE ORAL 2 TIMES DAILY
Qty: 60 TABLET | Refills: 2 | Status: SHIPPED | OUTPATIENT
Start: 2023-05-31

## 2023-06-02 DIAGNOSIS — I10 ESSENTIAL HYPERTENSION: ICD-10-CM

## 2023-06-02 DIAGNOSIS — E87.6 HYPOKALEMIA: ICD-10-CM

## 2023-06-02 DIAGNOSIS — M17.0 PRIMARY OSTEOARTHRITIS OF BOTH KNEES: ICD-10-CM

## 2023-06-02 RX ORDER — LEVOTHYROXINE SODIUM 0.03 MG/1
TABLET ORAL
Qty: 90 TABLET | Refills: 1 | Status: SHIPPED | OUTPATIENT
Start: 2023-06-02

## 2023-06-02 RX ORDER — OMEPRAZOLE 40 MG/1
CAPSULE, DELAYED RELEASE ORAL
Qty: 90 CAPSULE | Refills: 1 | OUTPATIENT
Start: 2023-06-02

## 2023-06-02 RX ORDER — POTASSIUM CHLORIDE 20 MEQ/1
TABLET, EXTENDED RELEASE ORAL
Qty: 180 TABLET | Refills: 1 | Status: SHIPPED | OUTPATIENT
Start: 2023-06-02

## 2023-06-02 RX ORDER — PANTOPRAZOLE SODIUM 40 MG/1
40 TABLET, DELAYED RELEASE ORAL DAILY
Qty: 90 TABLET | Refills: 1 | Status: SHIPPED | OUTPATIENT
Start: 2023-06-02

## 2023-06-02 RX ORDER — GABAPENTIN 300 MG/1
CAPSULE ORAL
Qty: 60 CAPSULE | Refills: 2 | Status: SHIPPED | OUTPATIENT
Start: 2023-06-02

## 2023-06-02 RX ORDER — METOPROLOL SUCCINATE 50 MG/1
TABLET, EXTENDED RELEASE ORAL
Qty: 90 TABLET | Refills: 1 | OUTPATIENT
Start: 2023-06-02

## 2023-06-05 ENCOUNTER — TELEPHONE (OUTPATIENT)
Dept: FAMILY MEDICINE CLINIC | Age: 83
End: 2023-06-05
Payer: MEDICARE

## 2023-06-05 NOTE — TELEPHONE ENCOUNTER
Jai aguiar/SHAWN called stating that he called to schedule PT for pt and she wants to wait for now.  She says she has a skin tear and he is asking if nursing can go in and evaluate it?  He says he will hold off on therapy until pt is ready.

## 2023-06-07 ENCOUNTER — TELEPHONE (OUTPATIENT)
Dept: FAMILY MEDICINE CLINIC | Age: 83
End: 2023-06-07
Payer: MEDICARE

## 2023-06-07 NOTE — TELEPHONE ENCOUNTER
Clary aguiar/Jamarcus called to clarify.  We sent in rx for Pantoprazole 40mg on 6/2 but pt has been on Omeprazole 40mg.  She wasn't sure if we are changing and I dont see anything about changing the med.  Please advise.

## 2023-06-08 ENCOUNTER — TELEPHONE (OUTPATIENT)
Dept: FAMILY MEDICINE CLINIC | Age: 83
End: 2023-06-08

## 2023-06-08 NOTE — TELEPHONE ENCOUNTER
Caller: SHARMAINEJOELLEN    Relationship: Emergency Contact    Best call back number: [phone]    What is the best time to reach you: ANYTIME    Who are you requesting to speak with (clinical staff, provider,  specific staff member): CLINICAL    What was the call regarding: THE PATIENT'S DAUGHTER STATED THEY WENT TO SEE DR. SHIELDS AT [facility]. THAT DOCTOR TOLD PATIENT EVERYTHING WAS NORMAL AND DIDN'T NEED TO SEE HER AGAIN. SHE WOULD LIKE A CALL BACK TO DISCUSS IF SEEING SPECIALIST IS NECESSARY. HER WHITE BLOOD CELL COUNT HAS BEEN CONSISTENTLY HIGH FOR A YEAR NOW. SHE WOULD LIKE TO KNOW IF PCP CAN SEE DR. SHIELDS'S NOTE.        Is it okay if the provider responds through MyChart: NO

## 2023-06-09 ENCOUNTER — TELEPHONE (OUTPATIENT)
Dept: FAMILY MEDICINE CLINIC | Age: 83
End: 2023-06-09
Payer: MEDICARE

## 2023-06-09 NOTE — TELEPHONE ENCOUNTER
Would be happy to see the patient needed.  All she has to do is make an appointment we can work her in.

## 2023-06-09 NOTE — TELEPHONE ENCOUNTER
Patient has cut herself with her finger nail on the rt lower leg. No pain or heat noted with the wound. Shala states it happen 2 days ago and now it is getting pretty red and is concern with infection and patient being a diabetic. Please advise and thank you

## 2023-06-13 NOTE — TELEPHONE ENCOUNTER
Okay.  That note is reviewed and it does seem that she has completed her work-up.  Would not need to follow-up again

## 2023-06-13 NOTE — TELEPHONE ENCOUNTER
I reviewed Dr. Sanchez's office notes from 2014.  At that time the elevated white blood cell count had resolved.  Since it has recurred and is persistent it would be best to return to him for repeat evaluation.    Best of Health,  Yohan Harmon MD

## 2023-07-25 ENCOUNTER — OFFICE VISIT (OUTPATIENT)
Dept: FAMILY MEDICINE CLINIC | Age: 83
End: 2023-07-25
Payer: MEDICARE

## 2023-07-25 VITALS
BODY MASS INDEX: 32.08 KG/M2 | DIASTOLIC BLOOD PRESSURE: 51 MMHG | HEART RATE: 93 BPM | HEIGHT: 65 IN | SYSTOLIC BLOOD PRESSURE: 109 MMHG | TEMPERATURE: 98.3 F | OXYGEN SATURATION: 97 %

## 2023-07-25 DIAGNOSIS — I25.10 CORONARY ARTERY DISEASE INVOLVING NATIVE CORONARY ARTERY OF NATIVE HEART WITHOUT ANGINA PECTORIS: ICD-10-CM

## 2023-07-25 DIAGNOSIS — E11.9 TYPE 2 DIABETES MELLITUS WITHOUT COMPLICATION, WITHOUT LONG-TERM CURRENT USE OF INSULIN: ICD-10-CM

## 2023-07-25 DIAGNOSIS — E03.9 ACQUIRED HYPOTHYROIDISM: ICD-10-CM

## 2023-07-25 DIAGNOSIS — I48.0 PAROXYSMAL ATRIAL FIBRILLATION: ICD-10-CM

## 2023-07-25 DIAGNOSIS — I50.42 CHRONIC COMBINED SYSTOLIC AND DIASTOLIC HEART FAILURE: Primary | ICD-10-CM

## 2023-07-25 PROCEDURE — 99496 TRANSJ CARE MGMT HIGH F2F 7D: CPT | Performed by: FAMILY MEDICINE

## 2023-07-25 PROCEDURE — 1111F DSCHRG MED/CURRENT MED MERGE: CPT | Performed by: FAMILY MEDICINE

## 2023-07-31 RX ORDER — NITROGLYCERIN 0.4 MG/1
0.4 TABLET SUBLINGUAL
Qty: 25 TABLET | Refills: 1 | Status: SHIPPED | OUTPATIENT
Start: 2023-07-31

## 2023-08-01 PROCEDURE — G0179 MD RECERTIFICATION HHA PT: HCPCS | Performed by: FAMILY MEDICINE

## 2023-08-02 ENCOUNTER — OUTSIDE FACILITY SERVICE (OUTPATIENT)
Dept: FAMILY MEDICINE CLINIC | Age: 83
End: 2023-08-02
Payer: MEDICARE

## 2023-08-08 RX ORDER — OXYBUTYNIN CHLORIDE 15 MG/1
TABLET, EXTENDED RELEASE ORAL
Qty: 90 TABLET | Refills: 1 | Status: SHIPPED | OUTPATIENT
Start: 2023-08-08

## 2023-08-08 RX ORDER — DULOXETIN HYDROCHLORIDE 60 MG/1
CAPSULE, DELAYED RELEASE ORAL
Qty: 90 CAPSULE | Refills: 1 | Status: SHIPPED | OUTPATIENT
Start: 2023-08-08

## 2023-08-08 RX ORDER — LISINOPRIL 10 MG/1
TABLET ORAL
Qty: 90 TABLET | Refills: 1 | Status: SHIPPED | OUTPATIENT
Start: 2023-08-08

## 2023-09-07 DIAGNOSIS — M17.0 PRIMARY OSTEOARTHRITIS OF BOTH KNEES: ICD-10-CM

## 2023-09-08 RX ORDER — GABAPENTIN 300 MG/1
CAPSULE ORAL
Qty: 60 CAPSULE | Refills: 2 | Status: SHIPPED | OUTPATIENT
Start: 2023-09-08

## 2023-09-15 DIAGNOSIS — M25.561 RIGHT KNEE PAIN, UNSPECIFIED CHRONICITY: Primary | ICD-10-CM

## 2023-09-15 DIAGNOSIS — M25.531 RIGHT WRIST PAIN: ICD-10-CM

## 2023-09-18 ENCOUNTER — HOSPITAL ENCOUNTER (OUTPATIENT)
Dept: GENERAL RADIOLOGY | Facility: HOSPITAL | Age: 83
Discharge: HOME | End: 2023-09-18
Admitting: PHYSICIAN ASSISTANT
Payer: MEDICARE

## 2023-09-18 ENCOUNTER — OFFICE VISIT (OUTPATIENT)
Dept: ORTHOPEDIC SURGERY | Facility: CLINIC | Age: 83
End: 2023-09-18
Payer: MEDICARE

## 2023-09-18 VITALS — HEIGHT: 65 IN | BODY MASS INDEX: 32.15 KG/M2 | WEIGHT: 193 LBS

## 2023-09-18 DIAGNOSIS — R20.2 NUMBNESS AND TINGLING IN RIGHT HAND: ICD-10-CM

## 2023-09-18 DIAGNOSIS — M17.12 PRIMARY OSTEOARTHRITIS OF LEFT KNEE: Primary | ICD-10-CM

## 2023-09-18 DIAGNOSIS — M25.531 RIGHT WRIST PAIN: ICD-10-CM

## 2023-09-18 DIAGNOSIS — R20.0 NUMBNESS AND TINGLING IN RIGHT HAND: ICD-10-CM

## 2023-09-18 DIAGNOSIS — M25.561 RIGHT KNEE PAIN, UNSPECIFIED CHRONICITY: ICD-10-CM

## 2023-09-18 PROCEDURE — 73110 X-RAY EXAM OF WRIST: CPT

## 2023-09-18 PROCEDURE — 73560 X-RAY EXAM OF KNEE 1 OR 2: CPT

## 2023-09-18 RX ADMIN — METHYLPREDNISOLONE ACETATE 160 MG: 80 INJECTION, SUSPENSION INTRA-ARTICULAR; INTRALESIONAL; INTRAMUSCULAR; SOFT TISSUE at 13:07

## 2023-09-18 RX ADMIN — LIDOCAINE HYDROCHLORIDE 2 ML: 10 INJECTION, SOLUTION EPIDURAL; INFILTRATION; INTRACAUDAL; PERINEURAL at 13:07

## 2023-09-18 RX ADMIN — LIDOCAINE HYDROCHLORIDE 0.5 ML: 10 INJECTION, SOLUTION EPIDURAL; INFILTRATION; INTRACAUDAL; PERINEURAL at 13:06

## 2023-09-18 RX ADMIN — METHYLPREDNISOLONE ACETATE 80 MG: 80 INJECTION, SUSPENSION INTRA-ARTICULAR; INTRALESIONAL; INTRAMUSCULAR; SOFT TISSUE at 13:06

## 2023-09-18 NOTE — PROGRESS NOTES
"Chief Complaint  Establish Care of the Right Wrist and Establish Care of the Left Knee    Subjective    History of Present Illness      Radha Arana is a 83 y.o. female who presents to [facility] for complaint of Knee Pain:   Patient complains of left knee pain.   The pain began 3 years ago.   The pain is located over medial aspect.    She describes the symptoms as burning.   Symptoms improve with injections.      She reports a history of knee pain over the last 3 years. She notes she has received steroid injections form Dr. Ansari in the past, which did help. She reports she has severe arthritis in her knee. She reports a burning sensation localized to the medial aspect of her knee. She reports she is able to ambulate with a rollator.     She also presents today for right wrist symptoms. She reports intermittent numbness, as well as pain in all of her fingers. She notes she underwent surgery on her left hand for carpal tunnel in the past. Writing seems to increase her symptoms.She reports the thumb is the worst of the numbness.    She reports a history of heart attack. The patient uses oxygen.       Objective   Vital Signs:   Ht 165.1 cm (65\")   Wt 87.5 kg (193 lb)   BMI 32.12 kg/m²       Physical Exam  Vitals signs and nursing note reviewed.   Constitutional:       Appearance: Normal appearance.   Pulmonary:      Effort: Pulmonary effort is normal.   Skin:     General: Skin is warm and dry.      Capillary Refill: Capillary refill takes less than 2 seconds.   Neurological:      General: No focal deficit present.      Mental Status: She is alert and oriented to person, place, and time. Mental status is at baseline.   Psychiatric:         Mood and Affect: Mood normal.         Behavior: Behavior normal.         Thought Content: Thought content normal.         Judgment: Judgment normal.     Ortho Exam   LEFT knee  There is moderate joint line tenderness at the medial aspect of the knee. "   Positive for varus orientation of the knee.   Positive for crepitus throughout range of motion.   Negative for effusion.  Positive patellar grind test.   Negative Lachman test.    Negative anterior and posterior drawer.  Range of motion in extension and flexion is: 0-110 degrees.  Neurovascular status is intact.    Dorsalis pedis and posterior tibial artery pulses are palpable.    Common peroneal nerve function is well preserved.  Gait is cautious and antalgic.      RIGHT wrist  She is right hand dominant.   Positive for mild swelling over the volar aspect of the wrist.   Positive Tinel's sign at the wrist.        Positive Phalen's sign at the wrist .   strength is impaired.   Radial pulse is palpable. Capillary refill is 2 seconds with a brisk return.   There is wasting of the thenar eminence.  Sensation to light touch and pinprick are diminished over the thumb, index and middle fingers.  Flexor and extensor tendon functions are well preserved.   Moving 2 point discrimination is prolonged over the median nerve distribution.  No evidence of RSD.       Result Review :   Radiologic studies - see below for interpretation  LEFT knee and RIGHT wrist xrays  weightbearing/standing three views were ordered by Kimani Woods PA-C. Performed at [facility] on 09/18/2023. Images were independently viewed and interpreted by myself, my impression as follows:  Findings: right wrist: No acute bony abnormality is noted, moderate degenerative changes throughout the wrist  left knee: advanced medial compartment arthrosis with bone on bone articulation and subchondral sclerosis, moderate lateral and patellofemoral degenerative changes  Bony lesion: yes  Soft tissues: within normal limits  Joint spaces: decreased  Hardware appropriately positioned: not applicable  Prior studies available for comparison: no          PROCEDURE  - Hand/Upper Extremity Injection: R carpal tunnel for carpal tunnel syndrome  on 9/18/2023 1:06 PM  Indications: pain  Details: 27 G needle, volar approach  Medications: 80 mg methylPREDNISolone acetate 80 MG/ML; 0.5 mL lidocaine PF 1% 1 %  Outcome: tolerated well, no immediate complications  Procedure, treatment alternatives, risks and benefits explained, specific risks discussed. Consent was given by the patient. Immediately prior to procedure a time out was called to verify the correct patient, procedure, equipment, support staff and site/side marked as required. Patient was prepped and draped in the usual sterile fashion.       - Large Joint Arthrocentesis: R knee on 9/18/2023 1:07 PM  Indications: pain  Details: 25 G needle, anteromedial approach  Medications: 160 mg methylPREDNISolone acetate 80 MG/ML; 2 mL lidocaine PF 1% 1 %  Outcome: tolerated well, no immediate complications  Procedure, treatment alternatives, risks and benefits explained, specific risks discussed. Consent was given by the patient. Immediately prior to procedure a time out was called to verify the correct patient, procedure, equipment, support staff and site/side marked as required. Patient was prepped and draped in the usual sterile fashion.                Assessment   Assessment and Plan    Diagnoses and all orders for this visit:    1. Primary osteoarthritis of left knee (Primary)  -     - Large Joint Arthrocentesis    2. Numbness and tingling in right hand  -     - Hand/Upper Extremity Injection             PLAN   Discussion of any imaging in detail. Discussion of orthopaedic goals.  Risk, benefits, and merits of treatment alternatives reviewed with the patient. Treatment alternatives include: intra-articular steroid injection and surgery  Ice, heat, and/or modalities as beneficial  Risks of minor surgical procedure/intra-articular injection, including but not limited to pain, bleeding, infection into the joint, pigment skin changes related to steroid administration, increased blood sugar levels secondary to  steroid administration, scar, recurrence of pain, and tendon rupture associated with steroid administration and possible need for further surgery reviewed with patient.  She accepts these risks and wishes to proceed with procedure.  Watch for signs and symptoms of infection  Call or notify for any adverse effect from injection therapy  Patient is encouraged to call or return for any issues or concerns.  Injected patient's right wrist joint and left knee joint with Depo-Medrol from a(n) volar and anterolateral approach, respectively.  Patient tolerated the procedure well and no complications were encountered.   Follow up in 3 months  Patient was given instructions and counseling regarding her condition or for health maintenance advice. Please see specific information pulled into the AVS if appropriate.     Kimani Woods PA-C   Date of Encounter: 9/18/2023     Transcribed from ambient dictation for Kimani Woods PA-C by Kimani Sebastian.  09/18/23   15:35 EDT    Patient or patient representative verbalized consent to the visit recording.  I have personally performed the services described in this document as transcribed by the above individual, and it is both accurate and complete.  Kimani Woods PA-C  9/20/2023  13:03 EDT

## 2023-09-20 PROBLEM — M17.12 PRIMARY OSTEOARTHRITIS OF LEFT KNEE: Status: ACTIVE | Noted: 2023-09-20

## 2023-09-20 PROBLEM — R20.0 NUMBNESS AND TINGLING IN RIGHT HAND: Status: ACTIVE | Noted: 2023-09-20

## 2023-09-20 PROBLEM — R20.2 NUMBNESS AND TINGLING IN RIGHT HAND: Status: ACTIVE | Noted: 2023-09-20

## 2023-09-20 RX ORDER — LIDOCAINE HYDROCHLORIDE 10 MG/ML
2 INJECTION, SOLUTION EPIDURAL; INFILTRATION; INTRACAUDAL; PERINEURAL
Status: COMPLETED | OUTPATIENT
Start: 2023-09-18 | End: 2023-09-18

## 2023-09-20 RX ORDER — LIDOCAINE HYDROCHLORIDE 10 MG/ML
0.5 INJECTION, SOLUTION EPIDURAL; INFILTRATION; INTRACAUDAL; PERINEURAL
Status: COMPLETED | OUTPATIENT
Start: 2023-09-18 | End: 2023-09-18

## 2023-09-20 RX ORDER — METHYLPREDNISOLONE ACETATE 80 MG/ML
80 INJECTION, SUSPENSION INTRA-ARTICULAR; INTRALESIONAL; INTRAMUSCULAR; SOFT TISSUE
Status: COMPLETED | OUTPATIENT
Start: 2023-09-18 | End: 2023-09-18

## 2023-09-20 RX ORDER — METHYLPREDNISOLONE ACETATE 80 MG/ML
160 INJECTION, SUSPENSION INTRA-ARTICULAR; INTRALESIONAL; INTRAMUSCULAR; SOFT TISSUE
Status: COMPLETED | OUTPATIENT
Start: 2023-09-18 | End: 2023-09-18

## 2023-09-22 ENCOUNTER — PATIENT ROUNDING (BHMG ONLY) (OUTPATIENT)
Dept: ORTHOPEDIC SURGERY | Facility: CLINIC | Age: 83
End: 2023-09-22
Payer: MEDICARE

## 2023-09-22 NOTE — PROGRESS NOTES
September 22, 2023      A [place]mate Message has been sent to the patient for PATIENT ROUNDING with [facility]

## 2023-10-03 DIAGNOSIS — R19.7 DIARRHEA, UNSPECIFIED TYPE: ICD-10-CM

## 2023-10-03 RX ORDER — MONTELUKAST SODIUM 4 MG/1
TABLET, CHEWABLE ORAL
Qty: 60 TABLET | Refills: 2 | Status: SHIPPED | OUTPATIENT
Start: 2023-10-03

## 2023-10-03 RX ORDER — FUROSEMIDE 40 MG/1
TABLET ORAL
Qty: 90 TABLET | Refills: 0 | Status: SHIPPED | OUTPATIENT
Start: 2023-10-03

## 2023-11-02 DIAGNOSIS — R19.7 DIARRHEA, UNSPECIFIED TYPE: ICD-10-CM

## 2023-11-02 RX ORDER — MONTELUKAST SODIUM 4 MG/1
1 TABLET, CHEWABLE ORAL 2 TIMES DAILY PRN
Qty: 60 TABLET | Refills: 0 | Status: SHIPPED | OUTPATIENT
Start: 2023-11-02

## 2023-11-29 DIAGNOSIS — M17.0 PRIMARY OSTEOARTHRITIS OF BOTH KNEES: ICD-10-CM

## 2023-11-29 DIAGNOSIS — E87.6 HYPOKALEMIA: ICD-10-CM

## 2023-11-29 RX ORDER — LEVOTHYROXINE SODIUM 0.03 MG/1
TABLET ORAL
Qty: 90 TABLET | Refills: 1 | Status: SHIPPED | OUTPATIENT
Start: 2023-11-29

## 2023-11-29 RX ORDER — GABAPENTIN 300 MG/1
CAPSULE ORAL
Qty: 60 CAPSULE | Refills: 0 | Status: SHIPPED | OUTPATIENT
Start: 2023-11-29 | End: 2023-12-01 | Stop reason: SDUPTHER

## 2023-11-29 RX ORDER — PANTOPRAZOLE SODIUM 40 MG/1
40 TABLET, DELAYED RELEASE ORAL DAILY
Qty: 90 TABLET | Refills: 1 | Status: SHIPPED | OUTPATIENT
Start: 2023-11-29

## 2023-11-29 RX ORDER — POTASSIUM CHLORIDE 20 MEQ/1
TABLET, EXTENDED RELEASE ORAL
Qty: 180 TABLET | Refills: 1 | Status: SHIPPED | OUTPATIENT
Start: 2023-11-29

## 2023-12-01 ENCOUNTER — LAB (OUTPATIENT)
Dept: LAB | Facility: HOSPITAL | Age: 83
End: 2023-12-01
Payer: MEDICARE

## 2023-12-01 ENCOUNTER — OFFICE VISIT (OUTPATIENT)
Dept: FAMILY MEDICINE CLINIC | Age: 83
End: 2023-12-01
Payer: MEDICARE

## 2023-12-01 VITALS
OXYGEN SATURATION: 98 % | SYSTOLIC BLOOD PRESSURE: 115 MMHG | HEART RATE: 99 BPM | WEIGHT: 183.4 LBS | BODY MASS INDEX: 30.56 KG/M2 | TEMPERATURE: 98.3 F | HEIGHT: 65 IN | DIASTOLIC BLOOD PRESSURE: 70 MMHG

## 2023-12-01 DIAGNOSIS — D64.9 ANEMIA, UNSPECIFIED TYPE: ICD-10-CM

## 2023-12-01 DIAGNOSIS — Z78.0 POSTMENOPAUSAL STATE: ICD-10-CM

## 2023-12-01 DIAGNOSIS — Z79.899 LONG-TERM USE OF HIGH-RISK MEDICATION: ICD-10-CM

## 2023-12-01 DIAGNOSIS — M85.80 OSTEOPENIA, UNSPECIFIED LOCATION: ICD-10-CM

## 2023-12-01 DIAGNOSIS — E11.9 DIABETES MELLITUS WITHOUT COMPLICATION: Primary | ICD-10-CM

## 2023-12-01 DIAGNOSIS — Z00.00 MEDICARE ANNUAL WELLNESS VISIT, SUBSEQUENT: Primary | ICD-10-CM

## 2023-12-01 DIAGNOSIS — E66.09 CLASS 1 OBESITY DUE TO EXCESS CALORIES WITH SERIOUS COMORBIDITY AND BODY MASS INDEX (BMI) OF 32.0 TO 32.9 IN ADULT: ICD-10-CM

## 2023-12-01 DIAGNOSIS — J44.9 CHRONIC OBSTRUCTIVE PULMONARY DISEASE, UNSPECIFIED COPD TYPE: ICD-10-CM

## 2023-12-01 DIAGNOSIS — I25.10 CORONARY ARTERY DISEASE INVOLVING NATIVE CORONARY ARTERY OF NATIVE HEART WITHOUT ANGINA PECTORIS: ICD-10-CM

## 2023-12-01 DIAGNOSIS — Z23 NEED FOR VACCINATION: ICD-10-CM

## 2023-12-01 DIAGNOSIS — M48.00 SPINAL STENOSIS, UNSPECIFIED SPINAL REGION: ICD-10-CM

## 2023-12-01 DIAGNOSIS — M17.0 PRIMARY OSTEOARTHRITIS OF BOTH KNEES: ICD-10-CM

## 2023-12-01 DIAGNOSIS — I48.0 PAROXYSMAL ATRIAL FIBRILLATION: ICD-10-CM

## 2023-12-01 DIAGNOSIS — E03.9 ACQUIRED HYPOTHYROIDISM: ICD-10-CM

## 2023-12-01 DIAGNOSIS — I50.42 CHRONIC COMBINED SYSTOLIC AND DIASTOLIC HEART FAILURE: ICD-10-CM

## 2023-12-01 DIAGNOSIS — E11.9 TYPE 2 DIABETES MELLITUS WITHOUT COMPLICATION, WITHOUT LONG-TERM CURRENT USE OF INSULIN: ICD-10-CM

## 2023-12-01 DIAGNOSIS — M15.0 PRIMARY GENERALIZED (OSTEO)ARTHRITIS: ICD-10-CM

## 2023-12-01 PROBLEM — E66.811 CLASS 1 OBESITY DUE TO EXCESS CALORIES WITH SERIOUS COMORBIDITY AND BODY MASS INDEX (BMI) OF 32.0 TO 32.9 IN ADULT: Status: ACTIVE | Noted: 2023-12-01

## 2023-12-01 LAB
ALBUMIN SERPL-MCNC: 4 G/DL (ref 3.5–5.2)
ALBUMIN/GLOB SERPL: 1.3 G/DL
ALP SERPL-CCNC: 82 U/L (ref 39–117)
ALT SERPL W P-5'-P-CCNC: 14 U/L (ref 1–33)
AMPHET+METHAMPHET UR QL: NEGATIVE
AMPHETAMINES UR QL: NEGATIVE
ANION GAP SERPL CALCULATED.3IONS-SCNC: 11.9 MMOL/L (ref 5–15)
AST SERPL-CCNC: 19 U/L (ref 1–32)
BARBITURATES UR QL SCN: NEGATIVE
BASOPHILS # BLD AUTO: 0.05 10*3/MM3 (ref 0–0.2)
BASOPHILS NFR BLD AUTO: 0.5 % (ref 0–1.5)
BENZODIAZ UR QL SCN: NEGATIVE
BILIRUB SERPL-MCNC: 0.4 MG/DL (ref 0–1.2)
BUN SERPL-MCNC: 16 MG/DL (ref 8–23)
BUN/CREAT SERPL: 18.8 (ref 7–25)
BUPRENORPHINE SERPL-MCNC: NEGATIVE NG/ML
CALCIUM SPEC-SCNC: 9 MG/DL (ref 8.6–10.5)
CANNABINOIDS SERPL QL: NEGATIVE
CHLORIDE SERPL-SCNC: 99 MMOL/L (ref 98–107)
CHOLEST SERPL-MCNC: 124 MG/DL (ref 0–200)
CO2 SERPL-SCNC: 26.1 MMOL/L (ref 22–29)
COCAINE UR QL: NEGATIVE
CREAT SERPL-MCNC: 0.85 MG/DL (ref 0.57–1)
DEPRECATED RDW RBC AUTO: 56.3 FL (ref 37–54)
EGFRCR SERPLBLD CKD-EPI 2021: 67.2 ML/MIN/1.73
EOSINOPHIL # BLD AUTO: 0.3 10*3/MM3 (ref 0–0.4)
EOSINOPHIL NFR BLD AUTO: 2.8 % (ref 0.3–6.2)
ERYTHROCYTE [DISTWIDTH] IN BLOOD BY AUTOMATED COUNT: 17.3 % (ref 12.3–15.4)
EXPIRATION DATE: NORMAL
GLOBULIN UR ELPH-MCNC: 3 GM/DL
GLUCOSE SERPL-MCNC: 98 MG/DL (ref 65–99)
HBA1C MFR BLD: 6.2 % (ref 4.8–5.6)
HCT VFR BLD AUTO: 37.4 % (ref 34–46.6)
HDLC SERPL-MCNC: 54 MG/DL (ref 40–60)
HGB BLD-MCNC: 11.1 G/DL (ref 12–15.9)
IMM GRANULOCYTES # BLD AUTO: 0.02 10*3/MM3 (ref 0–0.05)
IMM GRANULOCYTES NFR BLD AUTO: 0.2 % (ref 0–0.5)
LDLC SERPL CALC-MCNC: 50 MG/DL (ref 0–100)
LDLC/HDLC SERPL: 0.89 {RATIO}
LYMPHOCYTES # BLD AUTO: 1.38 10*3/MM3 (ref 0.7–3.1)
LYMPHOCYTES NFR BLD AUTO: 13 % (ref 19.6–45.3)
Lab: NORMAL
MCH RBC QN AUTO: 26.1 PG (ref 26.6–33)
MCHC RBC AUTO-ENTMCNC: 29.7 G/DL (ref 31.5–35.7)
MCV RBC AUTO: 88 FL (ref 79–97)
MDMA UR QL SCN: NEGATIVE
METHADONE UR QL SCN: NEGATIVE
MONOCYTES # BLD AUTO: 0.75 10*3/MM3 (ref 0.1–0.9)
MONOCYTES NFR BLD AUTO: 7 % (ref 5–12)
NEUTROPHILS NFR BLD AUTO: 76.5 % (ref 42.7–76)
NEUTROPHILS NFR BLD AUTO: 8.14 10*3/MM3 (ref 1.7–7)
OPIATES UR QL: NEGATIVE
OXYCODONE UR QL SCN: NEGATIVE
PCP UR QL SCN: NEGATIVE
PLATELET # BLD AUTO: 326 10*3/MM3 (ref 140–450)
PMV BLD AUTO: 8.4 FL (ref 6–12)
POTASSIUM SERPL-SCNC: 4.2 MMOL/L (ref 3.5–5.2)
PROT SERPL-MCNC: 7 G/DL (ref 6–8.5)
RBC # BLD AUTO: 4.25 10*6/MM3 (ref 3.77–5.28)
SODIUM SERPL-SCNC: 137 MMOL/L (ref 136–145)
TRIGL SERPL-MCNC: 110 MG/DL (ref 0–150)
TSH SERPL DL<=0.05 MIU/L-ACNC: 1.11 UIU/ML (ref 0.27–4.2)
VLDLC SERPL-MCNC: 20 MG/DL (ref 5–40)
WBC NRBC COR # BLD AUTO: 10.64 10*3/MM3 (ref 3.4–10.8)

## 2023-12-01 PROCEDURE — 85045 AUTOMATED RETICULOCYTE COUNT: CPT | Performed by: FAMILY MEDICINE

## 2023-12-01 PROCEDURE — 83036 HEMOGLOBIN GLYCOSYLATED A1C: CPT | Performed by: FAMILY MEDICINE

## 2023-12-01 PROCEDURE — 80061 LIPID PANEL: CPT | Performed by: FAMILY MEDICINE

## 2023-12-01 PROCEDURE — 84443 ASSAY THYROID STIM HORMONE: CPT | Performed by: FAMILY MEDICINE

## 2023-12-01 PROCEDURE — 36415 COLL VENOUS BLD VENIPUNCTURE: CPT | Performed by: FAMILY MEDICINE

## 2023-12-01 PROCEDURE — 85025 COMPLETE CBC W/AUTO DIFF WBC: CPT | Performed by: FAMILY MEDICINE

## 2023-12-01 PROCEDURE — 80053 COMPREHEN METABOLIC PANEL: CPT | Performed by: FAMILY MEDICINE

## 2023-12-01 RX ORDER — GABAPENTIN 300 MG/1
300 CAPSULE ORAL EVERY EVENING
Start: 2023-12-01

## 2023-12-01 NOTE — ASSESSMENT & PLAN NOTE
Not aware of palpitations.  Rate is controlled.  Anticoagulated.  She is over 78 years of age but renal function remains good continue at 5 mg.

## 2023-12-01 NOTE — ASSESSMENT & PLAN NOTE
Seems well compensated at the present.  She will follow-up with Kyree Joyner this afternoon.  Initially her blood pressure was measured low by the nurse but on my repeat looked okay.  Continue on the lisinopril 5 mg daily for now.  We will see what her blood pressure looks like it Kyree Joyner's office

## 2023-12-01 NOTE — PROGRESS NOTES
"The ABCs of the Annual Wellness Visit  Subsequent Medicare Wellness Visit    Subjective    Radha Arana is a 83 y.o. female who presents for a Subsequent Medicare Wellness Visit.    The following portions of the patient's history were reviewed and   updated as appropriate: allergies, current medications, past family history, past medical history, past social history, past surgical history, and problem list.    Compared to one year ago, the patient feels her physical   health is better.    Compared to one year ago, the patient feels her mental   health is the same.    Recent Hospitalizations:  She was admitted within the past 365 days at [facility].       Current Medical Providers:  Patient Care Team:  Rell Harmon MD as PCP - General (Family Medicine)  Lex Sanchez MD (Hematology and Oncology)    Outpatient Medications Prior to Visit   Medication Sig Dispense Refill    albuterol sulfate  (90 Base) MCG/ACT inhaler Inhale 2 puffs Every 4 (Four) Hours As Needed for Wheezing or Shortness of Air. 18 g 0    atorvastatin (LIPITOR) 80 MG tablet       clopidogrel (PLAVIX) 75 MG tablet Take 1 tablet by mouth Daily.      colestipol (COLESTID) 1 g tablet TAKE ONE TABLET BY MOUTH TWICE DAILY AS NEEDED 60 tablet 0    DULoxetine (CYMBALTA) 60 MG capsule TAKE ONE CAPSULE BY MOUTH DAILY 90 capsule 1    Eliquis 5 MG tablet tablet 1 tablet Every 12 (Twelve) Hours.      furosemide (LASIX) 40 MG tablet TAKE ONE TABLET BY MOUTH EVERY DAY 90 tablet 0    Gauze Pads & Dressings (Telfa Non-Adherent) 3\"X4\" pads 1 pad Daily. 14 each 0    glucose blood test strip Check BS once daily w/ onetouch ultra 2 or covered alternative DX E11.9 100 each 3    guaiFENesin (MUCINEX) 600 MG 12 hr tablet Take 2 tablets by mouth.      ipratropium-albuterol (DUO-NEB) 0.5-2.5 mg/3 ml nebulizer Take 3 mL by nebulization Every 4 (Four) Hours As Needed for Wheezing or Shortness of Air. 360 mL 1    levothyroxine (SYNTHROID, LEVOTHROID) " 25 MCG tablet TAKE ONE TABLET BY MOUTH EVERY MORNING 90 tablet 1    lisinopril (PRINIVIL,ZESTRIL) 10 MG tablet TAKE ONE TABLET EVERY DAY 90 tablet 1    Melatonin 10 MG tablet Take 1 tablet by mouth Every Night.      multivitamin with minerals tablet tablet Take 1 tablet by mouth Daily.      nitroglycerin (Nitrostat) 0.4 MG SL tablet Place 1 tablet under the tongue Every 5 (Five) Minutes As Needed for Chest Pain. Take no more than 3 doses in 15 minutes. 25 tablet 1    O2 (OXYGEN) Inhale 1 (One) Time.      oxybutynin XL (DITROPAN XL) 15 MG 24 hr tablet TAKE ONE TABLET BY MOUTH DAILY 90 tablet 1    pantoprazole (PROTONIX) 40 MG EC tablet TAKE ONE TABLET EVERY DAY 90 tablet 1    potassium chloride (K-DUR,KLOR-CON) 20 MEQ CR tablet TAKE ONE TABLET BY MOUTH TWICE DAILY 180 tablet 1    Probiotic Product (PROBIOTIC DAILY PO) Take  by mouth.      gabapentin (NEURONTIN) 300 MG capsule TAKE ONE CAPSULE BY MOUTH TWICE DAILY 60 capsule 0     No facility-administered medications prior to visit.       No opioid medication identified on active medication list. I have reviewed chart for other potential  high risk medication/s and harmful drug interactions in the elderly.        Aspirin is not on active medication list.  Aspirin use is not indicated based on review of current medical condition/s. Risk of harm outweighs potential benefits.  .    Patient Active Problem List   Diagnosis    Spinal stenosis    Chronic combined systolic and diastolic heart failure (HCC)    Paroxysmal atrial fibrillation (HCC)    Primary generalized (osteo)arthritis    Type 2 diabetes mellitus without complication, without long-term current use of insulin (HCC)    Acute pain of both shoulders    Diarrhea    Acquired hypothyroidism    Lymphocytic colitis    Medicare annual wellness visit, subsequent    Dizziness    Wheeze    Acute on chronic congestive heart failure    Coronary artery disease involving native coronary artery of native heart without angina  "pectoris    History of coronary angioplasty with insertion of stent    Numbness and tingling in right hand    Primary osteoarthritis of left knee    Class 1 obesity due to excess calories with serious comorbidity and body mass index (BMI) of 32.0 to 32.9 in adult    Osteopenia    COPD (chronic obstructive pulmonary disease)     Advance Care Planning   Advance Care Planning     Advance Directive is not on file.  ACP discussion was held with the patient during this visit. Patient has an advance directive (not in EMR), copy requested.     Objective    Vitals:    23 1046 23 1100   BP: 91/61 115/70  Comment: by stiles, left arm, lg cuff   BP Location: Left arm    Patient Position: Sitting    Pulse: 99    Temp: 98.3 °F (36.8 °C)    TempSrc: Temporal    SpO2: 98%    Weight: 83.2 kg (183 lb 6.4 oz)    Height: 165.1 cm (65\")      Estimated body mass index is 30.52 kg/m² as calculated from the following:    Height as of this encounter: 165.1 cm (65\").    Weight as of this encounter: 83.2 kg (183 lb 6.4 oz).    BMI is >= 30 and <35. (Class 1 Obesity). The following options were offered after discussion;: nutrition counseling/recommendations      Does the patient have evidence of cognitive impairment? No          HEALTH RISK ASSESSMENT    Smoking Status:  Social History     Tobacco Use   Smoking Status Never   Smokeless Tobacco Never     Alcohol Consumption:  Social History     Substance and Sexual Activity   Alcohol Use Not Currently     Fall Risk Screen:    STEADI Fall Risk Assessment was completed, and patient is at MODERATE risk for falls. Assessment completed on:2023    Depression Screenin/1/2023    10:44 AM   PHQ-2/PHQ-9 Depression Screening   Little Interest or Pleasure in Doing Things 0-->not at all   Feeling Down, Depressed or Hopeless 0-->not at all   PHQ-9: Brief Depression Severity Measure Score 0       Health Habits and Functional and Cognitive Screenin/1/2023    10:44 AM "   Functional & Cognitive Status   Do you have difficulty preparing food and eating? No   Do you have difficulty bathing yourself, getting dressed or grooming yourself? No   Do you have difficulty using the toilet? No   Do you have difficulty moving around from place to place? No   Do you have trouble with steps or getting out of a bed or a chair? No   Do you need help using the phone?  No   Are you deaf or do you have serious difficulty hearing?  No   Do you need help to go to places out of walking distance? No   Do you need help shopping? Yes   Do you need help preparing meals?  No   Do you need help with housework?  No   Do you need help with laundry? No   Do you need help taking your medications? No   Do you need help managing money? Yes   Do you ever drive or ride in a car without wearing a seat belt? No   Have you felt unusual stress, anger or loneliness in the last month? No   Who do you live with? Child   If you need help, do you have trouble finding someone available to you? No   Have you been bothered in the last four weeks by sexual problems? No   Do you have difficulty concentrating, remembering or making decisions? No       Age-appropriate Screening Schedule:  Refer to the list below for future screening recommendations based on patient's age, sex and/or medical conditions. Orders for these recommended tests are listed in the plan section. The patient has been provided with a written plan.    Health Maintenance   Topic Date Due    ZOSTER VACCINE (2 of 3) 06/12/2015    DXA SCAN  11/20/2021    URINE MICROALBUMIN  05/04/2023    COVID-19 Vaccine (7 - 2023-24 season) 09/01/2023    HEMOGLOBIN A1C  01/18/2024    LIPID PANEL  07/18/2024    DIABETIC EYE EXAM  11/15/2024    ANNUAL WELLNESS VISIT  12/01/2024    BMI FOLLOWUP  12/01/2024    TDAP/TD VACCINES (2 - Td or Tdap) 04/17/2025    INFLUENZA VACCINE  Completed    Pneumococcal Vaccine 65+  Completed                  CMS Preventative Services Quick  "Reference  Risk Factors Identified During Encounter  Fall Risk-High or Moderate: Discussed Fall Prevention in the home  The above risks/problems have been discussed with the patient.  Pertinent information has been shared with the patient in the After Visit Summary.  An After Visit Summary and PPPS were made available to the patient.    Follow Up:   Next Medicare Wellness visit to be scheduled in 1 year.   {Wrapup  Review (Popup)  Advance Care Planning  Labs  CC  Problem List  Visit Diagnosis  Medications  Result Review  Imaging  Health Maintenance  Quality  BestPractice  SmartSets  SnapShot  Encounters  Notes  Media  Procedures :23}    Additional E&M Note during same encounter follows:  Patient has multiple medical problems which are significant and separately identifiable that require additional work above and beyond the Medicare Wellness Visit.      Chief Complaint  Medicare Wellness-subsequent, Hypertension, and Hypothyroidism    Subjective    {Problem List  Visit Diagnosis   Encounters  Notes  Medications  Labs  Result Review Imaging  Media :23}    HPI  Radha Arana is also being seen today for ***         Objective   Vital Signs:  /70 Comment: by stiles, left arm, lg cuff  Pulse 99   Temp 98.3 °F (36.8 °C) (Temporal)   Ht 165.1 cm (65\")   Wt 83.2 kg (183 lb 6.4 oz)   SpO2 98%   BMI 30.52 kg/m²     Physical Exam     {The following data was reviewed by (Optional):88517}  {Ambulatory Labs (Optional):55674}  {Data reviewed (Optional):92896:::1}           Assessment and Plan {CC Problem List  Visit Diagnosis   ROS  Review (Popup)  [facility]  BestPractice  Medications  SmartSets  SnapShot Encounters  Media :23}  Diagnoses and all orders for this visit:    1. Medicare annual wellness visit, subsequent (Primary)  Comments:  We reviewed the preventive service recommendations and created an individualized handout    2. Type 2 diabetes mellitus without " complication, without long-term current use of insulin (HCC)  Assessment & Plan:  Check lab predicate medication change based on results    Orders:  -     Hemoglobin A1c  -     Lipid Panel  -     Comprehensive Metabolic Panel  -     CBC Auto Differential  -     Microalbumin / Creatinine Urine Ratio - Urine, Clean Catch    3. Acquired hypothyroidism  Assessment & Plan:  Check lab predicate changes based on result    Orders:  -     TSH Rfx On Abnormal To Free T4    4. Chronic combined systolic and diastolic heart failure (HCC)  Assessment & Plan:  Seems well compensated at the present.  She will follow-up with Kyree Joyner this afternoon.  Initially her blood pressure was measured low by the nurse but on my repeat looked okay.  Continue on the lisinopril 5 mg daily for now.  We will see what her blood pressure looks like it Kyree Joyner's office      5. Paroxysmal atrial fibrillation (HCC)  Assessment & Plan:  Not aware of palpitations.  Rate is controlled.  Anticoagulated.  She is over 78 years of age but renal function remains good continue at 5 mg.    Orders:  -     CBC Auto Differential  -     TSH Rfx On Abnormal To Free T4    6. Coronary artery disease involving native coronary artery of native heart without angina pectoris  Assessment & Plan:  She is approaching 1 year anniversary from her previous ischemic event    Orders:  -     Lipid Panel    7. Primary generalized (osteo)arthritis  Comments:  Getting back quite well at present with scheduled Tylenol    8. Spinal stenosis, unspecified spinal region  Assessment & Plan:  Since her symptoms are doing well at the present order to give her a trial of lowering the gabapentin to just 300 mg nightly.      9. Class 1 obesity due to excess calories with serious comorbidity and body mass index (BMI) of 32.0 to 32.9 in adult  Assessment & Plan:  Patient's (Body mass index is 30.52 kg/m².) indicates that they are obese (BMI >30) with health conditions that include  obstructive sleep apnea, hypertension, coronary heart disease, diabetes mellitus, and dyslipidemias . Weight is improving with lifestyle modifications. BMI  is above average; BMI management plan is completed. We discussed portion control.  She has lost weight recently commended her for the effort      10. Primary osteoarthritis of both knees  -     gabapentin (NEURONTIN) 300 MG capsule; Take 1 capsule by mouth Every Evening.    11. Osteopenia, unspecified location  -     DEXA Bone Density Axial; Future    12. Postmenopausal state  -     DEXA Bone Density Axial; Future    13. Need for vaccination  -     Cancel: COVID-19 F23 (Pfizer) 12yrs+ (COMIRNATY)    14. Long-term use of high-risk medication  -     POC Urine Drug Screen [facility]    15. Chronic obstructive pulmonary disease, unspecified COPD type  Assessment & Plan:  Continue with breathing treatments supplemental oxygen             {Time Spent (Optional):64938}  Follow Up {Instructions Charge Capture  Follow-up Communications :23}  No follow-ups on file.  Patient was given instructions and counseling regarding her condition or for health maintenance advice. Please see specific information pulled into the AVS if appropriate.

## 2023-12-01 NOTE — ASSESSMENT & PLAN NOTE
Patient's (Body mass index is 30.52 kg/m².) indicates that they are obese (BMI >30) with health conditions that include obstructive sleep apnea, hypertension, coronary heart disease, diabetes mellitus, and dyslipidemias . Weight is improving with lifestyle modifications. BMI  is above average; BMI management plan is completed. We discussed portion control.  She has lost weight recently commended her for the effort

## 2023-12-01 NOTE — PROGRESS NOTES
"The ABCs of the Annual Wellness Visit  Subsequent Medicare Wellness Visit    Subjective    Radha Arana is a 83 y.o. female who presents for a Subsequent Medicare Wellness Visit.    The following portions of the patient's history were reviewed and   updated as appropriate: allergies, current medications, past family history, past medical history, past social history, past surgical history, and problem list.    Compared to one year ago, the patient feels her physical   health is better.    Compared to one year ago, the patient feels her mental   health is the same.    Recent Hospitalizations:  She was admitted within the past 365 days at [facility].       Current Medical Providers:  Patient Care Team:  Rell Harmon MD as PCP - General (Family Medicine)  Lex Sanchez MD (Hematology and Oncology)    Outpatient Medications Prior to Visit   Medication Sig Dispense Refill    albuterol sulfate  (90 Base) MCG/ACT inhaler Inhale 2 puffs Every 4 (Four) Hours As Needed for Wheezing or Shortness of Air. 18 g 0    atorvastatin (LIPITOR) 80 MG tablet       clopidogrel (PLAVIX) 75 MG tablet Take 1 tablet by mouth Daily.      colestipol (COLESTID) 1 g tablet TAKE ONE TABLET BY MOUTH TWICE DAILY AS NEEDED 60 tablet 0    DULoxetine (CYMBALTA) 60 MG capsule TAKE ONE CAPSULE BY MOUTH DAILY 90 capsule 1    Eliquis 5 MG tablet tablet 1 tablet Every 12 (Twelve) Hours.      furosemide (LASIX) 40 MG tablet TAKE ONE TABLET BY MOUTH EVERY DAY 90 tablet 0    Gauze Pads & Dressings (Telfa Non-Adherent) 3\"X4\" pads 1 pad Daily. 14 each 0    glucose blood test strip Check BS once daily w/ onetouch ultra 2 or covered alternative DX E11.9 100 each 3    guaiFENesin (MUCINEX) 600 MG 12 hr tablet Take 2 tablets by mouth.      ipratropium-albuterol (DUO-NEB) 0.5-2.5 mg/3 ml nebulizer Take 3 mL by nebulization Every 4 (Four) Hours As Needed for Wheezing or Shortness of Air. 360 mL 1    levothyroxine (SYNTHROID, LEVOTHROID) " 25 MCG tablet TAKE ONE TABLET BY MOUTH EVERY MORNING 90 tablet 1    lisinopril (PRINIVIL,ZESTRIL) 10 MG tablet TAKE ONE TABLET EVERY DAY 90 tablet 1    Melatonin 10 MG tablet Take 1 tablet by mouth Every Night.      multivitamin with minerals tablet tablet Take 1 tablet by mouth Daily.      nitroglycerin (Nitrostat) 0.4 MG SL tablet Place 1 tablet under the tongue Every 5 (Five) Minutes As Needed for Chest Pain. Take no more than 3 doses in 15 minutes. 25 tablet 1    O2 (OXYGEN) Inhale 1 (One) Time.      oxybutynin XL (DITROPAN XL) 15 MG 24 hr tablet TAKE ONE TABLET BY MOUTH DAILY 90 tablet 1    pantoprazole (PROTONIX) 40 MG EC tablet TAKE ONE TABLET EVERY DAY 90 tablet 1    potassium chloride (K-DUR,KLOR-CON) 20 MEQ CR tablet TAKE ONE TABLET BY MOUTH TWICE DAILY 180 tablet 1    Probiotic Product (PROBIOTIC DAILY PO) Take  by mouth.      gabapentin (NEURONTIN) 300 MG capsule TAKE ONE CAPSULE BY MOUTH TWICE DAILY 60 capsule 0     No facility-administered medications prior to visit.       No opioid medication identified on active medication list. I have reviewed chart for other potential  high risk medication/s and harmful drug interactions in the elderly.        Aspirin is not on active medication list.  Aspirin use is not indicated based on review of current medical condition/s. Risk of harm outweighs potential benefits.  .    Patient Active Problem List   Diagnosis    Spinal stenosis    Chronic combined systolic and diastolic heart failure (HCC)    Paroxysmal atrial fibrillation (HCC)    Primary generalized (osteo)arthritis    Type 2 diabetes mellitus without complication, without long-term current use of insulin (HCC)    Acute pain of both shoulders    Diarrhea    Acquired hypothyroidism    Lymphocytic colitis    Medicare annual wellness visit, subsequent    Dizziness    Wheeze    Acute on chronic congestive heart failure    Coronary artery disease involving native coronary artery of native heart without angina  "pectoris    History of coronary angioplasty with insertion of stent    Numbness and tingling in right hand    Primary osteoarthritis of left knee    Class 1 obesity due to excess calories with serious comorbidity and body mass index (BMI) of 32.0 to 32.9 in adult    Osteopenia    COPD (chronic obstructive pulmonary disease)     Advance Care Planning   Advance Care Planning     Advance Directive is not on file.  ACP discussion was held with the patient during this visit. Patient has an advance directive (not in EMR), copy requested.     Objective    Vitals:    23 1046 23 1100   BP: 91/61 115/70  Comment: by stiles, left arm, lg cuff   BP Location: Left arm    Patient Position: Sitting    Pulse: 99    Temp: 98.3 °F (36.8 °C)    TempSrc: Temporal    SpO2: 98%    Weight: 83.2 kg (183 lb 6.4 oz)    Height: 165.1 cm (65\")      Estimated body mass index is 30.52 kg/m² as calculated from the following:    Height as of this encounter: 165.1 cm (65\").    Weight as of this encounter: 83.2 kg (183 lb 6.4 oz).    BMI is >= 30 and <35. (Class 1 Obesity). The following options were offered after discussion;: nutrition counseling/recommendations      Does the patient have evidence of cognitive impairment? No          HEALTH RISK ASSESSMENT    Smoking Status:  Social History     Tobacco Use   Smoking Status Never   Smokeless Tobacco Never     Alcohol Consumption:  Social History     Substance and Sexual Activity   Alcohol Use Not Currently     Fall Risk Screen:    STEADI Fall Risk Assessment was completed, and patient is at MODERATE risk for falls. Assessment completed on:2023    Depression Screenin/1/2023    10:44 AM   PHQ-2/PHQ-9 Depression Screening   Little Interest or Pleasure in Doing Things 0-->not at all   Feeling Down, Depressed or Hopeless 0-->not at all   PHQ-9: Brief Depression Severity Measure Score 0       Health Habits and Functional and Cognitive Screenin/1/2023    10:44 AM "   Functional & Cognitive Status   Do you have difficulty preparing food and eating? No   Do you have difficulty bathing yourself, getting dressed or grooming yourself? No   Do you have difficulty using the toilet? No   Do you have difficulty moving around from place to place? No   Do you have trouble with steps or getting out of a bed or a chair? No   Do you need help using the phone?  No   Are you deaf or do you have serious difficulty hearing?  No   Do you need help to go to places out of walking distance? No   Do you need help shopping? Yes   Do you need help preparing meals?  No   Do you need help with housework?  No   Do you need help with laundry? No   Do you need help taking your medications? No   Do you need help managing money? Yes   Do you ever drive or ride in a car without wearing a seat belt? No   Have you felt unusual stress, anger or loneliness in the last month? No   Who do you live with? Child   If you need help, do you have trouble finding someone available to you? No   Have you been bothered in the last four weeks by sexual problems? No   Do you have difficulty concentrating, remembering or making decisions? No       Age-appropriate Screening Schedule:  Refer to the list below for future screening recommendations based on patient's age, sex and/or medical conditions. Orders for these recommended tests are listed in the plan section. The patient has been provided with a written plan.    Health Maintenance   Topic Date Due    ZOSTER VACCINE (2 of 3) 06/12/2015    DXA SCAN  11/20/2021    URINE MICROALBUMIN  05/04/2023    COVID-19 Vaccine (7 - 2023-24 season) 09/01/2023    HEMOGLOBIN A1C  01/18/2024    LIPID PANEL  07/18/2024    DIABETIC EYE EXAM  11/15/2024    ANNUAL WELLNESS VISIT  12/01/2024    BMI FOLLOWUP  12/01/2024    TDAP/TD VACCINES (2 - Td or Tdap) 04/17/2025    INFLUENZA VACCINE  Completed    Pneumococcal Vaccine 65+  Completed                  CMS Preventative Services Quick  "Reference  Risk Factors Identified During Encounter  Fall Risk-High or Moderate: Discussed Fall Prevention in the home  The above risks/problems have been discussed with the patient.  Pertinent information has been shared with the patient in the After Visit Summary.  An After Visit Summary and PPPS were made available to the patient.    Follow Up:   Next Medicare Wellness visit to be scheduled in 1 year.       Additional E&M Note during same encounter follows:  Patient has multiple medical problems which are significant and separately identifiable that require additional work above and beyond the Medicare Wellness Visit.      Chief Complaint  Medicare Wellness-subsequent, Hypertension, and Hypothyroidism    Subjective        HPI  Radha Arana is also being seen today for other health issues as noted below    Accompanied by her daughter, Honey    Saw Kyree Ar August 11 that note is reviewed.  Her lisinopril was decreased to 5 mg daily.  Blood pressure still low today at 91 when checked by the medical assistant with automatic cuff.  Patient says that when she took her blood pressure at home it was 121/73 with a wrist monitor.  She has follow-up visit with Kyree Joyner later this afternoon.     Is being followed by orthopedics for her osteoarthritis receiving injections intermittently.    Was seen on November 17 by Dr. Gupta for follow-up on sleep apnea.  Note is reviewed.        Objective   Vital Signs:  /70 Comment: by mirian, left arm, lg cuff  Pulse 99   Temp 98.3 °F (36.8 °C) (Temporal)   Ht 165.1 cm (65\")   Wt 83.2 kg (183 lb 6.4 oz)   SpO2 98%   BMI 30.52 kg/m²     Physical Exam     No acute distress  Color is good  Get little red spot on the bridge of her nose from the CPAP mask  Tympanic membrane's clear  Oropharynx clear she is edentulous  No cervical or supraclavicular adenopathy  Heart irregular with controlled response  Lungs fairly good air movement no wheeze or crackles  Does have a " little skin tear on the right arm present for the past 10 days or so, no evidence of infection.  Lower extremities without edema  Neurologic without gross lateralizing focal deficits                         Assessment and Plan   Diagnoses and all orders for this visit:    1. Medicare annual wellness visit, subsequent (Primary)  Comments:  We reviewed the preventive service recommendations and created an individualized handout    2. Type 2 diabetes mellitus without complication, without long-term current use of insulin (HCC)  Assessment & Plan:  Check lab predicate medication change based on results    Orders:  -     Hemoglobin A1c  -     Lipid Panel  -     Comprehensive Metabolic Panel  -     CBC Auto Differential  -     Cancel: Microalbumin / Creatinine Urine Ratio - Urine, Clean Catch    3. Acquired hypothyroidism  Assessment & Plan:  Check lab predicate changes based on result    Orders:  -     TSH Rfx On Abnormal To Free T4    4. Chronic combined systolic and diastolic heart failure (HCC)  Assessment & Plan:  Seems well compensated at the present.  She will follow-up with Kyree Joyner this afternoon.  Initially her blood pressure was measured low by the nurse but on my repeat looked okay.  Continue on the lisinopril 5 mg daily for now.  We will see what her blood pressure looks like it Kyree Joyner's office      5. Paroxysmal atrial fibrillation (HCC)  Assessment & Plan:  Not aware of palpitations.  Rate is controlled.  Anticoagulated.  She is over 80 years of age but renal function remains good continue at 5 mg.    Orders:  -     CBC Auto Differential  -     TSH Rfx On Abnormal To Free T4    6. Coronary artery disease involving native coronary artery of native heart without angina pectoris  Assessment & Plan:  She is approaching 1 year anniversary from her previous ischemic event    Orders:  -     Lipid Panel    7. Primary generalized (osteo)arthritis  Comments:  Getting back quite well at present with  scheduled Tylenol  Assessment & Plan:  Doing quite well currently with scheduled Tylenol      8. Spinal stenosis, unspecified spinal region  Assessment & Plan:  Since her symptoms are doing well at the present order to give her a trial of lowering the gabapentin to just 300 mg nightly.    Orders:  -     gabapentin (NEURONTIN) 300 MG capsule; Take 1 capsule by mouth Every Evening.    9. Class 1 obesity due to excess calories with serious comorbidity and body mass index (BMI) of 32.0 to 32.9 in adult  Assessment & Plan:  Patient's (Body mass index is 30.52 kg/m².) indicates that they are obese (BMI >30) with health conditions that include obstructive sleep apnea, hypertension, coronary heart disease, diabetes mellitus, and dyslipidemias . Weight is improving with lifestyle modifications. BMI  is above average; BMI management plan is completed. We discussed portion control.  She has lost weight recently commended her for the effort      10. Primary osteoarthritis of both knees    11. Osteopenia, unspecified location  -     DEXA Bone Density Axial; Future    12. Postmenopausal state  -     DEXA Bone Density Axial; Future    13. Need for vaccination  -     Cancel: COVID-19 F23 (Pfizer) 12yrs+ (COMIRNATY)    14. Long-term use of high-risk medication  -     POC Urine Drug Screen [facility] [employer]    15. Chronic obstructive pulmonary disease, unspecified COPD type  Assessment & Plan:  Continue with breathing treatments supplemental oxygen               Follow Up   No follow-ups on file.  Patient was given instructions and counseling regarding her condition or for health maintenance advice. Please see specific information pulled into the AVS if appropriate.

## 2023-12-02 DIAGNOSIS — D64.9 ANEMIA, UNSPECIFIED TYPE: Primary | ICD-10-CM

## 2023-12-02 LAB
RETICS # AUTO: 0.05 10*6/MM3 (ref 0.02–0.13)
RETICS/RBC NFR AUTO: 1.43 % (ref 0.7–1.9)

## 2023-12-04 DIAGNOSIS — D50.9 IRON DEFICIENCY ANEMIA, UNSPECIFIED IRON DEFICIENCY ANEMIA TYPE: Primary | ICD-10-CM

## 2023-12-04 LAB
FERRITIN SERPL-MCNC: 23 NG/ML (ref 13–150)
IRON 24H UR-MRATE: 53 MCG/DL (ref 37–145)
IRON SATN MFR SERPL: 11 % (ref 20–50)
TIBC SERPL-MCNC: 480 MCG/DL (ref 298–536)
TRANSFERRIN SERPL-MCNC: 322 MG/DL (ref 200–360)

## 2023-12-20 ENCOUNTER — HOSPITAL ENCOUNTER (OUTPATIENT)
Dept: BONE DENSITY | Facility: HOSPITAL | Age: 83
Discharge: HOME | End: 2023-12-20
Admitting: FAMILY MEDICINE
Payer: MEDICARE

## 2023-12-20 ENCOUNTER — CLINICAL SUPPORT (OUTPATIENT)
Dept: ORTHOPEDIC SURGERY | Facility: CLINIC | Age: 83
End: 2023-12-20
Payer: MEDICARE

## 2023-12-20 VITALS — BODY MASS INDEX: 36.12 KG/M2 | WEIGHT: 179.2 LBS | HEIGHT: 59 IN | TEMPERATURE: 97.6 F

## 2023-12-20 DIAGNOSIS — R20.0 NUMBNESS AND TINGLING IN RIGHT HAND: ICD-10-CM

## 2023-12-20 DIAGNOSIS — M85.80 OSTEOPENIA, UNSPECIFIED LOCATION: ICD-10-CM

## 2023-12-20 DIAGNOSIS — Z78.0 POSTMENOPAUSAL STATE: ICD-10-CM

## 2023-12-20 DIAGNOSIS — M25.531 RIGHT WRIST PAIN: ICD-10-CM

## 2023-12-20 DIAGNOSIS — M17.12 PRIMARY OSTEOARTHRITIS OF LEFT KNEE: Primary | ICD-10-CM

## 2023-12-20 DIAGNOSIS — R20.2 NUMBNESS AND TINGLING IN RIGHT HAND: ICD-10-CM

## 2023-12-20 PROCEDURE — 77080 DXA BONE DENSITY AXIAL: CPT

## 2023-12-20 RX ORDER — METHYLPREDNISOLONE ACETATE 80 MG/ML
160 INJECTION, SUSPENSION INTRA-ARTICULAR; INTRALESIONAL; INTRAMUSCULAR; SOFT TISSUE
Status: COMPLETED | OUTPATIENT
Start: 2023-12-20 | End: 2023-12-20

## 2023-12-20 RX ORDER — LIDOCAINE HYDROCHLORIDE 10 MG/ML
2.5 INJECTION, SOLUTION EPIDURAL; INFILTRATION; INTRACAUDAL; PERINEURAL
Status: COMPLETED | OUTPATIENT
Start: 2023-12-20 | End: 2023-12-20

## 2023-12-20 RX ORDER — METHYLPREDNISOLONE ACETATE 80 MG/ML
80 INJECTION, SUSPENSION INTRA-ARTICULAR; INTRALESIONAL; INTRAMUSCULAR; SOFT TISSUE
Status: COMPLETED | OUTPATIENT
Start: 2023-12-20 | End: 2023-12-20

## 2023-12-20 RX ORDER — LIDOCAINE HYDROCHLORIDE 20 MG/ML
0.5 INJECTION, SOLUTION EPIDURAL; INFILTRATION; INTRACAUDAL; PERINEURAL
Status: COMPLETED | OUTPATIENT
Start: 2023-12-20 | End: 2023-12-20

## 2023-12-20 RX ADMIN — LIDOCAINE HYDROCHLORIDE 0.5 ML: 20 INJECTION, SOLUTION EPIDURAL; INFILTRATION; INTRACAUDAL; PERINEURAL at 10:28

## 2023-12-20 RX ADMIN — METHYLPREDNISOLONE ACETATE 80 MG: 80 INJECTION, SUSPENSION INTRA-ARTICULAR; INTRALESIONAL; INTRAMUSCULAR; SOFT TISSUE at 10:28

## 2023-12-20 RX ADMIN — METHYLPREDNISOLONE ACETATE 160 MG: 80 INJECTION, SUSPENSION INTRA-ARTICULAR; INTRALESIONAL; INTRAMUSCULAR; SOFT TISSUE at 10:22

## 2023-12-20 RX ADMIN — LIDOCAINE HYDROCHLORIDE 2.5 ML: 10 INJECTION, SOLUTION EPIDURAL; INFILTRATION; INTRACAUDAL; PERINEURAL at 10:22

## 2023-12-20 NOTE — PROGRESS NOTES
"Chief Complaint  Follow-up of the Right Wrist (INJECTION) and Follow-up of the Left Knee (INJECTION)    Subjective    History of Present Illness      Radha Arana is a 83 y.o. female who presents to [facility] for injection therapy. She receives  LEFT knee and  RIGHT wrist injections of Depo-Medrol. Since last injection, patient notes great relief of symptoms. She states about 2-3 weeks ago she started to notice some numbness and tingling starting back in the wrist. She was previously receiving injections from Dr. Ansari. Treatment options have been discussed and she understands and consents.       Objective   Vital Signs:   Temp 97.6 °F (36.4 °C)   Ht 149.9 cm (59\")   Wt 81.3 kg (179 lb 3.2 oz)   BMI 36.19 kg/m²     Physical Exam  83 y.o. female is awake, alert, oriented, in no acute distress and well developed, well nourished.  Ortho Exam   LEFT knee  There is moderate joint line tenderness at the medial aspect of the knee.   Positive for varus orientation of the knee.   Positive for crepitus throughout range of motion.   Negative for effusion.  Positive patellar grind test.   Negative Lachman test.    Negative anterior and posterior drawer.  Range of motion in extension and flexion is: 0-110 degrees.  Neurovascular status is intact.    Dorsalis pedis and posterior tibial artery pulses are palpable.    Common peroneal nerve function is well preserved.  Gait is cautious and antalgic.        RIGHT wrist  She is right hand dominant.   Positive for mild swelling over the volar aspect of the wrist.   Positive Tinel's sign at the wrist.        Positive Phalen's sign at the wrist .   strength is impaired.   Radial pulse is palpable. Capillary refill is 2 seconds with a brisk return.   There is wasting of the thenar eminence.  Sensation to light touch and pinprick are diminished over the thumb, index and middle fingers.  Flexor and extensor tendon functions are well preserved.   Moving " 2 point discrimination is prolonged over the median nerve distribution.  No evidence of RSD.           Result Review :        PROCEDURE  Large Joint Arthrocentesis: L knee  Date/Time: 12/20/2023 10:22 AM  Consent given by: patient  Site marked: site marked  Timeout: Immediately prior to procedure a time out was called to verify the correct patient, procedure, equipment, support staff and site/side marked as required   Supporting Documentation  Indications: pain   Procedure Details  Location: knee - L knee  Preparation: Patient was prepped and draped in the usual sterile fashion  Needle size: 25 G  Medications administered: 2.5 mL lidocaine PF 1% 1 %; 160 mg methylPREDNISolone acetate 80 MG/ML      - Hand/Upper Extremity Injection: R carpal tunnel for carpal tunnel syndrome on 12/20/2023 10:28 AM  Indications: pain  Details: 27 G needle, volar approach  Medications: 80 mg methylPREDNISolone acetate 80 MG/ML; 0.5 mL lidocaine PF 2% 2 %  Outcome: tolerated well, no immediate complications  Procedure, treatment alternatives, risks and benefits explained, specific risks discussed. Consent was given by the patient. Immediately prior to procedure a time out was called to verify the correct patient, procedure, equipment, support staff and site/side marked as required. Patient was prepped and draped in the usual sterile fashion.              Injection site was identified by physical examination and cleaned with Betadine and alcohol swabs. Prior to needle insertion, ethyl chloride spray was used for surface anesthesia. Sterile technique was used.       Assessment   Assessment and Plan    Problem List Items Addressed This Visit          Musculoskeletal and Injuries    Primary osteoarthritis of left knee - Primary    Relevant Orders    Large Joint Arthrocentesis: L knee    Right wrist pain    Relevant Orders    - Hand/Upper Extremity Injection: R carpal tunnel       Symptoms and Signs    Numbness and tingling in right hand     Relevant Orders    - Hand/Upper Extremity Injection: R carpal tunnel       Follow Up   Right wrist Depo-Medrol injection was discussed with the patient. Discussed indication, risks, benefits, and alternatives. Verbal consent was given to proceed with the procedure. Left knee depomedrol injection was also discussed with the patient.  Knee injection was performed from anterolateral approach. Wrist injection performed from volar approach. Patient tolerated the procedure well and no complications were encountered.  Discussion of orthopedic goals and activities and patient/guardian expressed understanding.  Ice, heat, rest, compression and elevation of extremity as beneficial  nsaids and/or tylenol as beneficial  Instructed to refrain from heavy activity/rest the extremity for the next 24-48 hours  Discussion regarding possibility of cortisol flare and what to expect if this occurs  Watch for signs and symptoms of infection  Call if adverse effect from injection therapy  Follow up in 3 months  Patient was given instructions and counseling regarding her condition or for health maintenance advice. Please see specific information pulled into the AVS if appropriate.     Kimani Woods PA-C   Date of Encounter: 12/20/2023     Electronically signed by Kimani Woods PA-C, 12/20/23, 10:30 AM EST.   EMR Dragon/Transcription disclaimer:  Much of this encounter note is an electronic transcription/translation of spoken language to printed text. The electronic translation of spoken language may permit erroneous, or at times, nonsensical words or phrases to be inadvertently transcribed; Although I have reviewed the note for such errors, some may still exist.

## 2024-01-04 DIAGNOSIS — M48.00 SPINAL STENOSIS, UNSPECIFIED SPINAL REGION: ICD-10-CM

## 2024-01-05 RX ORDER — FUROSEMIDE 40 MG/1
TABLET ORAL
Qty: 90 TABLET | Refills: 0 | Status: SHIPPED | OUTPATIENT
Start: 2024-01-05

## 2024-01-05 RX ORDER — GABAPENTIN 300 MG/1
300 CAPSULE ORAL 2 TIMES DAILY
Qty: 60 CAPSULE | Refills: 2 | Status: SHIPPED | OUTPATIENT
Start: 2024-01-05

## 2024-01-22 ENCOUNTER — LAB (OUTPATIENT)
Dept: LAB | Facility: HOSPITAL | Age: 84
End: 2024-01-22
Payer: MEDICARE

## 2024-01-22 ENCOUNTER — OFFICE VISIT (OUTPATIENT)
Dept: FAMILY MEDICINE CLINIC | Age: 84
End: 2024-01-22
Payer: MEDICARE

## 2024-01-22 VITALS
OXYGEN SATURATION: 95 % | HEART RATE: 79 BPM | HEIGHT: 59 IN | TEMPERATURE: 98.5 F | BODY MASS INDEX: 36.49 KG/M2 | WEIGHT: 181 LBS | SYSTOLIC BLOOD PRESSURE: 115 MMHG | DIASTOLIC BLOOD PRESSURE: 53 MMHG

## 2024-01-22 DIAGNOSIS — M48.00 SPINAL STENOSIS, UNSPECIFIED SPINAL REGION: ICD-10-CM

## 2024-01-22 DIAGNOSIS — Z23 NEED FOR VACCINATION: ICD-10-CM

## 2024-01-22 DIAGNOSIS — M81.0 AGE-RELATED OSTEOPOROSIS WITHOUT CURRENT PATHOLOGICAL FRACTURE: ICD-10-CM

## 2024-01-22 DIAGNOSIS — D64.9 ANEMIA, UNSPECIFIED TYPE: ICD-10-CM

## 2024-01-22 DIAGNOSIS — E11.9 TYPE 2 DIABETES MELLITUS WITHOUT COMPLICATION, WITHOUT LONG-TERM CURRENT USE OF INSULIN: Primary | ICD-10-CM

## 2024-01-22 DIAGNOSIS — R19.7 DIARRHEA, UNSPECIFIED TYPE: ICD-10-CM

## 2024-01-22 DIAGNOSIS — D50.9 IRON DEFICIENCY ANEMIA, UNSPECIFIED IRON DEFICIENCY ANEMIA TYPE: ICD-10-CM

## 2024-01-22 DIAGNOSIS — E11.9 DIABETES MELLITUS WITHOUT COMPLICATION: ICD-10-CM

## 2024-01-22 LAB
ANION GAP SERPL CALCULATED.3IONS-SCNC: 12 MMOL/L (ref 5–15)
BASOPHILS # BLD AUTO: 0.04 10*3/MM3 (ref 0–0.2)
BASOPHILS NFR BLD AUTO: 0.5 % (ref 0–1.5)
BUN SERPL-MCNC: 14 MG/DL (ref 8–23)
BUN/CREAT SERPL: 17.3 (ref 7–25)
CALCIUM SPEC-SCNC: 9.2 MG/DL (ref 8.6–10.5)
CHLORIDE SERPL-SCNC: 98 MMOL/L (ref 98–107)
CO2 SERPL-SCNC: 27 MMOL/L (ref 22–29)
CREAT SERPL-MCNC: 0.81 MG/DL (ref 0.57–1)
DEPRECATED RDW RBC AUTO: 57.2 FL (ref 37–54)
EGFRCR SERPLBLD CKD-EPI 2021: 70.8 ML/MIN/1.73
EOSINOPHIL # BLD AUTO: 0.31 10*3/MM3 (ref 0–0.4)
EOSINOPHIL NFR BLD AUTO: 3.5 % (ref 0.3–6.2)
ERYTHROCYTE [DISTWIDTH] IN BLOOD BY AUTOMATED COUNT: 16.8 % (ref 12.3–15.4)
FERRITIN SERPL-MCNC: 35.1 NG/ML (ref 13–150)
GLUCOSE SERPL-MCNC: 96 MG/DL (ref 65–99)
HCT VFR BLD AUTO: 40.2 % (ref 34–46.6)
HGB BLD-MCNC: 12.1 G/DL (ref 12–15.9)
IMM GRANULOCYTES # BLD AUTO: 0.01 10*3/MM3 (ref 0–0.05)
IMM GRANULOCYTES NFR BLD AUTO: 0.1 % (ref 0–0.5)
IRON 24H UR-MRATE: 183 MCG/DL (ref 37–145)
IRON SATN MFR SERPL: 42 % (ref 20–50)
LYMPHOCYTES # BLD AUTO: 1.54 10*3/MM3 (ref 0.7–3.1)
LYMPHOCYTES NFR BLD AUTO: 17.4 % (ref 19.6–45.3)
MCH RBC QN AUTO: 27.5 PG (ref 26.6–33)
MCHC RBC AUTO-ENTMCNC: 30.1 G/DL (ref 31.5–35.7)
MCV RBC AUTO: 91.4 FL (ref 79–97)
MONOCYTES # BLD AUTO: 0.72 10*3/MM3 (ref 0.1–0.9)
MONOCYTES NFR BLD AUTO: 8.1 % (ref 5–12)
NEUTROPHILS NFR BLD AUTO: 6.24 10*3/MM3 (ref 1.7–7)
NEUTROPHILS NFR BLD AUTO: 70.4 % (ref 42.7–76)
PLATELET # BLD AUTO: 333 10*3/MM3 (ref 140–450)
PMV BLD AUTO: 8.4 FL (ref 6–12)
POTASSIUM SERPL-SCNC: 4.5 MMOL/L (ref 3.5–5.2)
RBC # BLD AUTO: 4.4 10*6/MM3 (ref 3.77–5.28)
RETICS # AUTO: 0.08 10*6/MM3 (ref 0.02–0.13)
RETICS/RBC NFR AUTO: 1.82 % (ref 0.7–1.9)
SODIUM SERPL-SCNC: 137 MMOL/L (ref 136–145)
TIBC SERPL-MCNC: 438 MCG/DL (ref 298–536)
TRANSFERRIN SERPL-MCNC: 294 MG/DL (ref 200–360)
WBC NRBC COR # BLD AUTO: 8.86 10*3/MM3 (ref 3.4–10.8)

## 2024-01-22 PROCEDURE — 85045 AUTOMATED RETICULOCYTE COUNT: CPT

## 2024-01-22 PROCEDURE — 86364 TISS TRNSGLTMNASE EA IG CLAS: CPT

## 2024-01-22 PROCEDURE — 99214 OFFICE O/P EST MOD 30 MIN: CPT | Performed by: FAMILY MEDICINE

## 2024-01-22 PROCEDURE — 90480 ADMN SARSCOV2 VAC 1/ONLY CMP: CPT | Performed by: FAMILY MEDICINE

## 2024-01-22 PROCEDURE — G2211 COMPLEX E/M VISIT ADD ON: HCPCS | Performed by: FAMILY MEDICINE

## 2024-01-22 PROCEDURE — 86258 DGP ANTIBODY EACH IG CLASS: CPT

## 2024-01-22 PROCEDURE — 82728 ASSAY OF FERRITIN: CPT

## 2024-01-22 PROCEDURE — 84466 ASSAY OF TRANSFERRIN: CPT

## 2024-01-22 PROCEDURE — 91320 SARSCV2 VAC 30MCG TRS-SUC IM: CPT | Performed by: FAMILY MEDICINE

## 2024-01-22 PROCEDURE — 85025 COMPLETE CBC W/AUTO DIFF WBC: CPT

## 2024-01-22 PROCEDURE — 83540 ASSAY OF IRON: CPT

## 2024-01-22 PROCEDURE — 36415 COLL VENOUS BLD VENIPUNCTURE: CPT

## 2024-01-22 PROCEDURE — 80048 BASIC METABOLIC PNL TOTAL CA: CPT

## 2024-01-22 PROCEDURE — 82784 ASSAY IGA/IGD/IGG/IGM EACH: CPT

## 2024-01-22 RX ORDER — ALENDRONATE SODIUM 70 MG/1
TABLET ORAL
Qty: 12 TABLET | Refills: 3 | Status: SHIPPED | OUTPATIENT
Start: 2024-01-22

## 2024-01-22 RX ORDER — GABAPENTIN 300 MG/1
300 CAPSULE ORAL
Qty: 30 CAPSULE | Refills: 2 | Status: SHIPPED | OUTPATIENT
Start: 2024-01-22

## 2024-01-22 NOTE — PROGRESS NOTES
"Chief Complaint  discuss test results (Bone density scan)    Subjective          Radha Arana presents to [facility]  History of Present Illness    Accompanied today by her daughter, Honey  Patient had her 84 birthday yesterday  Says her blood sugars this morning were running about 128 which is a little higher than usual probably from cake and ice cream    Says that her breathing is doing about like usual.  She is on continuous oxygen running at 3 L but she has a prolonged oxygen tubing.    Blood pressure looks okay today.  She says occasionally does still run in the 90s but only rarely so.    She is due for some follow-up lab work.  Her daughter Honey brought up a good question about possibly checking her for celiac disease since she has had the diarrhea and anemia.  Certainly is reasonable consideration.        Current Outpatient Medications on File Prior to Visit   Medication Sig Dispense Refill    albuterol sulfate  (90 Base) MCG/ACT inhaler Inhale 2 puffs Every 4 (Four) Hours As Needed for Wheezing or Shortness of Air. 18 g 0    atorvastatin (LIPITOR) 80 MG tablet       clopidogrel (PLAVIX) 75 MG tablet Take 1 tablet by mouth Daily.      colestipol (COLESTID) 1 g tablet TAKE ONE TABLET BY MOUTH TWICE DAILY AS NEEDED 60 tablet 0    DULoxetine (CYMBALTA) 60 MG capsule TAKE ONE CAPSULE BY MOUTH DAILY 90 capsule 1    Eliquis 5 MG tablet tablet 1 tablet Every 12 (Twelve) Hours.      furosemide (LASIX) 40 MG tablet TAKE ONE TABLET BY MOUTH EVERY DAY 90 tablet 0    Gauze Pads & Dressings (Telfa Non-Adherent) 3\"X4\" pads 1 pad Daily. 14 each 0    glucose blood test strip Check BS once daily w/ onetouch ultra 2 or covered alternative DX E11.9 100 each 3    guaiFENesin (MUCINEX) 600 MG 12 hr tablet Take 2 tablets by mouth.      ipratropium-albuterol (DUO-NEB) 0.5-2.5 mg/3 ml nebulizer Take 3 mL by nebulization Every 4 (Four) Hours As Needed for Wheezing or Shortness of Air. 360 mL " "1    Iron, Ferrous Sulfate, 325 (65 Fe) MG tablet Take 325 mg by mouth Every Morning Before Breakfast. Take along with OJ or Vit C tablet. 100 tablet 0    levothyroxine (SYNTHROID, LEVOTHROID) 25 MCG tablet TAKE ONE TABLET BY MOUTH EVERY MORNING 90 tablet 1    lisinopril (PRINIVIL,ZESTRIL) 10 MG tablet TAKE ONE TABLET EVERY DAY 90 tablet 1    Melatonin 10 MG tablet Take 1 tablet by mouth Every Night.      multivitamin with minerals tablet tablet Take 1 tablet by mouth Daily.      nitroglycerin (Nitrostat) 0.4 MG SL tablet Place 1 tablet under the tongue Every 5 (Five) Minutes As Needed for Chest Pain. Take no more than 3 doses in 15 minutes. 25 tablet 1    O2 (OXYGEN) Inhale 1 (One) Time.      oxybutynin XL (DITROPAN XL) 15 MG 24 hr tablet TAKE ONE TABLET BY MOUTH DAILY 90 tablet 1    pantoprazole (PROTONIX) 40 MG EC tablet TAKE ONE TABLET EVERY DAY 90 tablet 1    potassium chloride (K-DUR,KLOR-CON) 20 MEQ CR tablet TAKE ONE TABLET BY MOUTH TWICE DAILY 180 tablet 1    Probiotic Product (PROBIOTIC DAILY PO) Take  by mouth.      [DISCONTINUED] gabapentin (NEURONTIN) 300 MG capsule TAKE ONE CAPSULE BY MOUTH TWICE DAILY 60 capsule 2     No current facility-administered medications on file prior to visit.       Review of Systems         Objective   Vital Signs:   /53 (BP Location: Left arm, Patient Position: Sitting)   Pulse 79   Temp 98.5 °F (36.9 °C) (Oral)   Ht 149.9 cm (59\")   Wt 82.1 kg (181 lb) Comment: estimated  SpO2 95% Comment: 2LPM  BMI 36.56 kg/m²     Physical Exam     No acute distress  Good spirits  Color is good  Heart is regular rhythm today with 2/6 systolic murmur  Lungs with good air movement no wheeze or crackles  Lower extremities without edema      Result Review :                     Assessment and Plan    Diagnoses and all orders for this visit:    1. Type 2 diabetes mellitus without complication, without long-term current use of insulin (Primary)  Assessment & Plan:  Does well with her " blood sugars.  She certainly deserves cake and ice cream for an 84 birthday      2. Anemia, unspecified type  Assessment & Plan:  Actually been thought to check for celiac disease will obtain the labs.  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 Diarrhea, unspecified type  -     Celiac Disease Antibody Screen; Future    4. Age-related osteoporosis without current pathological fracture  -     alendronate (Fosamax) 70 MG tablet; Take on empty stomach with glass of water. Wait 30 min before any other intake and stay upright.  Dispense: 12 tablet; Refill: 3    5. Need for vaccination  -     COVID-19 F23 (Pfizer) 12yrs+ (COMIRNATY)    6. Spinal stenosis, unspecified spinal region  Assessment & Plan:  At her visit in December wanted to change her gabapentin to once a day but somehow she got her med pack refilled it twice a day.  I suspect it may have to do with automatic refill request.  Will not give it another try upping to once a day at bedtime    Orders:  -     gabapentin (NEURONTIN) 300 MG capsule; Take 1 capsule by mouth every night at bedtime.  Dispense: 30 capsule; Refill: 2        Follow Up   No follow-ups on file.  Patient was given instructions and counseling regarding her condition or for health maintenance advice. Please see specific information pulled into the AVS if appropriate.

## 2024-01-22 NOTE — ASSESSMENT & PLAN NOTE
At her visit in December wanted to change her gabapentin to once a day but somehow she got her med pack refilled it twice a day.  I suspect it may have to do with automatic refill request.  Will not give it another try upping to once a day at bedtime

## 2024-01-22 NOTE — ASSESSMENT & PLAN NOTE
Actually been thought to check for celiac disease will obtain the labs.  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

## 2024-01-24 LAB
GLIADIN PEPTIDE IGA SER-ACNC: 4 UNITS (ref 0–19)
IGA SERPL-MCNC: 119 MG/DL (ref 64–422)
TTG IGA SER-ACNC: <2 U/ML (ref 0–3)

## 2024-01-25 ENCOUNTER — TELEPHONE (OUTPATIENT)
Dept: FAMILY MEDICINE CLINIC | Age: 84
End: 2024-01-25
Payer: MEDICARE

## 2024-01-30 DIAGNOSIS — R19.7 DIARRHEA, UNSPECIFIED TYPE: ICD-10-CM

## 2024-01-30 RX ORDER — MONTELUKAST SODIUM 4 MG/1
1 TABLET, CHEWABLE ORAL 2 TIMES DAILY
Qty: 60 TABLET | Refills: 1 | Status: SHIPPED | OUTPATIENT
Start: 2024-01-30

## 2024-01-30 RX ORDER — OXYBUTYNIN CHLORIDE 15 MG/1
TABLET, EXTENDED RELEASE ORAL
Qty: 90 TABLET | Refills: 1 | Status: SHIPPED | OUTPATIENT
Start: 2024-01-30

## 2024-01-30 RX ORDER — DULOXETIN HYDROCHLORIDE 60 MG/1
CAPSULE, DELAYED RELEASE ORAL
Qty: 90 CAPSULE | Refills: 1 | Status: SHIPPED | OUTPATIENT
Start: 2024-01-30

## 2024-04-01 DIAGNOSIS — R19.7 DIARRHEA, UNSPECIFIED TYPE: ICD-10-CM

## 2024-04-01 RX ORDER — FUROSEMIDE 40 MG/1
TABLET ORAL
Qty: 90 TABLET | Refills: 0 | Status: SHIPPED | OUTPATIENT
Start: 2024-04-01

## 2024-04-01 RX ORDER — MONTELUKAST SODIUM 4 MG/1
1 TABLET, CHEWABLE ORAL 2 TIMES DAILY PRN
Qty: 60 TABLET | Refills: 0 | Status: SHIPPED | OUTPATIENT
Start: 2024-04-01

## 2024-04-26 DIAGNOSIS — M48.00 SPINAL STENOSIS, UNSPECIFIED SPINAL REGION: ICD-10-CM

## 2024-04-26 RX ORDER — GABAPENTIN 300 MG/1
300 CAPSULE ORAL
Qty: 30 CAPSULE | Refills: 2 | OUTPATIENT
Start: 2024-04-26

## 2024-04-30 DIAGNOSIS — M48.00 SPINAL STENOSIS, UNSPECIFIED SPINAL REGION: ICD-10-CM

## 2024-04-30 RX ORDER — GABAPENTIN 300 MG/1
300 CAPSULE ORAL
Qty: 30 CAPSULE | Refills: 2 | Status: SHIPPED | OUTPATIENT
Start: 2024-04-30

## 2024-05-14 DIAGNOSIS — E11.9 TYPE 2 DIABETES MELLITUS WITHOUT COMPLICATION, WITHOUT LONG-TERM CURRENT USE OF INSULIN: ICD-10-CM

## 2024-05-14 RX ORDER — BLOOD SUGAR DIAGNOSTIC
STRIP MISCELLANEOUS
Qty: 100 EACH | Refills: 3 | Status: SHIPPED | OUTPATIENT
Start: 2024-05-14

## 2024-05-28 ENCOUNTER — OFFICE VISIT (OUTPATIENT)
Dept: FAMILY MEDICINE CLINIC | Age: 84
End: 2024-05-28
Payer: MEDICARE

## 2024-05-28 ENCOUNTER — LAB (OUTPATIENT)
Dept: LAB | Facility: HOSPITAL | Age: 84
End: 2024-05-28
Payer: MEDICARE

## 2024-05-28 VITALS
HEIGHT: 59 IN | DIASTOLIC BLOOD PRESSURE: 58 MMHG | SYSTOLIC BLOOD PRESSURE: 109 MMHG | HEART RATE: 99 BPM | TEMPERATURE: 98.2 F | BODY MASS INDEX: 34.68 KG/M2 | WEIGHT: 172 LBS | OXYGEN SATURATION: 98 %

## 2024-05-28 DIAGNOSIS — Z23 NEED FOR VACCINATION: ICD-10-CM

## 2024-05-28 DIAGNOSIS — E03.9 ACQUIRED HYPOTHYROIDISM: ICD-10-CM

## 2024-05-28 DIAGNOSIS — I50.42 CHRONIC COMBINED SYSTOLIC AND DIASTOLIC HEART FAILURE: ICD-10-CM

## 2024-05-28 DIAGNOSIS — I25.10 CORONARY ARTERY DISEASE INVOLVING NATIVE CORONARY ARTERY OF NATIVE HEART WITHOUT ANGINA PECTORIS: ICD-10-CM

## 2024-05-28 DIAGNOSIS — I48.0 PAROXYSMAL ATRIAL FIBRILLATION: ICD-10-CM

## 2024-05-28 DIAGNOSIS — R63.4 WEIGHT LOSS: ICD-10-CM

## 2024-05-28 DIAGNOSIS — E11.9 TYPE 2 DIABETES MELLITUS WITHOUT COMPLICATION, WITHOUT LONG-TERM CURRENT USE OF INSULIN: Primary | ICD-10-CM

## 2024-05-28 DIAGNOSIS — R13.19 OTHER DYSPHAGIA: ICD-10-CM

## 2024-05-28 DIAGNOSIS — M15.0 PRIMARY GENERALIZED (OSTEO)ARTHRITIS: ICD-10-CM

## 2024-05-28 LAB
ALBUMIN SERPL-MCNC: 4.1 G/DL (ref 3.5–5.2)
ALBUMIN/GLOB SERPL: 1.5 G/DL
ALP SERPL-CCNC: 53 U/L (ref 39–117)
ALT SERPL W P-5'-P-CCNC: 14 U/L (ref 1–33)
ANION GAP SERPL CALCULATED.3IONS-SCNC: 10 MMOL/L (ref 5–15)
AST SERPL-CCNC: 12 U/L (ref 1–32)
BASOPHILS # BLD AUTO: 0.07 10*3/MM3 (ref 0–0.2)
BASOPHILS NFR BLD AUTO: 0.8 % (ref 0–1.5)
BILIRUB SERPL-MCNC: 0.3 MG/DL (ref 0–1.2)
BUN SERPL-MCNC: 13 MG/DL (ref 8–23)
BUN/CREAT SERPL: 18.1 (ref 7–25)
CALCIUM SPEC-SCNC: 9.1 MG/DL (ref 8.6–10.5)
CHLORIDE SERPL-SCNC: 99 MMOL/L (ref 98–107)
CHOLEST SERPL-MCNC: 123 MG/DL (ref 0–200)
CO2 SERPL-SCNC: 28 MMOL/L (ref 22–29)
CREAT SERPL-MCNC: 0.72 MG/DL (ref 0.57–1)
DEPRECATED RDW RBC AUTO: 55 FL (ref 37–54)
EGFRCR SERPLBLD CKD-EPI 2021: 81.5 ML/MIN/1.73
EOSINOPHIL # BLD AUTO: 0.2 10*3/MM3 (ref 0–0.4)
EOSINOPHIL NFR BLD AUTO: 2.3 % (ref 0.3–6.2)
ERYTHROCYTE [DISTWIDTH] IN BLOOD BY AUTOMATED COUNT: 15.3 % (ref 12.3–15.4)
GLOBULIN UR ELPH-MCNC: 2.7 GM/DL
GLUCOSE SERPL-MCNC: 113 MG/DL (ref 65–99)
HBA1C MFR BLD: 6.4 % (ref 4.8–5.6)
HCT VFR BLD AUTO: 41.9 % (ref 34–46.6)
HDLC SERPL-MCNC: 56 MG/DL (ref 40–60)
HGB BLD-MCNC: 12.8 G/DL (ref 12–15.9)
IMM GRANULOCYTES # BLD AUTO: 0.01 10*3/MM3 (ref 0–0.05)
IMM GRANULOCYTES NFR BLD AUTO: 0.1 % (ref 0–0.5)
LDLC SERPL CALC-MCNC: 49 MG/DL (ref 0–100)
LDLC/HDLC SERPL: 0.85 {RATIO}
LYMPHOCYTES # BLD AUTO: 1.77 10*3/MM3 (ref 0.7–3.1)
LYMPHOCYTES NFR BLD AUTO: 20.5 % (ref 19.6–45.3)
MCH RBC QN AUTO: 29.2 PG (ref 26.6–33)
MCHC RBC AUTO-ENTMCNC: 30.5 G/DL (ref 31.5–35.7)
MCV RBC AUTO: 95.7 FL (ref 79–97)
MONOCYTES # BLD AUTO: 0.73 10*3/MM3 (ref 0.1–0.9)
MONOCYTES NFR BLD AUTO: 8.4 % (ref 5–12)
NEUTROPHILS NFR BLD AUTO: 5.87 10*3/MM3 (ref 1.7–7)
NEUTROPHILS NFR BLD AUTO: 67.9 % (ref 42.7–76)
PLATELET # BLD AUTO: 302 10*3/MM3 (ref 140–450)
PMV BLD AUTO: 8.4 FL (ref 6–12)
POTASSIUM SERPL-SCNC: 4.4 MMOL/L (ref 3.5–5.2)
PROT SERPL-MCNC: 6.8 G/DL (ref 6–8.5)
RBC # BLD AUTO: 4.38 10*6/MM3 (ref 3.77–5.28)
SODIUM SERPL-SCNC: 137 MMOL/L (ref 136–145)
TRIGL SERPL-MCNC: 96 MG/DL (ref 0–150)
TSH SERPL DL<=0.05 MIU/L-ACNC: 1.17 UIU/ML (ref 0.27–4.2)
VLDLC SERPL-MCNC: 18 MG/DL (ref 5–40)
WBC NRBC COR # BLD AUTO: 8.65 10*3/MM3 (ref 3.4–10.8)

## 2024-05-28 PROCEDURE — 99214 OFFICE O/P EST MOD 30 MIN: CPT | Performed by: FAMILY MEDICINE

## 2024-05-28 PROCEDURE — 36415 COLL VENOUS BLD VENIPUNCTURE: CPT | Performed by: FAMILY MEDICINE

## 2024-05-28 PROCEDURE — 90480 ADMN SARSCOV2 VAC 1/ONLY CMP: CPT | Performed by: FAMILY MEDICINE

## 2024-05-28 PROCEDURE — 85025 COMPLETE CBC W/AUTO DIFF WBC: CPT | Performed by: FAMILY MEDICINE

## 2024-05-28 PROCEDURE — 83036 HEMOGLOBIN GLYCOSYLATED A1C: CPT | Performed by: FAMILY MEDICINE

## 2024-05-28 PROCEDURE — 91320 SARSCV2 VAC 30MCG TRS-SUC IM: CPT | Performed by: FAMILY MEDICINE

## 2024-05-28 PROCEDURE — 80061 LIPID PANEL: CPT | Performed by: FAMILY MEDICINE

## 2024-05-28 PROCEDURE — 84443 ASSAY THYROID STIM HORMONE: CPT | Performed by: FAMILY MEDICINE

## 2024-05-28 PROCEDURE — 80053 COMPREHEN METABOLIC PANEL: CPT | Performed by: FAMILY MEDICINE

## 2024-05-28 PROCEDURE — 1126F AMNT PAIN NOTED NONE PRSNT: CPT | Performed by: FAMILY MEDICINE

## 2024-05-28 NOTE — PROGRESS NOTES
"Chief Complaint  Diabetes (6 month s/u. )    Subjective          Radha Arana presents to [facility]  History of Present Illness    Coming to the visit by her daughter, Honey  For the most part she feels like she is doing okay.  Did note that look like she had lost some weight.  She is not having any fever chills or night sweats.  Her diarrhea issues have pretty much resolved.  Has noted some return of swallowing issues.  Previously had been diagnosed with suspected candidal esophagitis treated with Diflucan.    BS 99 this AM, usually runs around 115 in AM      Current Outpatient Medications on File Prior to Visit   Medication Sig Dispense Refill    albuterol sulfate  (90 Base) MCG/ACT inhaler Inhale 2 puffs Every 4 (Four) Hours As Needed for Wheezing or Shortness of Air. 18 g 0    alendronate (Fosamax) 70 MG tablet Take on empty stomach with glass of water. Wait 30 min before any other intake and stay upright. 12 tablet 3    atorvastatin (LIPITOR) 80 MG tablet       clopidogrel (PLAVIX) 75 MG tablet Take 1 tablet by mouth Daily.      DULoxetine (CYMBALTA) 60 MG capsule TAKE ONE CAPSULE BY MOUTH DAILY 90 capsule 1    Eliquis 5 MG tablet tablet 1 tablet 2 (Two) Times a Day.      furosemide (LASIX) 40 MG tablet TAKE ONE TABLET BY MOUTH EVERY DAY 90 tablet 0    gabapentin (NEURONTIN) 300 MG capsule TAKE ONE CAPSULE BY MOUTH EVERY NIGHT AT BEDTIME 30 capsule 2    Gauze Pads & Dressings (Telfa Non-Adherent) 3\"X4\" pads 1 pad Daily. 14 each 0    glucose blood (OneTouch Ultra) test strip USE TO CHECK BLOOD SUGAR ONCE DAILY WITH 100 each 3    guaiFENesin (MUCINEX) 600 MG 12 hr tablet Take 2 tablets by mouth Daily.      ipratropium-albuterol (DUO-NEB) 0.5-2.5 mg/3 ml nebulizer Take 3 mL by nebulization Every 4 (Four) Hours As Needed for Wheezing or Shortness of Air. 360 mL 1    Iron, Ferrous Sulfate, 325 (65 Fe) MG tablet Take 325 mg by mouth Every Morning Before Breakfast. Take along " "with OJ or Vit C tablet. 100 tablet 0    lisinopril (PRINIVIL,ZESTRIL) 10 MG tablet TAKE ONE TABLET EVERY DAY 90 tablet 1    Melatonin 10 MG tablet Take 1 tablet by mouth Every Night.      multivitamin with minerals tablet tablet Take 1 tablet by mouth Daily.      nitroglycerin (Nitrostat) 0.4 MG SL tablet Place 1 tablet under the tongue Every 5 (Five) Minutes As Needed for Chest Pain. Take no more than 3 doses in 15 minutes. 25 tablet 1    O2 (OXYGEN) Inhale 1 (One) Time.      oxybutynin XL (DITROPAN XL) 15 MG 24 hr tablet TAKE ONE TABLET BY MOUTH DAILY 90 tablet 1    Probiotic Product (PROBIOTIC DAILY PO) Take  by mouth.       No current facility-administered medications on file prior to visit.       Review of Systems         Objective   Vital Signs:   /58 (BP Location: Right arm, Patient Position: Sitting)   Pulse 99   Temp 98.2 °F (36.8 °C) (Temporal)   Ht 149.9 cm (59.02\")   Wt 78 kg (172 lb)   SpO2 98% Comment: 2 liters  BMI 34.72 kg/m²     Physical Exam     No acute distress  Weight loss is noticeable in her face  Color is good  In good spirits  Tympanic membrane clear  Oropharynx clear  No cervical or supraclavicular adenopathy  Heart is irregular with controlled response  Lungs are clear  Abdomen soft nontender  Lower extremities without edema  Neurologic without gross lateralizing focal deficit           Result Review :   The following data was reviewed by: Rell Harmon MD on 05/28/2024:    Microalbumin / Creatinine Urine Ratio - Urine, Clean Catch (01/22/2024 11:30)  Celiac Disease Antibody Screen (01/22/2024 11:23)  Basic Metabolic Panel (01/22/2024 11:23)  Reticulocytes (01/22/2024 11:23)  CBC & Differential (01/22/2024 11:23)  Ferritin (01/22/2024 11:23)  Iron Profile (01/22/2024 11:23)              Assessment and Plan    Diagnoses and all orders for this visit:    1. Type 2 diabetes mellitus without complication, without long-term current use of insulin (Primary)  Assessment & " Plan:  Will check labs predicate medication changes based on result     Orders:  -     Hemoglobin A1c  -     Lipid Panel  -     Comprehensive Metabolic Panel  -     CBC Auto Differential    2. Acquired hypothyroidism  Assessment & Plan:  Check lab predicate medication change based on results    Orders:  -     TSH Rfx On Abnormal To Free T4    3. Chronic combined systolic and diastolic heart failure  Assessment & Plan:  Cardiac status seems fairly well compensated at present.  Continue on current regimen.  Follow-up with cardiology as recommended      4. Paroxysmal atrial fibrillation  Assessment & Plan:  Rate is controlled.  Anticoagulated.      5. Primary generalized (osteo)arthritis  Assessment & Plan:  She remains functionally limited as a result of her osteoarthritis but she is still able to meet her ADLs within the home.      6. Coronary artery disease involving native coronary artery of native heart without angina pectoris  Assessment & Plan:  Continue risk factor reduction strategies.  Follow-up with cardiology as recommended       7. Weight loss  Assessment & Plan:  While at glad to see that her weight is down some, it is concerning that she is not making extra effort at weight loss.  Will check labs.  Honey will check with Dr. Doll to see if she needs retreatment or perhaps repeat study.    Orders:  -     TSH Rfx On Abnormal To Free T4    8. Need for vaccination  -     COVID-19 F23 (Pfizer) 12yrs+ (COMIRNATY)    9. Other dysphagia  Assessment & Plan:  Honey says she will reach out to her gastroenterologist, Dr. Doll, to see if she should have another round of Diflucan or perhaps have a repeat studies.  If she does have another episode of candidal esophagitis would have to wonder why she is susceptible.          Follow Up   No follow-ups on file.  Patient was given instructions and counseling regarding her condition or for health maintenance advice. Please see specific information pulled into the AVS if  appropriate.

## 2024-05-30 DIAGNOSIS — E87.6 HYPOKALEMIA: ICD-10-CM

## 2024-05-30 RX ORDER — LEVOTHYROXINE SODIUM 0.03 MG/1
TABLET ORAL
Qty: 90 TABLET | Refills: 1 | Status: SHIPPED | OUTPATIENT
Start: 2024-05-30

## 2024-05-30 RX ORDER — PANTOPRAZOLE SODIUM 40 MG/1
40 TABLET, DELAYED RELEASE ORAL DAILY
Qty: 90 TABLET | Refills: 1 | Status: SHIPPED | OUTPATIENT
Start: 2024-05-30

## 2024-05-30 RX ORDER — POTASSIUM CHLORIDE 20 MEQ/1
TABLET, EXTENDED RELEASE ORAL
Qty: 180 TABLET | Refills: 1 | Status: SHIPPED | OUTPATIENT
Start: 2024-05-30

## 2024-05-31 DIAGNOSIS — R19.7 DIARRHEA, UNSPECIFIED TYPE: ICD-10-CM

## 2024-06-01 RX ORDER — MONTELUKAST SODIUM 4 MG/1
1 TABLET, CHEWABLE ORAL 2 TIMES DAILY
Qty: 60 TABLET | Refills: 2 | Status: SHIPPED | OUTPATIENT
Start: 2024-06-01

## 2024-06-04 ENCOUNTER — TELEPHONE (OUTPATIENT)
Dept: FAMILY MEDICINE CLINIC | Age: 84
End: 2024-06-04

## 2024-06-04 NOTE — TELEPHONE ENCOUNTER
Dr. Harmon your note is not signed, so Im unsure if you all discussed this at her visit.  I see Pantoprazole 40mg once daily/

## 2024-06-04 NOTE — TELEPHONE ENCOUNTER
Caller: JOELLEN SCHMID    Relationship: Emergency Contact    Best call back number: [phone]     Which medication are you concerned about: PANTOPRAZOLE VS OMEPRAZOLE    Who prescribed you this medication: DR CONTE    What are your concerns: PLEASE CALL TO CONFIRM WHICH MEDICATION SHOULD BE TAKEN TWICE DAILY AND AT WHAT DOSAGE.         GRIS ORR, CALL PREFERRED MAY LEAVE VOICEMAIL.

## 2024-06-09 PROBLEM — R13.19 OTHER DYSPHAGIA: Status: ACTIVE | Noted: 2024-06-09

## 2024-06-09 NOTE — ASSESSMENT & PLAN NOTE
Cardiac status seems fairly well compensated at present.  Continue on current regimen.  Follow-up with cardiology as recommended

## 2024-06-09 NOTE — ASSESSMENT & PLAN NOTE
Honey says she will reach out to her gastroenterologist, Dr. Doll, to see if she should have another round of Diflucan or perhaps have a repeat studies.  If she does have another episode of candidal esophagitis would have to wonder why she is susceptible.

## 2024-06-09 NOTE — TELEPHONE ENCOUNTER
Pantoprazole 40 mg once daily as far as I remember.  Find out if her daughter, Honey, ever got in touch with Dr. Doll the gastroenterologist regarding her swallowing issues whether she needed to repeat the Diflucan or possibly consider repeat EGD.  Find out from Honey what the outcome of that discussion was

## 2024-06-09 NOTE — ASSESSMENT & PLAN NOTE
She remains functionally limited as a result of her osteoarthritis but she is still able to meet her ADLs within the home.

## 2024-06-09 NOTE — ASSESSMENT & PLAN NOTE
While at glad to see that her weight is down some, it is concerning that she is not making extra effort at weight loss.  Will check labs.  Honey will check with Dr. Doll to see if she needs retreatment or perhaps repeat study.

## 2024-06-10 NOTE — TELEPHONE ENCOUNTER
Spoke to Honey, she spoke to Dr. Doll and he wants her to take Pantoprazole 40mg bid x 2 weeks and then let him know how she is doing and if she is no better, he will plan to do a scope. She is asking if you will send in the rx for the Pantoprazole 40mg bid x2 weeks?

## 2024-06-12 DIAGNOSIS — K20.90 ESOPHAGITIS: Primary | ICD-10-CM

## 2024-06-12 RX ORDER — PANTOPRAZOLE SODIUM 40 MG/1
40 TABLET, DELAYED RELEASE ORAL 2 TIMES DAILY
Qty: 28 TABLET | Refills: 0 | Status: SHIPPED | OUTPATIENT
Start: 2024-06-12

## 2024-07-01 DIAGNOSIS — K20.90 ESOPHAGITIS: ICD-10-CM

## 2024-07-01 RX ORDER — PANTOPRAZOLE SODIUM 40 MG/1
40 TABLET, DELAYED RELEASE ORAL 2 TIMES DAILY
Qty: 56 TABLET | Refills: 3 | Status: SHIPPED | OUTPATIENT
Start: 2024-07-01

## 2024-07-01 RX ORDER — FUROSEMIDE 40 MG/1
TABLET ORAL
Qty: 90 TABLET | Refills: 0 | Status: SHIPPED | OUTPATIENT
Start: 2024-07-01

## 2024-07-29 DIAGNOSIS — M48.00 SPINAL STENOSIS, UNSPECIFIED SPINAL REGION: ICD-10-CM

## 2024-07-30 RX ORDER — GABAPENTIN 300 MG/1
300 CAPSULE ORAL
Qty: 30 CAPSULE | Refills: 1 | Status: SHIPPED | OUTPATIENT
Start: 2024-07-30

## 2024-07-30 RX ORDER — OXYBUTYNIN CHLORIDE 15 MG/1
TABLET, EXTENDED RELEASE ORAL
Qty: 90 TABLET | Refills: 1 | Status: SHIPPED | OUTPATIENT
Start: 2024-07-30

## 2024-07-30 RX ORDER — DULOXETIN HYDROCHLORIDE 60 MG/1
CAPSULE, DELAYED RELEASE ORAL
Qty: 90 CAPSULE | Refills: 1 | Status: SHIPPED | OUTPATIENT
Start: 2024-07-30

## 2024-08-07 ENCOUNTER — TELEPHONE (OUTPATIENT)
Dept: FAMILY MEDICINE CLINIC | Age: 84
End: 2024-08-07
Payer: MEDICARE

## 2024-08-07 NOTE — TELEPHONE ENCOUNTER
"     Caller: Radha Arana \"Shayy\"    Relationship: Self    Best call back number: [phone]     Additional notes:         THE PATIENT SAID THE  MOTOR HAS EXCEEDED   ON HER BI-PAP MACHINE. SHE SAID SHE IS NEEDING AN ORDER PUT IN TO   [facility]     "

## 2024-08-07 NOTE — TELEPHONE ENCOUNTER
"  Caller: Sumit Radha KALPANA \"Shayy\"    Relationship: Self    Best call back number: [phone]    What is the best time to reach you: ANYTIME     Who are you requesting to speak with (clinical staff, provider,  specific staff member): CLINICAL         What was the call regarding:      THE PATIENT IS REQUESTING A CALL FOR AN UPDATE ON THE BIPAP MACHINE. HUB INFORMED PATIENT THAT IT IS JUST WAITING ON PCP STILES. SHE IS REQUESTING A CALL TO ADVISED WHEN IT HAS BEEN SENT TO [facility].     "

## 2024-08-08 NOTE — TELEPHONE ENCOUNTER
Spoke to pt, she says that her daughter Honey has been trying to get a hold of Dr. Herzog office and the phone picks up and hangs up. I tried to call and got the same result.  I spoke to Sara and asked if they would fax an order for a new machine to Dr. Herzog office and she states that she would do that today.  I advised Honey

## 2024-08-26 ENCOUNTER — TELEPHONE (OUTPATIENT)
Dept: FAMILY MEDICINE CLINIC | Age: 84
End: 2024-08-26
Payer: MEDICARE

## 2024-08-26 DIAGNOSIS — E11.9 TYPE 2 DIABETES MELLITUS WITHOUT COMPLICATION, WITHOUT LONG-TERM CURRENT USE OF INSULIN: ICD-10-CM

## 2024-08-26 RX ORDER — BLOOD SUGAR DIAGNOSTIC
1 STRIP MISCELLANEOUS DAILY
Qty: 100 EACH | Refills: 2 | Status: SHIPPED | OUTPATIENT
Start: 2024-08-26

## 2024-08-26 NOTE — TELEPHONE ENCOUNTER
"  Caller: Radha Arana \"Shayy\"    Relationship: Self    Best call back number: [phone]     Requested Prescriptions:   Requested Prescriptions     Pending Prescriptions Disp Refills    glucose blood (OneTouch Ultra) test strip 100 each 3     Sig: Use as instructed        Pharmacy where request should be sent: [facility] [address]  [place] - [phone]  - [phone] FX     Last office visit with prescribing clinician: 5/28/2024   Last telemedicine visit with prescribing clinician: Visit date not found   Next office visit with prescribing clinician: 12/2/2024     Additional details provided by patient: NONE ON HAND.     Does the patient have less than a 3 day supply:  [x] Yes  [] No      08/26/24 11:13 EDT            "

## 2024-08-29 DIAGNOSIS — R19.7 DIARRHEA, UNSPECIFIED TYPE: ICD-10-CM

## 2024-08-29 RX ORDER — MONTELUKAST SODIUM 4 MG/1
1 TABLET, CHEWABLE ORAL 2 TIMES DAILY
Qty: 60 TABLET | Refills: 2 | Status: SHIPPED | OUTPATIENT
Start: 2024-08-29

## 2024-09-26 DIAGNOSIS — K20.90 ESOPHAGITIS: ICD-10-CM

## 2024-09-26 DIAGNOSIS — M48.00 SPINAL STENOSIS, UNSPECIFIED SPINAL REGION: ICD-10-CM

## 2024-09-27 RX ORDER — PANTOPRAZOLE SODIUM 40 MG/1
40 TABLET, DELAYED RELEASE ORAL 2 TIMES DAILY
Qty: 56 TABLET | Refills: 0 | Status: SHIPPED | OUTPATIENT
Start: 2024-09-27

## 2024-09-27 RX ORDER — GABAPENTIN 300 MG/1
300 CAPSULE ORAL
Qty: 30 CAPSULE | Refills: 1 | Status: SHIPPED | OUTPATIENT
Start: 2024-09-27

## 2024-09-27 RX ORDER — FUROSEMIDE 40 MG
TABLET ORAL
Qty: 90 TABLET | Refills: 0 | Status: SHIPPED | OUTPATIENT
Start: 2024-09-27

## 2024-10-25 DIAGNOSIS — K20.90 ESOPHAGITIS: ICD-10-CM

## 2024-10-26 DIAGNOSIS — M81.0 AGE-RELATED OSTEOPOROSIS WITHOUT CURRENT PATHOLOGICAL FRACTURE: ICD-10-CM

## 2024-10-26 RX ORDER — PANTOPRAZOLE SODIUM 40 MG/1
40 TABLET, DELAYED RELEASE ORAL 2 TIMES DAILY
Qty: 60 TABLET | Refills: 1 | Status: SHIPPED | OUTPATIENT
Start: 2024-10-26

## 2024-10-28 RX ORDER — ALENDRONATE SODIUM 70 MG/1
TABLET ORAL
Qty: 12 TABLET | Refills: 3 | OUTPATIENT
Start: 2024-10-28

## 2024-11-07 DIAGNOSIS — M81.0 AGE-RELATED OSTEOPOROSIS WITHOUT CURRENT PATHOLOGICAL FRACTURE: ICD-10-CM

## 2024-11-07 RX ORDER — ALENDRONATE SODIUM 70 MG/1
TABLET ORAL
Qty: 12 TABLET | Refills: 3 | OUTPATIENT
Start: 2024-11-07

## 2024-11-09 DIAGNOSIS — M81.0 AGE-RELATED OSTEOPOROSIS WITHOUT CURRENT PATHOLOGICAL FRACTURE: ICD-10-CM

## 2024-11-11 RX ORDER — ALENDRONATE SODIUM 70 MG/1
TABLET ORAL
Qty: 12 TABLET | Refills: 3 | OUTPATIENT
Start: 2024-11-11

## 2024-11-13 DIAGNOSIS — M81.0 AGE-RELATED OSTEOPOROSIS WITHOUT CURRENT PATHOLOGICAL FRACTURE: ICD-10-CM

## 2024-11-13 RX ORDER — ALENDRONATE SODIUM 70 MG/1
TABLET ORAL
Qty: 12 TABLET | Refills: 0 | Status: SHIPPED | OUTPATIENT
Start: 2024-11-13

## 2024-11-26 DIAGNOSIS — E87.6 HYPOKALEMIA: ICD-10-CM

## 2024-11-26 DIAGNOSIS — M48.00 SPINAL STENOSIS, UNSPECIFIED SPINAL REGION: ICD-10-CM

## 2024-11-26 DIAGNOSIS — R19.7 DIARRHEA, UNSPECIFIED TYPE: ICD-10-CM

## 2024-11-27 RX ORDER — LEVOTHYROXINE SODIUM 25 UG/1
TABLET ORAL
Qty: 90 TABLET | Refills: 1 | Status: SHIPPED | OUTPATIENT
Start: 2024-11-27

## 2024-11-27 RX ORDER — POTASSIUM CHLORIDE 1500 MG/1
TABLET, EXTENDED RELEASE ORAL
Qty: 180 TABLET | Refills: 1 | Status: SHIPPED | OUTPATIENT
Start: 2024-11-27

## 2024-11-27 RX ORDER — MONTELUKAST SODIUM 4 MG/1
1 TABLET, CHEWABLE ORAL 2 TIMES DAILY
Qty: 60 TABLET | Refills: 1 | Status: SHIPPED | OUTPATIENT
Start: 2024-11-27

## 2024-11-27 RX ORDER — GABAPENTIN 300 MG/1
300 CAPSULE ORAL
Qty: 30 CAPSULE | Refills: 1 | Status: SHIPPED | OUTPATIENT
Start: 2024-11-27

## 2024-12-02 ENCOUNTER — TELEPHONE (OUTPATIENT)
Dept: FAMILY MEDICINE CLINIC | Age: 84
End: 2024-12-02

## 2024-12-02 ENCOUNTER — OFFICE VISIT (OUTPATIENT)
Dept: FAMILY MEDICINE CLINIC | Age: 84
End: 2024-12-02
Payer: MEDICARE

## 2024-12-02 ENCOUNTER — LAB (OUTPATIENT)
Dept: LAB | Facility: HOSPITAL | Age: 84
End: 2024-12-02
Payer: MEDICARE

## 2024-12-02 VITALS
HEART RATE: 76 BPM | DIASTOLIC BLOOD PRESSURE: 62 MMHG | HEIGHT: 59 IN | OXYGEN SATURATION: 99 % | WEIGHT: 169 LBS | SYSTOLIC BLOOD PRESSURE: 125 MMHG | BODY MASS INDEX: 34.07 KG/M2 | TEMPERATURE: 99.3 F

## 2024-12-02 DIAGNOSIS — E66.811 CLASS 1 OBESITY DUE TO EXCESS CALORIES WITH SERIOUS COMORBIDITY AND BODY MASS INDEX (BMI) OF 32.0 TO 32.9 IN ADULT: ICD-10-CM

## 2024-12-02 DIAGNOSIS — M48.00 SPINAL STENOSIS, UNSPECIFIED SPINAL REGION: ICD-10-CM

## 2024-12-02 DIAGNOSIS — Z91.81 HISTORY OF FALL: ICD-10-CM

## 2024-12-02 DIAGNOSIS — E78.2 MIXED HYPERLIPIDEMIA: ICD-10-CM

## 2024-12-02 DIAGNOSIS — Z00.00 MEDICARE ANNUAL WELLNESS VISIT, SUBSEQUENT: Primary | ICD-10-CM

## 2024-12-02 DIAGNOSIS — M81.0 AGE-RELATED OSTEOPOROSIS WITHOUT CURRENT PATHOLOGICAL FRACTURE: ICD-10-CM

## 2024-12-02 DIAGNOSIS — E66.09 CLASS 1 OBESITY DUE TO EXCESS CALORIES WITH SERIOUS COMORBIDITY AND BODY MASS INDEX (BMI) OF 32.0 TO 32.9 IN ADULT: ICD-10-CM

## 2024-12-02 DIAGNOSIS — E03.9 ACQUIRED HYPOTHYROIDISM: ICD-10-CM

## 2024-12-02 DIAGNOSIS — I48.0 PAROXYSMAL ATRIAL FIBRILLATION: ICD-10-CM

## 2024-12-02 DIAGNOSIS — Z79.899 LONG-TERM USE OF HIGH-RISK MEDICATION: ICD-10-CM

## 2024-12-02 DIAGNOSIS — D50.9 IRON DEFICIENCY ANEMIA, UNSPECIFIED IRON DEFICIENCY ANEMIA TYPE: ICD-10-CM

## 2024-12-02 DIAGNOSIS — E11.9 TYPE 2 DIABETES MELLITUS WITHOUT COMPLICATION, WITHOUT LONG-TERM CURRENT USE OF INSULIN: ICD-10-CM

## 2024-12-02 DIAGNOSIS — M15.0 PRIMARY GENERALIZED (OSTEO)ARTHRITIS: ICD-10-CM

## 2024-12-02 DIAGNOSIS — I25.10 CORONARY ARTERY DISEASE INVOLVING NATIVE CORONARY ARTERY OF NATIVE HEART WITHOUT ANGINA PECTORIS: ICD-10-CM

## 2024-12-02 DIAGNOSIS — I50.42 CHRONIC COMBINED SYSTOLIC AND DIASTOLIC HEART FAILURE: ICD-10-CM

## 2024-12-02 LAB
ALBUMIN SERPL-MCNC: 3.9 G/DL (ref 3.5–5.2)
ALBUMIN/GLOB SERPL: 1.6 G/DL
ALP SERPL-CCNC: 54 U/L (ref 39–117)
ALT SERPL W P-5'-P-CCNC: 15 U/L (ref 1–33)
ANION GAP SERPL CALCULATED.3IONS-SCNC: 13 MMOL/L (ref 5–15)
AST SERPL-CCNC: 16 U/L (ref 1–32)
BASOPHILS # BLD AUTO: 0.05 10*3/MM3 (ref 0–0.2)
BASOPHILS NFR BLD AUTO: 0.3 % (ref 0–1.5)
BILIRUB SERPL-MCNC: 0.3 MG/DL (ref 0–1.2)
BUN SERPL-MCNC: 19 MG/DL (ref 8–23)
BUN/CREAT SERPL: 22.1 (ref 7–25)
CALCIUM SPEC-SCNC: 8.9 MG/DL (ref 8.6–10.5)
CHLORIDE SERPL-SCNC: 97 MMOL/L (ref 98–107)
CHOLEST SERPL-MCNC: 148 MG/DL (ref 0–200)
CO2 SERPL-SCNC: 25 MMOL/L (ref 22–29)
CREAT SERPL-MCNC: 0.86 MG/DL (ref 0.57–1)
DEPRECATED RDW RBC AUTO: 49 FL (ref 37–54)
EGFRCR SERPLBLD CKD-EPI 2021: 65.9 ML/MIN/1.73
EOSINOPHIL # BLD AUTO: 0.2 10*3/MM3 (ref 0–0.4)
EOSINOPHIL NFR BLD AUTO: 1.3 % (ref 0.3–6.2)
ERYTHROCYTE [DISTWIDTH] IN BLOOD BY AUTOMATED COUNT: 13.9 % (ref 12.3–15.4)
FERRITIN SERPL-MCNC: 28.8 NG/ML (ref 13–150)
FOLATE SERPL-MCNC: >20 NG/ML (ref 4.78–24.2)
GLOBULIN UR ELPH-MCNC: 2.5 GM/DL
GLUCOSE SERPL-MCNC: 150 MG/DL (ref 65–99)
HBA1C MFR BLD: 6.4 % (ref 4.8–5.6)
HCT VFR BLD AUTO: 41.4 % (ref 34–46.6)
HDLC SERPL-MCNC: 60 MG/DL (ref 40–60)
HGB BLD-MCNC: 13 G/DL (ref 12–15.9)
IMM GRANULOCYTES # BLD AUTO: 0.04 10*3/MM3 (ref 0–0.05)
IMM GRANULOCYTES NFR BLD AUTO: 0.3 % (ref 0–0.5)
IRON 24H UR-MRATE: 35 MCG/DL (ref 37–145)
IRON SATN MFR SERPL: 8 % (ref 20–50)
LDLC SERPL CALC-MCNC: 64 MG/DL (ref 0–100)
LDLC/HDLC SERPL: 1 {RATIO}
LYMPHOCYTES # BLD AUTO: 1.81 10*3/MM3 (ref 0.7–3.1)
LYMPHOCYTES NFR BLD AUTO: 11.7 % (ref 19.6–45.3)
MCH RBC QN AUTO: 29.7 PG (ref 26.6–33)
MCHC RBC AUTO-ENTMCNC: 31.4 G/DL (ref 31.5–35.7)
MCV RBC AUTO: 94.5 FL (ref 79–97)
MONOCYTES # BLD AUTO: 0.95 10*3/MM3 (ref 0.1–0.9)
MONOCYTES NFR BLD AUTO: 6.1 % (ref 5–12)
NEUTROPHILS NFR BLD AUTO: 12.46 10*3/MM3 (ref 1.7–7)
NEUTROPHILS NFR BLD AUTO: 80.3 % (ref 42.7–76)
PLATELET # BLD AUTO: 284 10*3/MM3 (ref 140–450)
PMV BLD AUTO: 8.3 FL (ref 6–12)
POTASSIUM SERPL-SCNC: 3.8 MMOL/L (ref 3.5–5.2)
PROT SERPL-MCNC: 6.4 G/DL (ref 6–8.5)
RBC # BLD AUTO: 4.38 10*6/MM3 (ref 3.77–5.28)
RETICS # AUTO: 0.07 10*6/MM3 (ref 0.02–0.13)
RETICS/RBC NFR AUTO: 1.52 % (ref 0.7–1.9)
SODIUM SERPL-SCNC: 135 MMOL/L (ref 136–145)
TIBC SERPL-MCNC: 426 MCG/DL (ref 298–536)
TRANSFERRIN SERPL-MCNC: 286 MG/DL (ref 200–360)
TRIGL SERPL-MCNC: 141 MG/DL (ref 0–150)
TSH SERPL DL<=0.05 MIU/L-ACNC: 0.67 UIU/ML (ref 0.27–4.2)
VIT B12 BLD-MCNC: 647 PG/ML (ref 211–946)
VLDLC SERPL-MCNC: 24 MG/DL (ref 5–40)
WBC NRBC COR # BLD AUTO: 15.51 10*3/MM3 (ref 3.4–10.8)

## 2024-12-02 PROCEDURE — 80053 COMPREHEN METABOLIC PANEL: CPT | Performed by: FAMILY MEDICINE

## 2024-12-02 PROCEDURE — 82607 VITAMIN B-12: CPT | Performed by: FAMILY MEDICINE

## 2024-12-02 PROCEDURE — 36415 COLL VENOUS BLD VENIPUNCTURE: CPT | Performed by: FAMILY MEDICINE

## 2024-12-02 PROCEDURE — 83036 HEMOGLOBIN GLYCOSYLATED A1C: CPT | Performed by: FAMILY MEDICINE

## 2024-12-02 PROCEDURE — 82728 ASSAY OF FERRITIN: CPT | Performed by: FAMILY MEDICINE

## 2024-12-02 PROCEDURE — 85045 AUTOMATED RETICULOCYTE COUNT: CPT | Performed by: FAMILY MEDICINE

## 2024-12-02 PROCEDURE — 83540 ASSAY OF IRON: CPT | Performed by: FAMILY MEDICINE

## 2024-12-02 PROCEDURE — 80061 LIPID PANEL: CPT | Performed by: FAMILY MEDICINE

## 2024-12-02 PROCEDURE — 84443 ASSAY THYROID STIM HORMONE: CPT | Performed by: FAMILY MEDICINE

## 2024-12-02 PROCEDURE — 84466 ASSAY OF TRANSFERRIN: CPT | Performed by: FAMILY MEDICINE

## 2024-12-02 PROCEDURE — 82746 ASSAY OF FOLIC ACID SERUM: CPT | Performed by: FAMILY MEDICINE

## 2024-12-02 PROCEDURE — 85025 COMPLETE CBC W/AUTO DIFF WBC: CPT | Performed by: FAMILY MEDICINE

## 2024-12-02 NOTE — ASSESSMENT & PLAN NOTE
Since her hemoglobin A1c has been so well-controlled October checking fingerstick blood sugar is optional.  If she would feels like it is important to do so we can order her a new glucometer.  If she wants to take a break from poking her fingers that would be okay as well.  Check lab and predicate change based on results.  Also discussed importance of foot inspection and care.  She would qualify for diabetic foot wear.  Orders:    Comprehensive Metabolic Panel    Hemoglobin A1c

## 2024-12-02 NOTE — ASSESSMENT & PLAN NOTE
DEXA scan 2023 will repeat next year.  Continue the Fosamax for total of 5 years (4 more years to go).

## 2024-12-02 NOTE — ASSESSMENT & PLAN NOTE
Heart failure is well compensated.  She had actually seen cardiology earlier in the day and reviewed that note.  She will be getting echocardiogram next visit.

## 2024-12-02 NOTE — ASSESSMENT & PLAN NOTE
No current symptoms of ischemic heart disease.  Continue risk factor reduction strategies and  follow with cardiology as recommended.

## 2024-12-02 NOTE — ASSESSMENT & PLAN NOTE
Patient's (Body mass index is 34.11 kg/m².) indicates that they are obese (BMI >30) with health conditions that include hypertension, diabetes mellitus, dyslipidemias, and osteoarthritis . Weight is improving with lifestyle modifications. BMI  is above average; BMI management plan is completed. We discussed portion control.  Commended her for progress she has made at weight loss.  Encouraged continued efforts.

## 2024-12-02 NOTE — ASSESSMENT & PLAN NOTE
Follow-up labs  Orders:    Iron Profile    Ferritin    CBC & Differential    Reticulocytes    Vitamin B12    Folate

## 2024-12-02 NOTE — TELEPHONE ENCOUNTER
Reports she had diabetic eye exam couple weeks ago at [facility].  Please call get a copy of the report and update EMR

## 2024-12-03 ENCOUNTER — CLINICAL SUPPORT (OUTPATIENT)
Dept: FAMILY MEDICINE CLINIC | Age: 84
End: 2024-12-03
Payer: MEDICARE

## 2024-12-03 DIAGNOSIS — D50.9 IRON DEFICIENCY ANEMIA, UNSPECIFIED IRON DEFICIENCY ANEMIA TYPE: Primary | ICD-10-CM

## 2024-12-03 DIAGNOSIS — D72.829 LEUKOCYTOSIS, UNSPECIFIED TYPE: ICD-10-CM

## 2024-12-03 DIAGNOSIS — Z79.899 LONG-TERM USE OF HIGH-RISK MEDICATION: ICD-10-CM

## 2024-12-03 PROBLEM — Z91.81 HISTORY OF FALL: Status: ACTIVE | Noted: 2024-12-03

## 2024-12-03 LAB
AMPHET+METHAMPHET UR QL: NEGATIVE
AMPHETAMINES UR QL: NEGATIVE
BARBITURATES UR QL SCN: NEGATIVE
BENZODIAZ UR QL SCN: NEGATIVE
BUPRENORPHINE SERPL-MCNC: NEGATIVE NG/ML
CANNABINOIDS SERPL QL: NEGATIVE
COCAINE UR QL: NEGATIVE
EXPIRATION DATE: NORMAL
Lab: NORMAL
MDMA UR QL SCN: NEGATIVE
METHADONE UR QL SCN: NEGATIVE
MORPHINE/OPIATES SCREEN, URINE: NEGATIVE
OXYCODONE UR QL SCN: NEGATIVE
PCP UR QL SCN: NEGATIVE

## 2024-12-03 PROCEDURE — 80305 DRUG TEST PRSMV DIR OPT OBS: CPT | Performed by: FAMILY MEDICINE

## 2024-12-03 NOTE — ASSESSMENT & PLAN NOTE
Discussed fall precautions.  Discussed options for lifeline type device or at least having cell phone nearby.

## 2024-12-16 ENCOUNTER — TELEPHONE (OUTPATIENT)
Dept: FAMILY MEDICINE CLINIC | Age: 84
End: 2024-12-16
Payer: MEDICARE

## 2024-12-16 NOTE — TELEPHONE ENCOUNTER
"  Caller: Radha Arana \"Shayy\"    Relationship: Self    Best call back number: [phone]     What is the best time to reach you: ANYTIME     Who are you requesting to speak with (clinical staff, provider,  specific staff member): CLINICAL     What was the call regarding: PATIENT DAUGHTER IS CALLING IN REGARDS TO REFERRAL AND ONCOLOGY PROVIDER, AND WANTS TO EXPRESS TO PCP THAT SHE IS NOT GOING TO CONTINUE WITH REFERRAL.   "

## 2024-12-17 NOTE — TELEPHONE ENCOUNTER
Spoke to daughter and she says that her mother has decided not to return to the hematologist because her labs are basically what they were when she saw Dr. Sanchez in the past and he told her two times that she didn't need to return to see him.

## 2024-12-24 DIAGNOSIS — K20.90 ESOPHAGITIS: ICD-10-CM

## 2024-12-26 RX ORDER — FUROSEMIDE 40 MG/1
TABLET ORAL
Qty: 90 TABLET | Refills: 1 | Status: SHIPPED | OUTPATIENT
Start: 2024-12-26

## 2024-12-26 RX ORDER — PANTOPRAZOLE SODIUM 40 MG/1
40 TABLET, DELAYED RELEASE ORAL 2 TIMES DAILY
Qty: 60 TABLET | Refills: 1 | Status: SHIPPED | OUTPATIENT
Start: 2024-12-26

## 2025-01-13 DIAGNOSIS — R06.2 WHEEZE: ICD-10-CM

## 2025-01-14 DIAGNOSIS — R06.2 WHEEZE: ICD-10-CM

## 2025-01-14 RX ORDER — ALBUTEROL SULFATE 90 UG/1
2 INHALANT RESPIRATORY (INHALATION) EVERY 4 HOURS PRN
Qty: 18 G | Refills: 0 | Status: SHIPPED | OUTPATIENT
Start: 2025-01-14

## 2025-01-14 RX ORDER — IPRATROPIUM BROMIDE AND ALBUTEROL SULFATE 2.5; .5 MG/3ML; MG/3ML
SOLUTION RESPIRATORY (INHALATION)
Qty: 360 ML | Refills: 1 | Status: SHIPPED | OUTPATIENT
Start: 2025-01-14

## 2025-01-14 NOTE — TELEPHONE ENCOUNTER
"    Caller: Radha Arana \"Shayy\"    Relationship: Self    Best call back number: [phone]    Requested Prescriptions:   Requested Prescriptions     Pending Prescriptions Disp Refills    albuterol sulfate  (90 Base) MCG/ACT inhaler 18 g 0     Sig: Inhale 2 puffs Every 4 (Four) Hours As Needed for Wheezing or Shortness of Air.        Pharmacy where request should be sent: [address] [place] [facility] [phone] [facility] [phone] FX     Last office visit with prescribing clinician: 12/2/2024   Last telemedicine visit with prescribing clinician: Visit date not found   Next office visit with prescribing clinician: 6/9/2025     Additional details provided by patient: PATIENT CALLED IN AND IS REQUESTING A CALL BACK REGARDING IRON LEVELS AND WOULD LIKE TO CHECK ON STATUS OF ONE TOUCH MACHINE PRESCRIPTION    Does the patient have less than a 3 day supply:  [] Yes  [] No    Would you like a call back once the refill request has been completed: [x] Yes [] No    If the office needs to give you a call back, can they leave a voicemail: [] Yes [] No    Anton Torres Rep   01/14/25 11:19 EST           "

## 2025-01-15 ENCOUNTER — TELEPHONE (OUTPATIENT)
Dept: FAMILY MEDICINE CLINIC | Age: 85
End: 2025-01-15
Payer: MEDICARE

## 2025-01-15 NOTE — TELEPHONE ENCOUNTER
"  Caller: Radha Arana \"Shayy\"    Relationship: Self    Best call back number: [phone]     What is the best time to reach you: ANY     Who are you requesting to speak with (clinical staff, provider,  specific staff member): CLINICAL       What was the call regarding: CALLER STATED THAT SHE IS NEEDING TO DISCUSS A KIT THAT SHE WAS INFORMED ABOUT IN DECEMBER 2024 THAT WOULD CHECK HER SUGAR LEVELS AS WELL AS ANY NEED FOR IRON SUPPLEMENT BASED ON RECENT LAB RESULTS.     Is it okay if the provider responds through MyChart: NO    "

## 2025-01-20 DIAGNOSIS — E11.9 TYPE 2 DIABETES MELLITUS WITHOUT COMPLICATION, WITHOUT LONG-TERM CURRENT USE OF INSULIN: ICD-10-CM

## 2025-01-20 DIAGNOSIS — R19.7 DIARRHEA, UNSPECIFIED TYPE: ICD-10-CM

## 2025-01-20 DIAGNOSIS — M48.00 SPINAL STENOSIS, UNSPECIFIED SPINAL REGION: ICD-10-CM

## 2025-01-20 RX ORDER — LANCETS
1 EACH MISCELLANEOUS DAILY
Qty: 100 EACH | Refills: 0 | Status: SHIPPED | OUTPATIENT
Start: 2025-01-20

## 2025-01-20 RX ORDER — COLESTIPOL HYDROCHLORIDE 1 G/1
1 TABLET ORAL 2 TIMES DAILY
Qty: 180 TABLET | Refills: 0 | Status: SHIPPED | OUTPATIENT
Start: 2025-01-20

## 2025-01-20 RX ORDER — BLOOD-GLUCOSE METER
1 KIT MISCELLANEOUS DAILY
Qty: 50 EACH | Refills: 1 | Status: SHIPPED | OUTPATIENT
Start: 2025-01-20

## 2025-01-20 RX ORDER — GABAPENTIN 300 MG/1
300 CAPSULE ORAL
Qty: 30 CAPSULE | Refills: 1 | Status: SHIPPED | OUTPATIENT
Start: 2025-01-20

## 2025-01-20 RX ORDER — DULOXETIN HYDROCHLORIDE 60 MG/1
CAPSULE, DELAYED RELEASE ORAL
Qty: 90 CAPSULE | Refills: 0 | Status: SHIPPED | OUTPATIENT
Start: 2025-01-20

## 2025-01-20 RX ORDER — BLOOD SUGAR DIAGNOSTIC
1 STRIP MISCELLANEOUS DAILY
Qty: 100 EACH | Refills: 2 | Status: SHIPPED | OUTPATIENT
Start: 2025-01-20

## 2025-01-20 RX ORDER — OXYBUTYNIN CHLORIDE 15 MG/1
TABLET, EXTENDED RELEASE ORAL
Qty: 90 TABLET | Refills: 0 | Status: SHIPPED | OUTPATIENT
Start: 2025-01-20

## 2025-01-23 DIAGNOSIS — D50.9 IRON DEFICIENCY ANEMIA, UNSPECIFIED IRON DEFICIENCY ANEMIA TYPE: Primary | ICD-10-CM

## 2025-01-23 NOTE — TELEPHONE ENCOUNTER
Spoke to Honey, she states that pt was on otc iron but had been off of it for several months. She is asking if you would consider sending in a prescription for iron so that she can get it put in her box that [facility] packs for her and it will be paid for by her insurance.  She says then if you will let them check it in a couple of months to see if her levels are better and if not, they will go to the hematologist at that time.

## 2025-02-03 DIAGNOSIS — M81.0 AGE-RELATED OSTEOPOROSIS WITHOUT CURRENT PATHOLOGICAL FRACTURE: ICD-10-CM

## 2025-02-03 RX ORDER — ALENDRONATE SODIUM 70 MG/1
TABLET ORAL
Qty: 12 TABLET | Refills: 0 | Status: SHIPPED | OUTPATIENT
Start: 2025-02-03

## 2025-02-03 NOTE — TELEPHONE ENCOUNTER
Last DEXA 12/2023   Telephone Encounter by Poppy Siddiqui RN at 05/31/18 10:07 AM     Author:  Poppy Siddiqui RN Service:  (none) Author Type:  Registered Nurse     Filed:  05/31/18 10:07 AM Encounter Date:  5/31/2018 Status:  Signed     :  Poppy Siddiqui RN (Registered Nurse)            Patient has a consultation 6/4 for liver ca  GI has been ordering paracentesis for patient  Patient requested Dr Mak be notified of below  Routed as FYI[KC1.1M]      Revision History        User Key Date/Time User Provider Type Action    > KC1.1 05/31/18 10:07 AM Poppy Siddiqui RN Registered Nurse Sign    M - Manual

## 2025-02-20 DIAGNOSIS — K20.90 ESOPHAGITIS: ICD-10-CM

## 2025-02-20 RX ORDER — PANTOPRAZOLE SODIUM 40 MG/1
40 TABLET, DELAYED RELEASE ORAL 2 TIMES DAILY
Qty: 60 TABLET | Refills: 4 | Status: SHIPPED | OUTPATIENT
Start: 2025-02-20

## 2025-02-27 ENCOUNTER — TELEPHONE (OUTPATIENT)
Dept: FAMILY MEDICINE CLINIC | Age: 85
End: 2025-02-27
Payer: MEDICARE

## 2025-02-27 NOTE — TELEPHONE ENCOUNTER
Caller: JOELLEN SCHMID (ON  [place])    Relationship: Emergency Contact    Best call back number: [phone]     What is the medical concern/diagnosis: FELL OUT OF CHAIR AND RIPPED LEFT LEG    What specialty or service is being requested: HOME HEALTH    What is the provider, practice or medical service name: [facility]    What is the office location:     What is the office phone number:     Any additional details: DAUGHTER TOOK PATIENT TO ER, THEY CLEANED IT AND SAID IT NEEDS TO BE CHANGED 2 TIMES A DAY    PLEASE CALL AND ADVISE

## 2025-02-28 ENCOUNTER — TELEPHONE (OUTPATIENT)
Dept: FAMILY MEDICINE CLINIC | Age: 85
End: 2025-02-28

## 2025-02-28 ENCOUNTER — READMISSION MANAGEMENT (OUTPATIENT)
Dept: CALL CENTER | Facility: HOSPITAL | Age: 85
End: 2025-02-28

## 2025-02-28 DIAGNOSIS — S81.802S WOUND OF LEFT LOWER EXTREMITY, SEQUELA: Primary | ICD-10-CM

## 2025-02-28 NOTE — TELEPHONE ENCOUNTER
I will place the referral.  Just double check with home health make sure the ER visit qualifies as the face-to-face.  If not can do a telehealth visit or she can come in.    [facility]  Yohan Harmon MD

## 2025-02-28 NOTE — OUTREACH NOTE
Prep Survey      Flowsheet Row Responses   [facility] facility patient discharged from? Non-BH   Is LACE score < 7 ? Non-BH Discharge   Eligibility [facility]   Hospital [facility]   Date of Discharge 02/28/25   Discharge diagnosis skin tear,   Does the patient have one of the following disease processes/diagnoses(primary or secondary)? Other   Prep survey completed? Yes            Izabel Rucker Registered Nurse

## 2025-03-01 ENCOUNTER — TRANSITIONAL CARE MANAGEMENT TELEPHONE ENCOUNTER (OUTPATIENT)
Dept: CALL CENTER | Facility: HOSPITAL | Age: 85
End: 2025-03-01

## 2025-03-01 NOTE — OUTREACH NOTE
Call Center TCM Note      Flowsheet Row Responses   [facility] patient discharged from? Non-  [[place]]   Does the patient have one of the following disease processes/diagnoses(primary or secondary)? Other   TCM attempt successful? Yes   Call start time 1355   Call end time 1402   Discharge diagnosis skin tear,   Meds reviewed with patient/caregiver? Yes   Is the patient having any side effects they believe may be caused by any medication additions or changes? No   Does the patient have all medications ordered at discharge? Yes   Is the patient taking all medications as directed (includes completed medication regime)? Yes   Comments Hospital d/c follow up 3/10/25@1000.   Does the patient have an appointment with their PCP within 7-14 days of discharge? Yes   Has home health visited the patient within 72 hours of discharge? N/A   Psychosocial issues? No   Did the patient receive a copy of their discharge instructions? Yes   Nursing interventions Reviewed instructions with patient   What is the patient's perception of their health status since discharge? Same   Is the patient/caregiver able to teach back signs and symptoms related to disease process for when to call PCP? Yes   Is the patient/caregiver able to teach back signs and symptoms related to disease process for when to call 911? Yes   Is the patient/caregiver able to teach back the hierarchy of who to call/visit for symptoms/problems? PCP, Specialist, Home health nurse, Urgent Care, ED, 911 Yes   If the patient is a current smoker, are they able to teach back resources for cessation? Not a smoker   TCM call completed? Yes   Wrap up additional comments Patient states arm is still bleeding some, Dtr doing dressing changes.   Call end time 1402   Would this patient benefit from a Referral to [facility] Social Work? No   Is the patient interested in additional calls from an ambulatory ? No            La Roman RN    3/1/2025, 14:03 EST

## 2025-03-14 DIAGNOSIS — M81.0 AGE-RELATED OSTEOPOROSIS WITHOUT CURRENT PATHOLOGICAL FRACTURE: ICD-10-CM

## 2025-03-17 ENCOUNTER — OFFICE VISIT (OUTPATIENT)
Dept: FAMILY MEDICINE CLINIC | Age: 85
End: 2025-03-17
Payer: MEDICARE

## 2025-03-17 VITALS
HEIGHT: 59 IN | WEIGHT: 177.8 LBS | OXYGEN SATURATION: 100 % | SYSTOLIC BLOOD PRESSURE: 114 MMHG | TEMPERATURE: 98.4 F | BODY MASS INDEX: 35.84 KG/M2 | HEART RATE: 85 BPM | DIASTOLIC BLOOD PRESSURE: 44 MMHG

## 2025-03-17 DIAGNOSIS — E78.2 MIXED HYPERLIPIDEMIA: ICD-10-CM

## 2025-03-17 DIAGNOSIS — D50.9 IRON DEFICIENCY ANEMIA, UNSPECIFIED IRON DEFICIENCY ANEMIA TYPE: ICD-10-CM

## 2025-03-17 DIAGNOSIS — E11.9 TYPE 2 DIABETES MELLITUS WITHOUT COMPLICATION, WITHOUT LONG-TERM CURRENT USE OF INSULIN: ICD-10-CM

## 2025-03-17 DIAGNOSIS — E03.9 ACQUIRED HYPOTHYROIDISM: ICD-10-CM

## 2025-03-17 DIAGNOSIS — S81.812S SKIN TEAR OF LOWER LEG WITHOUT COMPLICATION, LEFT, SEQUELA: Primary | ICD-10-CM

## 2025-03-17 DIAGNOSIS — Z91.81 HISTORY OF FALL: ICD-10-CM

## 2025-03-17 PROCEDURE — 99214 OFFICE O/P EST MOD 30 MIN: CPT | Performed by: FAMILY MEDICINE

## 2025-03-17 PROCEDURE — 1126F AMNT PAIN NOTED NONE PRSNT: CPT | Performed by: FAMILY MEDICINE

## 2025-03-17 PROCEDURE — G2211 COMPLEX E/M VISIT ADD ON: HCPCS | Performed by: FAMILY MEDICINE

## 2025-03-17 RX ORDER — ALENDRONATE SODIUM 70 MG/1
TABLET ORAL
Qty: 12 TABLET | Refills: 0 | OUTPATIENT
Start: 2025-03-17

## 2025-03-17 NOTE — ASSESSMENT & PLAN NOTE
Finally quit bleeding but does have some ongoing anemia.  Will get follow-up labs on the anemia next month.  Keep appointment with wound management today.  
Repeat labs when she returns for blood work next month  
She will return in 1 month will get follow-up labs.    
We discussed fall precautions and trying to learn lessons from her experience.  Does not currently have a lifeline which we discussed.  Her son generally is able to check on her frequently through the day.  
No

## 2025-03-17 NOTE — PROGRESS NOTES
Chief Complaint  Hospital Follow Up Visit (2/27-2/28 [facility] Bleeding from wound )    Subjective          Radha Arana presents to [facility]  History of Present Illness  Accompanied to the visit by her daughter Honey    Had been seen in the emergency room February 26 with a tear on her leg.  Had significant bleeding due to Eliquis and Plavix.  Returned home experience more bleeding presented back to the hospital on February 27 at which time she was observed overnight.  Decision was made to withhold her anticoagulants for 5 days and was given wound care instructions.      Lab work during hospitalization did show her to be anemic with hemoglobin 9.3 and her iron saturation 7%.  At her December 2 lab work hemoglobin was 13.0 but her iron was already low at 8%.  Continued to have a little bit of bleeding after returning home from the hospital.  Bleeding finally quit on Monday, 3 March.    She is also being followed by wound care.  She has an appointment with them later today.      Current Outpatient Medications on File Prior to Visit   Medication Sig Dispense Refill    albuterol sulfate  (90 Base) MCG/ACT inhaler Inhale 2 puffs Every 4 (Four) Hours As Needed for Wheezing or Shortness of Air. 18 g 0    alendronate (FOSAMAX) 70 MG tablet TAKE 1 TABLET BY MOUTH ONCE WEEKLY IN THE MORNING. TAKE 30 MINUTES BEFORE FOOD, BEVERAGE, OR MEDICATIONS. STAY UPRIGHT FOR 30 MINUTES. 12 tablet 0    atorvastatin (LIPITOR) 80 MG tablet       clopidogrel (PLAVIX) 75 MG tablet Take 1 tablet by mouth Daily.      colestipol (COLESTID) 1 g tablet TAKE ONE TABLET BY MOUTH TWICE DAILY 180 tablet 0    DULoxetine (CYMBALTA) 60 MG capsule TAKE ONE CAPSULE BY MOUTH DAILY 90 capsule 0    Eliquis 5 MG tablet tablet 1 tablet 2 (Two) Times a Day.      furosemide (LASIX) 40 MG tablet TAKE ONE TABLET BY MOUTH EVERY DAY 90 tablet 1    gabapentin (NEURONTIN) 300 MG capsule TAKE ONE CAPSULE BY MOUTH EVERY NIGHT AT  "BEDTIME 30 capsule 1    glucose blood (OneTouch Ultra) test strip 1 each by Other route Daily. CAN USE ANY COVERED BRAND DX E11.9 100 each 2    glucose monitor monitoring kit Use 1 each Daily. CAN USE ANY COVERED BRAND DX E11.9 50 each 1    guaiFENesin (MUCINEX) 600 MG 12 hr tablet Take 2 tablets by mouth Daily.      ipratropium-albuterol (DUO-NEB) 0.5-2.5 mg/3 ml nebulizer INHALE THE CONTENTS OF 1 VIAL VIA NEBULIZER EVERY 4 HOURS AS NEEDED FOR WHEEZING OR SHORTNESS OF BREATH 360 mL 1    Iron, Ferrous Sulfate, 325 (65 Fe) MG tablet Take 325 mg by mouth Every Morning Before Breakfast. Take along with OJ or Vit C tablet. 100 tablet 0    Lancets Thin misc Use 1 each Daily. CAN USE ANY COVERED BRAND  DXE11.9 100 each 0    levothyroxine (SYNTHROID, LEVOTHROID) 25 MCG tablet TAKE ONE TABLET BY MOUTH EVERY MORNING 90 tablet 1    lisinopril (PRINIVIL,ZESTRIL) 10 MG tablet TAKE ONE TABLET EVERY DAY 90 tablet 1    Melatonin 10 MG tablet Take 1 tablet by mouth Every Night.      multivitamin with minerals tablet tablet Take 1 tablet by mouth Daily.      nitroglycerin (Nitrostat) 0.4 MG SL tablet Place 1 tablet under the tongue Every 5 (Five) Minutes As Needed for Chest Pain. Take no more than 3 doses in 15 minutes. 25 tablet 1    O2 (OXYGEN) Inhale 1 (One) Time.      oxybutynin XL (DITROPAN XL) 15 MG 24 hr tablet TAKE ONE TABLET BY MOUTH DAILY 90 tablet 0    pantoprazole (PROTONIX) 40 MG EC tablet TAKE ONE TABLET BY MOUTH TWICE DAILY 60 tablet 4    potassium chloride (KLOR-CON M20) 20 MEQ CR tablet TAKE ONE TABLET BY MOUTH TWICE DAILY 180 tablet 1    Probiotic Product (PROBIOTIC DAILY PO) Take  by mouth.       No current facility-administered medications on file prior to visit.       Review of Systems         Objective   Vital Signs:   /44 (BP Location: Right arm, Patient Position: Sitting)   Pulse 85   Temp 98.4 °F (36.9 °C) (Oral)   Ht 149.9 cm (59.02\")   Wt 80.6 kg (177 lb 12.8 oz)   SpO2 100%   BMI 35.89 kg/m²   "   Physical Exam     No distress  Color is good  Eyes are nonicteric  Speech is fluent  Heart regular rate and rhythm  Lungs with few upper airway coarse crackles no wheeze good air movement  Abdomen soft nontender  Left leg wrapped in a dressing not inspected deferred to wound management    Result Review :                     Assessment and Plan    Diagnoses and all orders for this visit:    1. Skin tear of lower leg without complication, left, sequela (Primary)  Assessment & Plan:  Finally quit bleeding but does have some ongoing anemia.  Will get follow-up labs on the anemia next month.  Keep appointment with wound management today.      2. History of fall  Assessment & Plan:  We discussed fall precautions and trying to learn lessons from her experience.  Does not currently have a lifeline which we discussed.  Her son generally is able to check on her frequently through the day.      3. Iron deficiency anemia, unspecified iron deficiency anemia type  -     Iron Profile; Future  -     Ferritin; Future  -     CBC & Differential; Future  -     Reticulocytes; Future  -     Vitamin B12; Future  -     Folate; Future  -     Microalbumin / Creatinine Urine Ratio - Urine, Clean Catch; Future    4. Type 2 diabetes mellitus without complication, without long-term current use of insulin  Assessment & Plan:  She will return in 1 month will get follow-up labs.      Orders:  -     Microalbumin / Creatinine Urine Ratio - Urine, Clean Catch; Future  -     Hemoglobin A1c; Future    5. Acquired hypothyroidism  Assessment & Plan:  Repeat labs when she returns for blood work next month    Orders:  -     TSH Rfx On Abnormal To Free T4; Future    6. Mixed hyperlipidemia  -     Lipid Panel; Future        Follow Up   No follow-ups on file.  Patient was given instructions and counseling regarding her condition or for health maintenance advice. Please see specific information pulled into the AVS if appropriate.

## 2025-03-28 DIAGNOSIS — M48.00 SPINAL STENOSIS, UNSPECIFIED SPINAL REGION: ICD-10-CM

## 2025-03-28 RX ORDER — GABAPENTIN 300 MG/1
300 CAPSULE ORAL
Qty: 30 CAPSULE | Refills: 1 | Status: SHIPPED | OUTPATIENT
Start: 2025-03-28

## 2025-03-28 NOTE — TELEPHONE ENCOUNTER
Controlled refill request:  Requested Prescriptions     Pending Prescriptions Disp Refills    gabapentin (NEURONTIN) 300 MG capsule 30 capsule 1     Sig: Take 1 capsule by mouth every night at bedtime.      Last OV:  3/17/2025  Next OV:  6/9/2025  Last fill:  1/20/25 x1  Last tox:  12/3/24

## 2025-04-07 ENCOUNTER — LAB (OUTPATIENT)
Dept: LAB | Facility: HOSPITAL | Age: 85
End: 2025-04-07
Payer: MEDICARE

## 2025-04-07 DIAGNOSIS — E11.9 TYPE 2 DIABETES MELLITUS WITHOUT COMPLICATION, WITHOUT LONG-TERM CURRENT USE OF INSULIN: ICD-10-CM

## 2025-04-07 DIAGNOSIS — D50.9 IRON DEFICIENCY ANEMIA, UNSPECIFIED IRON DEFICIENCY ANEMIA TYPE: ICD-10-CM

## 2025-04-07 DIAGNOSIS — E78.2 MIXED HYPERLIPIDEMIA: ICD-10-CM

## 2025-04-07 DIAGNOSIS — E03.9 ACQUIRED HYPOTHYROIDISM: ICD-10-CM

## 2025-04-07 LAB
ALBUMIN UR-MCNC: <1.2 MG/DL
BASOPHILS # BLD AUTO: 0.07 10*3/MM3 (ref 0–0.2)
BASOPHILS NFR BLD AUTO: 0.8 % (ref 0–1.5)
CHOLEST SERPL-MCNC: 120 MG/DL (ref 0–200)
CREAT UR-MCNC: 37.1 MG/DL
DEPRECATED RDW RBC AUTO: 47 FL (ref 37–54)
EOSINOPHIL # BLD AUTO: 0.44 10*3/MM3 (ref 0–0.4)
EOSINOPHIL NFR BLD AUTO: 5.2 % (ref 0.3–6.2)
ERYTHROCYTE [DISTWIDTH] IN BLOOD BY AUTOMATED COUNT: 13.7 % (ref 12.3–15.4)
FERRITIN SERPL-MCNC: 34.6 NG/ML (ref 13–150)
FOLATE SERPL-MCNC: >20 NG/ML (ref 4.78–24.2)
HBA1C MFR BLD: 5.8 % (ref 4.8–5.6)
HCT VFR BLD AUTO: 36.8 % (ref 34–46.6)
HDLC SERPL-MCNC: 52 MG/DL (ref 40–60)
HGB BLD-MCNC: 10.8 G/DL (ref 12–15.9)
IMM GRANULOCYTES # BLD AUTO: 0.01 10*3/MM3 (ref 0–0.05)
IMM GRANULOCYTES NFR BLD AUTO: 0.1 % (ref 0–0.5)
IRON 24H UR-MRATE: 112 MCG/DL (ref 37–145)
IRON SATN MFR SERPL: 22 % (ref 20–50)
LDLC SERPL CALC-MCNC: 49 MG/DL (ref 0–100)
LDLC/HDLC SERPL: 0.91 {RATIO}
LYMPHOCYTES # BLD AUTO: 1.98 10*3/MM3 (ref 0.7–3.1)
LYMPHOCYTES NFR BLD AUTO: 23.5 % (ref 19.6–45.3)
MCH RBC QN AUTO: 27.1 PG (ref 26.6–33)
MCHC RBC AUTO-ENTMCNC: 29.3 G/DL (ref 31.5–35.7)
MCV RBC AUTO: 92.5 FL (ref 79–97)
MICROALBUMIN/CREAT UR: NORMAL MG/G{CREAT}
MONOCYTES # BLD AUTO: 0.82 10*3/MM3 (ref 0.1–0.9)
MONOCYTES NFR BLD AUTO: 9.7 % (ref 5–12)
NEUTROPHILS NFR BLD AUTO: 5.1 10*3/MM3 (ref 1.7–7)
NEUTROPHILS NFR BLD AUTO: 60.7 % (ref 42.7–76)
PLATELET # BLD AUTO: 393 10*3/MM3 (ref 140–450)
PMV BLD AUTO: 8.6 FL (ref 6–12)
RBC # BLD AUTO: 3.98 10*6/MM3 (ref 3.77–5.28)
RETICS # AUTO: 0.14 10*6/MM3 (ref 0.02–0.13)
RETICS/RBC NFR AUTO: 2.24 % (ref 0.7–1.9)
TIBC SERPL-MCNC: 501 MCG/DL (ref 298–536)
TRANSFERRIN SERPL-MCNC: 336 MG/DL (ref 200–360)
TRIGL SERPL-MCNC: 103 MG/DL (ref 0–150)
TSH SERPL DL<=0.05 MIU/L-ACNC: 1.28 UIU/ML (ref 0.27–4.2)
VIT B12 BLD-MCNC: 730 PG/ML (ref 211–946)
VLDLC SERPL-MCNC: 19 MG/DL (ref 5–40)
WBC NRBC COR # BLD AUTO: 8.42 10*3/MM3 (ref 3.4–10.8)

## 2025-04-07 PROCEDURE — 82043 UR ALBUMIN QUANTITATIVE: CPT

## 2025-04-07 PROCEDURE — 36415 COLL VENOUS BLD VENIPUNCTURE: CPT

## 2025-04-07 PROCEDURE — 83036 HEMOGLOBIN GLYCOSYLATED A1C: CPT

## 2025-04-07 PROCEDURE — 82728 ASSAY OF FERRITIN: CPT

## 2025-04-07 PROCEDURE — 82607 VITAMIN B-12: CPT

## 2025-04-07 PROCEDURE — 83540 ASSAY OF IRON: CPT

## 2025-04-07 PROCEDURE — 84466 ASSAY OF TRANSFERRIN: CPT

## 2025-04-07 PROCEDURE — 85045 AUTOMATED RETICULOCYTE COUNT: CPT

## 2025-04-07 PROCEDURE — 85025 COMPLETE CBC W/AUTO DIFF WBC: CPT

## 2025-04-07 PROCEDURE — 84443 ASSAY THYROID STIM HORMONE: CPT

## 2025-04-07 PROCEDURE — 80061 LIPID PANEL: CPT

## 2025-04-07 PROCEDURE — 82570 ASSAY OF URINE CREATININE: CPT

## 2025-04-07 PROCEDURE — 82746 ASSAY OF FOLIC ACID SERUM: CPT

## 2025-04-29 DIAGNOSIS — R19.7 DIARRHEA, UNSPECIFIED TYPE: ICD-10-CM

## 2025-04-29 DIAGNOSIS — M48.00 SPINAL STENOSIS, UNSPECIFIED SPINAL REGION: ICD-10-CM

## 2025-04-29 RX ORDER — GABAPENTIN 300 MG/1
300 CAPSULE ORAL
Qty: 30 CAPSULE | Refills: 0 | OUTPATIENT
Start: 2025-04-29

## 2025-04-29 RX ORDER — DULOXETIN HYDROCHLORIDE 60 MG/1
60 CAPSULE, DELAYED RELEASE ORAL DAILY
Qty: 90 CAPSULE | Refills: 0 | Status: SHIPPED | OUTPATIENT
Start: 2025-04-29

## 2025-04-29 RX ORDER — COLESTIPOL HYDROCHLORIDE 1 G/1
1 TABLET ORAL 2 TIMES DAILY
Qty: 180 TABLET | Refills: 0 | Status: SHIPPED | OUTPATIENT
Start: 2025-04-29

## 2025-04-29 RX ORDER — OXYBUTYNIN CHLORIDE 15 MG/1
15 TABLET, EXTENDED RELEASE ORAL DAILY
Qty: 90 TABLET | Refills: 0 | Status: SHIPPED | OUTPATIENT
Start: 2025-04-29

## 2025-05-05 DIAGNOSIS — M81.0 AGE-RELATED OSTEOPOROSIS WITHOUT CURRENT PATHOLOGICAL FRACTURE: ICD-10-CM

## 2025-05-05 RX ORDER — ALENDRONATE SODIUM 70 MG/1
TABLET ORAL
Qty: 12 TABLET | Refills: 0 | Status: SHIPPED | OUTPATIENT
Start: 2025-05-05

## 2025-05-27 DIAGNOSIS — E87.6 HYPOKALEMIA: ICD-10-CM

## 2025-05-27 DIAGNOSIS — D50.9 IRON DEFICIENCY ANEMIA, UNSPECIFIED IRON DEFICIENCY ANEMIA TYPE: ICD-10-CM

## 2025-05-27 DIAGNOSIS — M48.00 SPINAL STENOSIS, UNSPECIFIED SPINAL REGION: ICD-10-CM

## 2025-05-28 RX ORDER — LEVOTHYROXINE SODIUM 25 UG/1
25 TABLET ORAL EVERY MORNING
Qty: 90 TABLET | Refills: 1 | Status: SHIPPED | OUTPATIENT
Start: 2025-05-28

## 2025-05-28 RX ORDER — FERROUS SULFATE 325(65) MG
TABLET ORAL
Qty: 100 TABLET | Refills: 0 | Status: SHIPPED | OUTPATIENT
Start: 2025-05-28

## 2025-05-28 RX ORDER — GABAPENTIN 300 MG/1
300 CAPSULE ORAL
Qty: 30 CAPSULE | Refills: 1 | Status: SHIPPED | OUTPATIENT
Start: 2025-05-28

## 2025-05-28 RX ORDER — POTASSIUM CHLORIDE 1500 MG/1
20 TABLET, EXTENDED RELEASE ORAL EVERY 12 HOURS SCHEDULED
Qty: 180 TABLET | Refills: 1 | Status: SHIPPED | OUTPATIENT
Start: 2025-05-28

## 2025-05-28 RX ORDER — GABAPENTIN 300 MG/1
300 CAPSULE ORAL
Qty: 30 CAPSULE | Refills: 1 | OUTPATIENT
Start: 2025-05-28

## 2025-05-28 NOTE — TELEPHONE ENCOUNTER
Controlled refill request:  Requested Prescriptions     Pending Prescriptions Disp Refills    gabapentin (NEURONTIN) 300 MG capsule [Pharmacy Med Name: gabapentin 300 mg capsule] 30 capsule 1     Sig: TAKE ONE CAPSULE BY MOUTH EVERY NIGHT AT BEDTIME      Last OV:  3/17/2025  Next OV:  6/9/2025  Last fill:  #30 x1 LF 3/28/25  Last tox:  12/3/24

## 2025-06-09 ENCOUNTER — OFFICE VISIT (OUTPATIENT)
Dept: FAMILY MEDICINE CLINIC | Age: 85
End: 2025-06-09
Payer: MEDICARE

## 2025-06-09 VITALS
DIASTOLIC BLOOD PRESSURE: 50 MMHG | HEIGHT: 59 IN | TEMPERATURE: 97.2 F | SYSTOLIC BLOOD PRESSURE: 114 MMHG | OXYGEN SATURATION: 99 % | BODY MASS INDEX: 35.6 KG/M2 | WEIGHT: 176.6 LBS | HEART RATE: 81 BPM

## 2025-06-09 DIAGNOSIS — I25.10 CORONARY ARTERY DISEASE INVOLVING NATIVE CORONARY ARTERY OF NATIVE HEART WITHOUT ANGINA PECTORIS: ICD-10-CM

## 2025-06-09 DIAGNOSIS — M79.3 PANNICULITIS: Primary | ICD-10-CM

## 2025-06-09 DIAGNOSIS — E03.9 ACQUIRED HYPOTHYROIDISM: ICD-10-CM

## 2025-06-09 DIAGNOSIS — E11.9 TYPE 2 DIABETES MELLITUS WITHOUT COMPLICATION, WITHOUT LONG-TERM CURRENT USE OF INSULIN: ICD-10-CM

## 2025-06-09 DIAGNOSIS — I48.0 PAROXYSMAL ATRIAL FIBRILLATION: ICD-10-CM

## 2025-06-09 DIAGNOSIS — J44.9 CHRONIC OBSTRUCTIVE PULMONARY DISEASE, UNSPECIFIED COPD TYPE: ICD-10-CM

## 2025-06-09 LAB
EXPIRATION DATE: ABNORMAL
HBA1C MFR BLD: 7 % (ref 4.5–5.7)
Lab: ABNORMAL

## 2025-06-09 RX ORDER — TIRZEPATIDE 2.5 MG/.5ML
2.5 INJECTION, SOLUTION SUBCUTANEOUS WEEKLY
Qty: 2 ML | Refills: 2 | Status: SHIPPED | OUTPATIENT
Start: 2025-06-09

## 2025-06-09 RX ORDER — NYSTATIN 100000 [USP'U]/G
POWDER TOPICAL 4 TIMES DAILY
Qty: 120 G | Refills: 1 | Status: SHIPPED | OUTPATIENT
Start: 2025-06-09

## 2025-06-09 RX ORDER — PHENOL 1.4 %
600 AEROSOL, SPRAY (ML) MUCOUS MEMBRANE DAILY
COMMUNITY

## 2025-06-09 RX ORDER — MAGNESIUM OXIDE 400 MG/1
400 TABLET ORAL DAILY
COMMUNITY

## 2025-06-09 RX ORDER — FLUCONAZOLE 100 MG/1
100 TABLET ORAL DAILY
Qty: 5 TABLET | Refills: 0 | Status: SHIPPED | OUTPATIENT
Start: 2025-06-09

## 2025-06-09 NOTE — ASSESSMENT & PLAN NOTE
Will have her start using nystatin powder and give her a few days of Diflucan.  Hold off on using her moisturizer in the short-term.  Orders:    nystatin (MYCOSTATIN) 185866 UNIT/GM powder; Apply  topically to the appropriate area as directed 4 (Four) Times a Day.    fluconazole (Diflucan) 100 MG tablet; Take 1 tablet by mouth Daily.

## 2025-06-09 NOTE — ASSESSMENT & PLAN NOTE
Hemoglobin A1c increased to 7.0.  She is interested in trying to get that under better control with use of a GLP-1 in hopes that it would also be beneficial for weight control.  Has concerns about potential cost.  We will send in a prescription and they can find out what the expense would be.    Orders:    POC Glycosylated Hemoglobin (Hb A1C)    Tirzepatide (Mounjaro) 2.5 MG/0.5ML solution auto-injector; Inject 2.5 mg under the skin into the appropriate area as directed 1 (One) Time Per Week.

## 2025-06-09 NOTE — ASSESSMENT & PLAN NOTE
Continued with inhaled medications and supplemental oxygen.  Her walk test demonstrates continued need for oxygen supplementation with pulse oximetry dropping to 85% with ambulation.

## 2025-06-09 NOTE — PROGRESS NOTES
Chief Complaint  Diabetes, Hypertension, and Hypothyroidism    Patient or patient representative verbalized consent for the use of Ambient Listening during the visit with  Rell Harmon MD for chart documentation. 6/9/2025  10:58 EDT    Subjective          Radha Arana presents to [facility]  History of Present Illness  Accompanied to the visit by her daughter, Honey  History of Present Illness  The patient is an 85-year-old female who was last seen in 03/2025 for a lower extremity skin tear that required hospitalization due to vigorous bleeding, as she had been on anticoagulants. She had a visit with orthopedics on 04/24/2025 and received steroid injections into her knee and wrist. In 05/2025, she received a steroid injection into the rotator cuff on the right side.    She reports that her leg wound has completely healed. However, she now presents with a new issue of bilateral groin area wounds that have reopened and are bleeding. She describes the area as warm and moist, with blood noted in her underwear this morning. Her daughter observed severe redness in the area yesterday, which has since worsened. She has been applying Lantiseptic cream more frequently but has not noticed any yeast-like odor. She has not started any new medications except for an iron pill. She reports no cardiac symptoms such as chest pain, tachycardia, or palpitations. She has observed black stools since starting the iron pill but reports no presence of blood in her stool. She uses a rollator for mobility and brought it with her today. She has been using Lantiseptic cream for years.    She does not monitor her blood glucose levels at home due to the lack of a meter. Her diet remains unchanged, and she does not consume sugar. She is not currently on any antidiabetic medication, having discontinued metformin due to loose stools. She expresses a desire to lose weight and engages in physical activity by  walking three laps around her house several times a day.    She uses supplemental oxygen at home, set at 2 liters, and has a device to measure her oxygen saturation, which typically reads in the 90s. She is not currently using nebulized breathing treatments as she does not feel the need for them and has not been producing any sputum.    Her blood pressure readings have been consistently in the 90s systolic and 60s to 70s diastolic. She maintains a daily log of her blood pressure readings, which she records using a wrist monitor. The highest reading she has recorded was 132 systolic last week.    Her appetite is normal. She has been taking fiber supplements and consuming rice and bran daily to ensure adequate fiber intake.            Current Outpatient Medications on File Prior to Visit   Medication Sig Dispense Refill    albuterol sulfate  (90 Base) MCG/ACT inhaler Inhale 2 puffs Every 4 (Four) Hours As Needed for Wheezing or Shortness of Air. 18 g 0    alendronate (FOSAMAX) 70 MG tablet TAKE 1 TABLET BY MOUTH ONCE WEEKLY IN THE MORNING. TAKE 30 MINUTES BEFORE FOOD, BEVERAGE, OR MEDICATIONS. STAY UPRIGHT FOR 30 MINUTES. 12 tablet 0    atorvastatin (LIPITOR) 80 MG tablet       calcium carbonate (OS-JOJO) 600 MG tablet Take 1 tablet by mouth Daily.      clopidogrel (PLAVIX) 75 MG tablet Take 1 tablet by mouth Daily.      colestipol (COLESTID) 1 g tablet TAKE ONE TABLET BY MOUTH TWICE DAILY 180 tablet 0    DULoxetine (CYMBALTA) 60 MG capsule TAKE ONE CAPSULE BY MOUTH EVERY DAY 90 capsule 0    Eliquis 5 MG tablet tablet 1 tablet 2 (Two) Times a Day.      FeroSul 325 (65 Fe) MG tablet TAKE ONE TABLET BY MOUTH EVERY MORNING BEFORE breakfast. take with ORANGE JUICE OR AS VITAMIN c TABLET 100 tablet 0    furosemide (LASIX) 40 MG tablet TAKE ONE TABLET BY MOUTH EVERY DAY 90 tablet 1    gabapentin (NEURONTIN) 300 MG capsule TAKE ONE CAPSULE BY MOUTH EVERY NIGHT AT BEDTIME 30 capsule 1    glucose blood (OneTouch Ultra)  "test strip 1 each by Other route Daily. CAN USE ANY COVERED BRAND DX E11.9 100 each 2    glucose monitor monitoring kit Use 1 each Daily. CAN USE ANY COVERED BRAND DX E11.9 50 each 1    guaiFENesin (MUCINEX) 600 MG 12 hr tablet Take 2 tablets by mouth Daily.      ipratropium-albuterol (DUO-NEB) 0.5-2.5 mg/3 ml nebulizer INHALE THE CONTENTS OF 1 VIAL VIA NEBULIZER EVERY 4 HOURS AS NEEDED FOR WHEEZING OR SHORTNESS OF BREATH 360 mL 1    Lancets Thin misc Use 1 each Daily. CAN USE ANY COVERED BRAND  DXE11.9 100 each 0    levothyroxine (SYNTHROID, LEVOTHROID) 25 MCG tablet TAKE ONE TABLET BY MOUTH EVERY MORNING 90 tablet 1    lisinopril (PRINIVIL,ZESTRIL) 10 MG tablet TAKE ONE TABLET EVERY DAY 90 tablet 1    magnesium oxide (MAG-OX) 400 MG tablet Take 1 tablet by mouth Daily.      Melatonin 10 MG tablet Take 1 tablet by mouth Every Night.      multivitamin with minerals tablet tablet Take 1 tablet by mouth Daily.      nitroglycerin (Nitrostat) 0.4 MG SL tablet Place 1 tablet under the tongue Every 5 (Five) Minutes As Needed for Chest Pain. Take no more than 3 doses in 15 minutes. 25 tablet 1    O2 (OXYGEN) Inhale 1 (One) Time.      oxybutynin XL (DITROPAN XL) 15 MG 24 hr tablet TAKE ONE TABLET BY MOUTH DAILY 90 tablet 0    pantoprazole (PROTONIX) 40 MG EC tablet TAKE ONE TABLET BY MOUTH TWICE DAILY 60 tablet 4    potassium chloride (KLOR-CON M20) 20 MEQ CR tablet TAKE ONE TABLET BY MOUTH TWICE DAILY 180 tablet 1    Probiotic Product (PROBIOTIC DAILY PO) Take  by mouth.       No current facility-administered medications on file prior to visit.       Review of Systems     Social History     Tobacco Use   Smoking Status Never   Smokeless Tobacco Never        Objective   Vital Signs:   /50 (BP Location: Left arm, Patient Position: Sitting)   Pulse 81   Temp 97.2 °F (36.2 °C) (Temporal)   Ht 149.9 cm (59.02\")   Wt 80.1 kg (176 lb 9.6 oz)   SpO2 96%   BMI 35.64 kg/m²   BP Readings from Last 3 Encounters:   06/09/25 " 114/50   03/17/25 114/44   12/02/24 125/62      Physical Exam   Seated in wheelchair  Supplemental oxygen in place  Was able to stand with assistance  Tympanic membrane's clear  Oropharynx clear  Heart regular rate rhythm 2/6 systolic murmur  Lungs with fair air movement no localized wheezes or crackles.  Inferior to the panniculus and in the perineum area there is erythema with some macerated tissue.  A linear skin tear possibly from a scratch oozing a little blood.  There is blood in the underwear.    Neurologic without gross lateralizing focal deficit  Trace pedal edema at the ankles  Globular knee joints  Physical Exam        Result Review :       Results  Labs   - Hemoglobin A1c: 04/2025, 5.8%   - Hemoglobin A1c: 7.0%    Diagnostic Testing   - Pulse oximetry with walk test: Oxygen saturation dropped to 85% with ambulation                  Assessment and Plan    Assessment & Plan  Panniculitis  Will have her start using nystatin powder and give her a few days of Diflucan.  Hold off on using her moisturizer in the short-term.  Orders:    nystatin (MYCOSTATIN) 350410 UNIT/GM powder; Apply  topically to the appropriate area as directed 4 (Four) Times a Day.    fluconazole (Diflucan) 100 MG tablet; Take 1 tablet by mouth Daily.    Type 2 diabetes mellitus without complication, without long-term current use of insulin  Hemoglobin A1c increased to 7.0.  She is interested in trying to get that under better control with use of a GLP-1 in hopes that it would also be beneficial for weight control.  Has concerns about potential cost.  We will send in a prescription and they can find out what the expense would be.    Orders:    POC Glycosylated Hemoglobin (Hb A1C)    Tirzepatide (Mounjaro) 2.5 MG/0.5ML solution auto-injector; Inject 2.5 mg under the skin into the appropriate area as directed 1 (One) Time Per Week.    Acquired hypothyroidism  Lab in April looked okay continue on current regimen       Paroxysmal atrial  fibrillation  Asymptomatic.  Anticoagulated.       Coronary artery disease involving native coronary artery of native heart without angina pectoris  No symptoms of ischemic heart disease.  Continue with risk factor reduction strategies         Chronic obstructive pulmonary disease, unspecified COPD type  Continued with inhaled medications and supplemental oxygen.  Her walk test demonstrates continued need for oxygen supplementation with pulse oximetry dropping to 85% with ambulation.             Assessment & Plan  1. Groin area skin tear.  - The patient's hemoglobin A1c level has increased to 7.0, which may predispose her to yeast infections.  - The use of Lantiseptic cream could potentially soften her skin excessively.  - A prescription for nystatin powder will be provided to apply to the affected area, ensuring it remains dry.  - Additionally, a few days' course of Diflucan will be administered to manage any potential yeast infection.    2. Diabetes Mellitus.  - Her hemoglobin A1c level is 7.0, which is the highest it has been in the past year.  - She has not been taking any medication for her blood sugars.  - A prescription for Mounjaro will be sent to her pharmacy to help control her blood sugar levels and assist with weight loss.  - She is advised to check the cost at the pharmacy and call back if it is too expensive. She is also advised to continue working on her diet.    3. Oxygen therapy.  - Her pulse oximetry dropped to 85% during a walk test.  - This information will be documented in her office note, which can be used to update her oxygen machine requirements.  - She uses 2 L of oxygen at home and 3 L when using long tubing.  - She is advised to bring her rollator for the walk test to check her oxygen levels during ambulation.    4. Blood pressure management.  - Her blood pressure readings have been consistently in the 90s systolic and 60s to 70s diastolic.  - She maintains a daily log of her blood  pressure readings, which she records using a wrist monitor.  - The highest reading she has recorded was 132 systolic last week.  - She is advised to bring her blood pressure machine to the clinic for validation against the clinic's equipment.    5. Health maintenance.  - She received a Tdap vaccine in 02/2025.        Follow Up   No follow-ups on file.  Patient was given instructions and counseling regarding her condition or for health maintenance advice. Please see specific information pulled into the AVS if appropriate.

## 2025-06-10 ENCOUNTER — TELEPHONE (OUTPATIENT)
Dept: FAMILY MEDICINE CLINIC | Age: 85
End: 2025-06-10
Payer: MEDICARE

## 2025-06-10 NOTE — TELEPHONE ENCOUNTER
"  Caller: Radha Arana \"Shayy\"    Relationship: Self    Best call back number: [phone]    Equipment requested: OXYGEN MACHINE     Reason for the request: CURRENT MACHINE IS NOT WORKING    Prescribing Provider: DR. CONTE     Additional information or concerns: PATIENT SPOKE WITH [facility] AND HAVE NO RECORD OF RECEIVING INFORMATION STATING THAT PATIENT NEEDS THE OXYGEN MACHINE   PLEASE SEND THE INFORMATION SO SHE CAN GET A REPLACEMENT   [facility] HAS NO RECORD OF THE MACHINE BEING RENTED FROM THEM SO THEY CANNOT COME AND CHECK TO SEE IF IT CAN BE REPAIRED WITHOUT CHARGING $150  PLEASE CONTACT PATIENT AND LET HER KNOW WHEN INFORMATION HAS BEEN SENT         " No

## 2025-06-11 ENCOUNTER — TELEPHONE (OUTPATIENT)
Dept: FAMILY MEDICINE CLINIC | Age: 85
End: 2025-06-11
Payer: MEDICARE

## 2025-06-11 NOTE — TELEPHONE ENCOUNTER
PATIENT IS CALLING REQUESTING TO SPEAK WITH THE NURSE FOR RE CERTIFICATION   FOR OXYGEN PATIENT HAS ALSO PROVIDED A FAX NUMBER [phone]

## 2025-06-19 ENCOUNTER — TELEPHONE (OUTPATIENT)
Dept: FAMILY MEDICINE CLINIC | Age: 85
End: 2025-06-19
Payer: MEDICARE

## 2025-06-19 DIAGNOSIS — S81.802S WOUND OF LEFT LOWER EXTREMITY, SEQUELA: ICD-10-CM

## 2025-06-19 DIAGNOSIS — S80.852S: Primary | ICD-10-CM

## 2025-06-19 NOTE — TELEPHONE ENCOUNTER
lisa from [facility] wound care said pt needs a referral for Left Lower Extremity -she was last seen them in April for the same thing the would has came open. Thanks.

## 2025-06-25 RX ORDER — FUROSEMIDE 40 MG/1
40 TABLET ORAL DAILY
Qty: 90 TABLET | Refills: 0 | Status: SHIPPED | OUTPATIENT
Start: 2025-06-25

## 2025-07-18 DIAGNOSIS — R19.7 DIARRHEA, UNSPECIFIED TYPE: ICD-10-CM

## 2025-07-18 DIAGNOSIS — M48.00 SPINAL STENOSIS, UNSPECIFIED SPINAL REGION: ICD-10-CM

## 2025-07-19 DIAGNOSIS — K20.90 ESOPHAGITIS: ICD-10-CM

## 2025-07-21 RX ORDER — OXYBUTYNIN CHLORIDE 15 MG/1
15 TABLET, EXTENDED RELEASE ORAL DAILY
Qty: 90 TABLET | Refills: 1 | Status: SHIPPED | OUTPATIENT
Start: 2025-07-21

## 2025-07-21 RX ORDER — GABAPENTIN 300 MG/1
300 CAPSULE ORAL
Qty: 90 CAPSULE | Refills: 0 | Status: SHIPPED | OUTPATIENT
Start: 2025-07-21

## 2025-07-21 RX ORDER — PANTOPRAZOLE SODIUM 40 MG/1
40 TABLET, DELAYED RELEASE ORAL 2 TIMES DAILY
Qty: 180 TABLET | Refills: 1 | Status: SHIPPED | OUTPATIENT
Start: 2025-07-21

## 2025-07-21 RX ORDER — DULOXETIN HYDROCHLORIDE 60 MG/1
60 CAPSULE, DELAYED RELEASE ORAL DAILY
Qty: 90 CAPSULE | Refills: 1 | Status: SHIPPED | OUTPATIENT
Start: 2025-07-21

## 2025-07-21 RX ORDER — COLESTIPOL HYDROCHLORIDE 1 G/1
1 TABLET ORAL 2 TIMES DAILY
Qty: 180 TABLET | Refills: 1 | Status: SHIPPED | OUTPATIENT
Start: 2025-07-21

## 2025-07-28 DIAGNOSIS — M81.0 AGE-RELATED OSTEOPOROSIS WITHOUT CURRENT PATHOLOGICAL FRACTURE: ICD-10-CM

## 2025-07-31 RX ORDER — ALENDRONATE SODIUM 70 MG/1
TABLET ORAL
Qty: 12 TABLET | Refills: 0 | Status: SHIPPED | OUTPATIENT
Start: 2025-07-31

## 2025-08-27 DIAGNOSIS — D50.9 IRON DEFICIENCY ANEMIA, UNSPECIFIED IRON DEFICIENCY ANEMIA TYPE: ICD-10-CM

## 2025-08-27 DIAGNOSIS — M81.0 AGE-RELATED OSTEOPOROSIS WITHOUT CURRENT PATHOLOGICAL FRACTURE: ICD-10-CM

## 2025-08-29 RX ORDER — FERROUS SULFATE 325(65) MG
TABLET ORAL
Qty: 100 TABLET | Refills: 0 | Status: SHIPPED | OUTPATIENT
Start: 2025-08-29

## 2025-08-29 RX ORDER — ALENDRONATE SODIUM 70 MG/1
TABLET ORAL
Qty: 12 TABLET | Refills: 0 | Status: SHIPPED | OUTPATIENT
Start: 2025-08-29